# Patient Record
Sex: MALE | Race: WHITE | NOT HISPANIC OR LATINO | URBAN - METROPOLITAN AREA
[De-identification: names, ages, dates, MRNs, and addresses within clinical notes are randomized per-mention and may not be internally consistent; named-entity substitution may affect disease eponyms.]

---

## 2022-04-11 ENCOUNTER — INPATIENT (INPATIENT)
Facility: HOSPITAL | Age: 62
LOS: 16 days | Discharge: HOME HEALTH | DRG: 64 | End: 2022-04-28
Attending: STUDENT IN AN ORGANIZED HEALTH CARE EDUCATION/TRAINING PROGRAM | Admitting: FAMILY MEDICINE
Payer: COMMERCIAL

## 2022-04-11 VITALS
DIASTOLIC BLOOD PRESSURE: 111 MMHG | HEART RATE: 100 BPM | SYSTOLIC BLOOD PRESSURE: 165 MMHG | TEMPERATURE: 99 F | WEIGHT: 149.91 LBS | RESPIRATION RATE: 18 BRPM | HEIGHT: 71 IN | OXYGEN SATURATION: 96 %

## 2022-04-11 DIAGNOSIS — I63.9 CEREBRAL INFARCTION, UNSPECIFIED: ICD-10-CM

## 2022-04-11 LAB
ADD ON TEST-SPECIMEN IN LAB: SIGNIFICANT CHANGE UP
ALBUMIN SERPL ELPH-MCNC: 3.6 G/DL — SIGNIFICANT CHANGE UP (ref 3.3–5)
ALP SERPL-CCNC: 104 U/L — SIGNIFICANT CHANGE UP (ref 40–120)
ALT FLD-CCNC: 25 U/L — SIGNIFICANT CHANGE UP (ref 12–78)
ANION GAP SERPL CALC-SCNC: 6 MMOL/L — SIGNIFICANT CHANGE UP (ref 5–17)
APAP SERPL-MCNC: < 2 UG/ML (ref 10–30)
APPEARANCE UR: ABNORMAL
APPEARANCE UR: CLEAR — SIGNIFICANT CHANGE UP
APTT BLD: 26.5 SEC — LOW (ref 27.5–35.5)
AST SERPL-CCNC: 27 U/L — SIGNIFICANT CHANGE UP (ref 15–37)
BASOPHILS # BLD AUTO: 0.06 K/UL — SIGNIFICANT CHANGE UP (ref 0–0.2)
BASOPHILS NFR BLD AUTO: 0.5 % — SIGNIFICANT CHANGE UP (ref 0–2)
BILIRUB SERPL-MCNC: 1.5 MG/DL — HIGH (ref 0.2–1.2)
BILIRUB UR-MCNC: NEGATIVE — SIGNIFICANT CHANGE UP
BILIRUB UR-MCNC: NEGATIVE — SIGNIFICANT CHANGE UP
BUN SERPL-MCNC: 20 MG/DL — SIGNIFICANT CHANGE UP (ref 7–23)
CALCIUM SERPL-MCNC: 8.7 MG/DL — SIGNIFICANT CHANGE UP (ref 8.5–10.1)
CHLORIDE SERPL-SCNC: 113 MMOL/L — HIGH (ref 96–108)
CO2 SERPL-SCNC: 24 MMOL/L — SIGNIFICANT CHANGE UP (ref 22–31)
COLOR SPEC: YELLOW — SIGNIFICANT CHANGE UP
COLOR SPEC: YELLOW — SIGNIFICANT CHANGE UP
CREAT SERPL-MCNC: 1.21 MG/DL — SIGNIFICANT CHANGE UP (ref 0.5–1.3)
DIFF PNL FLD: ABNORMAL
DIFF PNL FLD: NEGATIVE — SIGNIFICANT CHANGE UP
EGFR: 67 ML/MIN/1.73M2 — SIGNIFICANT CHANGE UP
EOSINOPHIL # BLD AUTO: 0.08 K/UL — SIGNIFICANT CHANGE UP (ref 0–0.5)
EOSINOPHIL NFR BLD AUTO: 0.6 % — SIGNIFICANT CHANGE UP (ref 0–6)
ETHANOL SERPL-MCNC: <10 MG/DL — SIGNIFICANT CHANGE UP (ref 0–10)
GLUCOSE SERPL-MCNC: 84 MG/DL — SIGNIFICANT CHANGE UP (ref 70–99)
GLUCOSE UR QL: NEGATIVE — SIGNIFICANT CHANGE UP
GLUCOSE UR QL: NEGATIVE — SIGNIFICANT CHANGE UP
HCT VFR BLD CALC: 43.2 % — SIGNIFICANT CHANGE UP (ref 39–50)
HGB BLD-MCNC: 14.3 G/DL — SIGNIFICANT CHANGE UP (ref 13–17)
IMM GRANULOCYTES NFR BLD AUTO: 0.9 % — SIGNIFICANT CHANGE UP (ref 0–1.5)
INR BLD: 1.18 RATIO — HIGH (ref 0.88–1.16)
KETONES UR-MCNC: NEGATIVE — SIGNIFICANT CHANGE UP
KETONES UR-MCNC: NEGATIVE — SIGNIFICANT CHANGE UP
LEUKOCYTE ESTERASE UR-ACNC: NEGATIVE — SIGNIFICANT CHANGE UP
LEUKOCYTE ESTERASE UR-ACNC: NEGATIVE — SIGNIFICANT CHANGE UP
LYMPHOCYTES # BLD AUTO: 1.2 K/UL — SIGNIFICANT CHANGE UP (ref 1–3.3)
LYMPHOCYTES # BLD AUTO: 9.3 % — LOW (ref 13–44)
MAGNESIUM SERPL-MCNC: 2.4 MG/DL — SIGNIFICANT CHANGE UP (ref 1.6–2.6)
MCHC RBC-ENTMCNC: 30.3 PG — SIGNIFICANT CHANGE UP (ref 27–34)
MCHC RBC-ENTMCNC: 33.1 GM/DL — SIGNIFICANT CHANGE UP (ref 32–36)
MCV RBC AUTO: 91.5 FL — SIGNIFICANT CHANGE UP (ref 80–100)
MONOCYTES # BLD AUTO: 1.15 K/UL — HIGH (ref 0–0.9)
MONOCYTES NFR BLD AUTO: 9 % — SIGNIFICANT CHANGE UP (ref 2–14)
NEUTROPHILS # BLD AUTO: 10.23 K/UL — HIGH (ref 1.8–7.4)
NEUTROPHILS NFR BLD AUTO: 79.7 % — HIGH (ref 43–77)
NITRITE UR-MCNC: NEGATIVE — SIGNIFICANT CHANGE UP
NITRITE UR-MCNC: NEGATIVE — SIGNIFICANT CHANGE UP
PCP SPEC-MCNC: SIGNIFICANT CHANGE UP
PH UR: 5 — SIGNIFICANT CHANGE UP (ref 5–8)
PH UR: 5 — SIGNIFICANT CHANGE UP (ref 5–8)
PLATELET # BLD AUTO: 150 K/UL — SIGNIFICANT CHANGE UP (ref 150–400)
POTASSIUM SERPL-MCNC: 3.5 MMOL/L — SIGNIFICANT CHANGE UP (ref 3.5–5.3)
POTASSIUM SERPL-SCNC: 3.5 MMOL/L — SIGNIFICANT CHANGE UP (ref 3.5–5.3)
PROT SERPL-MCNC: 6.6 GM/DL — SIGNIFICANT CHANGE UP (ref 6–8.3)
PROT UR-MCNC: NEGATIVE — SIGNIFICANT CHANGE UP
PROT UR-MCNC: NEGATIVE — SIGNIFICANT CHANGE UP
PROTHROM AB SERPL-ACNC: 13.7 SEC — HIGH (ref 10.5–13.4)
RBC # BLD: 4.72 M/UL — SIGNIFICANT CHANGE UP (ref 4.2–5.8)
RBC # FLD: 13.4 % — SIGNIFICANT CHANGE UP (ref 10.3–14.5)
SALICYLATES SERPL-MCNC: <1.7 MG/DL (ref 2.8–20)
SARS-COV-2 RNA SPEC QL NAA+PROBE: SIGNIFICANT CHANGE UP
SODIUM SERPL-SCNC: 143 MMOL/L — SIGNIFICANT CHANGE UP (ref 135–145)
SP GR SPEC: 1 — LOW (ref 1.01–1.02)
SP GR SPEC: 1 — LOW (ref 1.01–1.02)
TROPONIN I, HIGH SENSITIVITY RESULT: 61.67 NG/L — SIGNIFICANT CHANGE UP
TROPONIN I, HIGH SENSITIVITY RESULT: 76.27 NG/L — SIGNIFICANT CHANGE UP
TROPONIN I, HIGH SENSITIVITY RESULT: 96.59 NG/L — HIGH
UROBILINOGEN FLD QL: NEGATIVE — SIGNIFICANT CHANGE UP
UROBILINOGEN FLD QL: NEGATIVE — SIGNIFICANT CHANGE UP
WBC # BLD: 12.84 K/UL — HIGH (ref 3.8–10.5)
WBC # FLD AUTO: 12.84 K/UL — HIGH (ref 3.8–10.5)

## 2022-04-11 PROCEDURE — 81001 URINALYSIS AUTO W/SCOPE: CPT

## 2022-04-11 PROCEDURE — 97110 THERAPEUTIC EXERCISES: CPT | Mod: GP

## 2022-04-11 PROCEDURE — 80162 ASSAY OF DIGOXIN TOTAL: CPT

## 2022-04-11 PROCEDURE — 71045 X-RAY EXAM CHEST 1 VIEW: CPT | Mod: 26

## 2022-04-11 PROCEDURE — 93005 ELECTROCARDIOGRAM TRACING: CPT

## 2022-04-11 PROCEDURE — 80048 BASIC METABOLIC PNL TOTAL CA: CPT

## 2022-04-11 PROCEDURE — 97162 PT EVAL MOD COMPLEX 30 MIN: CPT | Mod: GP

## 2022-04-11 PROCEDURE — C1769: CPT

## 2022-04-11 PROCEDURE — 80061 LIPID PANEL: CPT

## 2022-04-11 PROCEDURE — 86803 HEPATITIS C AB TEST: CPT

## 2022-04-11 PROCEDURE — 87040 BLOOD CULTURE FOR BACTERIA: CPT

## 2022-04-11 PROCEDURE — 80053 COMPREHEN METABOLIC PANEL: CPT

## 2022-04-11 PROCEDURE — 97116 GAIT TRAINING THERAPY: CPT | Mod: GP

## 2022-04-11 PROCEDURE — 93306 TTE W/DOPPLER COMPLETE: CPT

## 2022-04-11 PROCEDURE — 84100 ASSAY OF PHOSPHORUS: CPT

## 2022-04-11 PROCEDURE — 70498 CT ANGIOGRAPHY NECK: CPT | Mod: 26,MA

## 2022-04-11 PROCEDURE — 85610 PROTHROMBIN TIME: CPT

## 2022-04-11 PROCEDURE — 92523 SPEECH SOUND LANG COMPREHEN: CPT | Mod: GN

## 2022-04-11 PROCEDURE — 71250 CT THORAX DX C-: CPT

## 2022-04-11 PROCEDURE — 99223 1ST HOSP IP/OBS HIGH 75: CPT

## 2022-04-11 PROCEDURE — 84439 ASSAY OF FREE THYROXINE: CPT

## 2022-04-11 PROCEDURE — 82962 GLUCOSE BLOOD TEST: CPT

## 2022-04-11 PROCEDURE — U0005: CPT

## 2022-04-11 PROCEDURE — 97530 THERAPEUTIC ACTIVITIES: CPT | Mod: GP

## 2022-04-11 PROCEDURE — U0003: CPT

## 2022-04-11 PROCEDURE — 0042T: CPT

## 2022-04-11 PROCEDURE — 70496 CT ANGIOGRAPHY HEAD: CPT | Mod: 26,MA

## 2022-04-11 PROCEDURE — 85027 COMPLETE CBC AUTOMATED: CPT

## 2022-04-11 PROCEDURE — 83880 ASSAY OF NATRIURETIC PEPTIDE: CPT

## 2022-04-11 PROCEDURE — 84484 ASSAY OF TROPONIN QUANT: CPT

## 2022-04-11 PROCEDURE — 36415 COLL VENOUS BLD VENIPUNCTURE: CPT

## 2022-04-11 PROCEDURE — 92610 EVALUATE SWALLOWING FUNCTION: CPT | Mod: GN

## 2022-04-11 PROCEDURE — 76700 US EXAM ABDOM COMPLETE: CPT

## 2022-04-11 PROCEDURE — C1887: CPT

## 2022-04-11 PROCEDURE — 99285 EMERGENCY DEPT VISIT HI MDM: CPT

## 2022-04-11 PROCEDURE — 87635 SARS-COV-2 COVID-19 AMP PRB: CPT

## 2022-04-11 PROCEDURE — 93010 ELECTROCARDIOGRAM REPORT: CPT

## 2022-04-11 PROCEDURE — 70551 MRI BRAIN STEM W/O DYE: CPT

## 2022-04-11 PROCEDURE — C1894: CPT

## 2022-04-11 PROCEDURE — 84443 ASSAY THYROID STIM HORMONE: CPT

## 2022-04-11 PROCEDURE — 70450 CT HEAD/BRAIN W/O DYE: CPT

## 2022-04-11 PROCEDURE — 83735 ASSAY OF MAGNESIUM: CPT

## 2022-04-11 PROCEDURE — 83036 HEMOGLOBIN GLYCOSYLATED A1C: CPT

## 2022-04-11 PROCEDURE — 81003 URINALYSIS AUTO W/O SCOPE: CPT

## 2022-04-11 PROCEDURE — 92526 ORAL FUNCTION THERAPY: CPT | Mod: GN

## 2022-04-11 PROCEDURE — 93454 CORONARY ARTERY ANGIO S&I: CPT

## 2022-04-11 PROCEDURE — 71045 X-RAY EXAM CHEST 1 VIEW: CPT

## 2022-04-11 RX ORDER — METOPROLOL TARTRATE 50 MG
25 TABLET ORAL
Refills: 0 | Status: DISCONTINUED | OUTPATIENT
Start: 2022-04-11 | End: 2022-04-13

## 2022-04-11 RX ORDER — ASPIRIN/CALCIUM CARB/MAGNESIUM 324 MG
162 TABLET ORAL ONCE
Refills: 0 | Status: COMPLETED | OUTPATIENT
Start: 2022-04-11 | End: 2022-04-11

## 2022-04-11 RX ORDER — DILTIAZEM HCL 120 MG
5 CAPSULE, EXT RELEASE 24 HR ORAL
Qty: 125 | Refills: 0 | Status: DISCONTINUED | OUTPATIENT
Start: 2022-04-11 | End: 2022-04-13

## 2022-04-11 RX ORDER — SODIUM CHLORIDE 9 MG/ML
1000 INJECTION INTRAMUSCULAR; INTRAVENOUS; SUBCUTANEOUS
Refills: 0 | Status: DISCONTINUED | OUTPATIENT
Start: 2022-04-11 | End: 2022-04-11

## 2022-04-11 RX ORDER — METOPROLOL TARTRATE 50 MG
5 TABLET ORAL ONCE
Refills: 0 | Status: COMPLETED | OUTPATIENT
Start: 2022-04-11 | End: 2022-04-11

## 2022-04-11 RX ORDER — SODIUM CHLORIDE 9 MG/ML
1000 INJECTION INTRAMUSCULAR; INTRAVENOUS; SUBCUTANEOUS ONCE
Refills: 0 | Status: COMPLETED | OUTPATIENT
Start: 2022-04-11 | End: 2022-04-11

## 2022-04-11 RX ORDER — ENOXAPARIN SODIUM 100 MG/ML
80 INJECTION SUBCUTANEOUS ONCE
Refills: 0 | Status: DISCONTINUED | OUTPATIENT
Start: 2022-04-11 | End: 2022-04-11

## 2022-04-11 RX ORDER — ENOXAPARIN SODIUM 100 MG/ML
68 INJECTION SUBCUTANEOUS ONCE
Refills: 0 | Status: DISCONTINUED | OUTPATIENT
Start: 2022-04-11 | End: 2022-04-11

## 2022-04-11 RX ORDER — ASPIRIN/CALCIUM CARB/MAGNESIUM 324 MG
81 TABLET ORAL DAILY
Refills: 0 | Status: DISCONTINUED | OUTPATIENT
Start: 2022-04-11 | End: 2022-04-12

## 2022-04-11 RX ORDER — FUROSEMIDE 40 MG
20 TABLET ORAL DAILY
Refills: 0 | Status: DISCONTINUED | OUTPATIENT
Start: 2022-04-11 | End: 2022-04-12

## 2022-04-11 RX ORDER — ATORVASTATIN CALCIUM 80 MG/1
80 TABLET, FILM COATED ORAL AT BEDTIME
Refills: 0 | Status: DISCONTINUED | OUTPATIENT
Start: 2022-04-11 | End: 2022-04-28

## 2022-04-11 RX ORDER — HEPARIN SODIUM 5000 [USP'U]/ML
5000 INJECTION INTRAVENOUS; SUBCUTANEOUS EVERY 12 HOURS
Refills: 0 | Status: DISCONTINUED | OUTPATIENT
Start: 2022-04-11 | End: 2022-04-12

## 2022-04-11 RX ADMIN — Medication 25 MILLIGRAM(S): at 22:52

## 2022-04-11 RX ADMIN — Medication 5 MG/HR: at 14:36

## 2022-04-11 RX ADMIN — Medication 162 MILLIGRAM(S): at 11:53

## 2022-04-11 RX ADMIN — SODIUM CHLORIDE 1000 MILLILITER(S): 9 INJECTION INTRAMUSCULAR; INTRAVENOUS; SUBCUTANEOUS at 07:58

## 2022-04-11 RX ADMIN — Medication 5 MILLIGRAM(S): at 07:58

## 2022-04-11 RX ADMIN — ATORVASTATIN CALCIUM 80 MILLIGRAM(S): 80 TABLET, FILM COATED ORAL at 22:52

## 2022-04-11 RX ADMIN — SODIUM CHLORIDE 50 MILLILITER(S): 9 INJECTION INTRAMUSCULAR; INTRAVENOUS; SUBCUTANEOUS at 14:36

## 2022-04-11 RX ADMIN — HEPARIN SODIUM 5000 UNIT(S): 5000 INJECTION INTRAVENOUS; SUBCUTANEOUS at 23:00

## 2022-04-11 NOTE — ED ADULT NURSE REASSESSMENT NOTE - NS ED NURSE REASSESS COMMENT FT1
received patient at approximately 1130, alert and awake, foregetful, sister at bedside, denies pain, left sided weakness and imbalance, pt attempted to climb out of bed several times without assistance, patient placed closer to nursing station for safety, blood noted in urine, dr. leone made aware and dr. lepe made aware, voiding without difficulty in urine after bladder scan of 200ml noted, patient voided 200 ml, cardizem drip initiated and infusing for afib at 125 Heart rate on monitor, heart rate currently 88.

## 2022-04-11 NOTE — ED PROVIDER NOTE - CLINICAL SUMMARY MEDICAL DECISION MAKING FREE TEXT BOX
Pt presents s/p fall at home when he woke up from the couch c/o right leg weakness now resolved.  Pt AAO x 3 and initial NIHSS zero at triage.  Will get CT head and cardiac workup. Pt presents s/p fall at home when he woke up from the couch c/o right leg weakness now resolved.  Pt AAO x 3 and initial NIHSS zero at triage.  Will get CT head and cardiac workup for suspected TIA.  Pt signed out to Dr. Alegre at 0700 for repeat evaluation and admission.

## 2022-04-11 NOTE — ED ADULT NURSE REASSESSMENT NOTE - NS ED NURSE REASSESS COMMENT FT1
as per dr. sherley bazzi to give aspirin at this time and hold off on lovenox. Will medicate as per order. as per dr. sherley bazzi to give aspirin at this time and hold off on lovenox. Will medicate as per order.  also aware of BP and HR.

## 2022-04-11 NOTE — H&P ADULT - NSHPREVIEWOFSYSTEMS_GEN_ALL_CORE
REVIEW OF SYSTEMS:    CONSTITUTIONAL: + left sided weakness No fevers or chills  EYES/ENT: No visual changes; vertigo or throat pain   NECK: No pain or stiffness  RESPIRATORY: No cough, wheezing, hemoptysis or shortness of breath  CARDIOVASCULAR: No chest pain or palpitations  GASTROINTESTINAL: No abdominal or epigastric pain. No nausea, vomiting, or hematemesis; No diarrhea or constipation. No melena or hematochezia.  GENITOURINARY: No dysuria, urinary frequency or hematuria  NEUROLOGICAL: No numbness or weakness  EXTREMITIES: No swelling or tenderness  SKIN: No itching, burning, rashes, or lesions   All other review of systems is negative unless indicated above.

## 2022-04-11 NOTE — SWALLOW BEDSIDE ASSESSMENT ADULT - SWALLOW EVAL: CRITERIA FOR SKILLED INTERVENTION MET
DO NOT FEEL THAT ACUTE SPEECH PATHOLOGY  FOLLOW UP WOULD CHANGE CLINICAL MANAGEMENT/OUTCOME WHILE PT IN ACUTE HOSPITAL SETTING. PT'S SPEECH-LANGUAGE ABILITIES AND OROPHARYNGEAL SWALLOWING ABILITIES ARE FELT TO BE FUNCTIONAL/AT USUAL STATE. GIVEN ABOVE, THIS SERVICE WILL NOT ACTIVELY FOLLOW. RECONSULT PRN SHOULD STATUS CHANGE AND CONDITION WARRANT.

## 2022-04-11 NOTE — SWALLOW BEDSIDE ASSESSMENT ADULT - SLP GENERAL OBSERVATIONS
On encounter, left sided weakness was evident. Pt was alert. His affect was flat. Pt was interactive. Pt's receptive language abilities were functional and he was able to verbalize during communicative probes. At these times, a frontal lisp was noted which he has had since childhood. His motor speech abilities are functional, his speech output was intelligible and his verbalizations were linguistically intact/contextually appropriate. Pt able to verbalize needs. He feels he is at communicative baseline.

## 2022-04-11 NOTE — CONSULT NOTE ADULT - SUBJECTIVE AND OBJECTIVE BOX
Patient is a 62y old Male who presents with a chief complaint of left sided weakness    HPI: 62 yr old M with no PMHX was brought to ED by EMS on 4/11/22 s/p fall for LE weakness. Patient states at 5:30 am this morning he got up to turn off the TV when his leg gave way and he fell. Pt's sister lives downstairs and heard the fall; she went upstairs and helped him up to a chair, but then pt got up and fell again. Pt's sister was concerned he was having a stroke so she called EMS.     In ED the patient was evaluated, and code stroke was called. CTH showed no acute infarct or hemorrhage. CTA head and neck showed no LVO or stenosis or aneurysms. CT perfusion showed no core infarct. IV TPA was not given because LKN was last night, NIHSS 3. Pt was given 2 baby ASA. He was found to have new onset Afib in ED, and BP was elevated at 156/118.    Upon evaluation, pt is awake and alert, is aware that he fell earlier this morning. He denies any numbness, tingling, headache, visual changes, changes in speech, dizziness or vertigo. Pt's sister states that he has not seen a medical doctor in years, does not take any medications.     PAST MEDICAL & SURGICAL HISTORY:      FAMILY HISTORY:      Social Hx:  Nonsmoker, no drug or alcohol use    MEDICATIONS  (STANDING):  aspirin  chewable 162 milliGRAM(s) Oral once  enoxaparin Injectable 68 milliGRAM(s) SubCutaneous once       Allergies    No Known Allergies    Intolerances        ROS: Pertinent positives in HPI, all other ROS were reviewed and are negative.      Vital Signs Last 24 Hrs  T(C): 37.7 (11 Apr 2022 10:28), Max: 37.7 (11 Apr 2022 10:28)  T(F): 99.8 (11 Apr 2022 10:28), Max: 99.8 (11 Apr 2022 10:28)  HR: 107 (11 Apr 2022 10:00) (100 - 122)  BP: 145/108 (11 Apr 2022 10:00) (139/109 - 165/111)  BP(mean): 124 (11 Apr 2022 09:30) (117 - 126)  RR: 20 (11 Apr 2022 10:00) (16 - 24)  SpO2: 95% (11 Apr 2022 10:00) (91% - 96%)    Physical Exam:    General: Normocephalic, NAD/Obtunded-sedated      Constitutional: awake and alert.  HEENT: PERRLA, EOMI,   Neck: Supple.  Respiratory: Breath sounds are clear bilaterally  Cardiovascular: S1 and S2, regular / irregular rhythm  Gastrointestinal: soft, nontender  Extremities:  no edema  Vascular: Caritid Bruit - no  Musculoskeletal: no joint swelling/tenderness, no abnormal movements  Skin: No rashes    Neurological exam:  HF: A x O x 3. Appropriately interactive, normal affect. Speech fluent, No Aphasia or paraphasic errors. Naming /repetition intact   CN: VIVEK, EOMI, VFF, facial sensation normal, no NLFD, tongue midline, Palate moves equally, SCM equal bilaterally  Motor: No pronator drift, Strength 5/5 in all 4 ext, normal bulk and tone, no tremor, rigidity or bradykinesia.    Sens: Intact to light touch / PP/ VS/ JS    Reflexes: Symmetric and normal . BJ 2+, BR 2+, KJ 2+, AJ 2+, downgoing toes b/l  Coord:  No FNFA, dysmetria, SHEELA intact   Gait/Balance: Normal/Cannot test    NIHSS:          NIHSS:    Labs:   04-11    143  |  113<H>  |  20  ----------------------------<  84  3.5   |  24  |  1.21    Ca    8.7      11 Apr 2022 07:22  Mg     2.4     04-11    TPro  6.6  /  Alb  3.6  /  TBili  1.5<H>  /  DBili  x   /  AST  27  /  ALT  25  /  AlkPhos  104  04-11                              14.3   12.84 )-----------( 150      ( 11 Apr 2022 07:22 )             43.2       Radiology:  - CT Head:  - MRI brain  -MRA brain/Carotids  - EEG  -Echo (just write one or two important things from impression)    A/P: Copy and paste this section to assessment and plan section    For an acute stroke found incidentally, need to do full stroke workup, treat like acute stroke (carotid dopplers are ok instead of MRA head and neck in old age group)  Write most important diagnoses first  Do not need to repeat exam findings  Don't write consider (unless it's cardiac)- write recommend  Don't write why you're doing something (no thought processs) unless it's a differential. Make it simple  Don't say 'appreciate recs' we're only consulting    No IV tpa given because…    #    -ASA/PLAVIX or No ASA plavix for 24 hrs (if TPA was given)  -Continue Atorvastatin  -DVT prophylaxis  -Dysphagia screen  -Echo  -Speech and swallow eval  -PT eval/ rehab eval  -Telemonitoring    - MRI head ordered  - ASA 81 mg QD, starting 24 hrs after  was given, or ASA suppository if patient failed dysphagia screen  - Atorvastatin, lipid profile, Hgb A1c within 24 hours  - Monitor HTN- maintain systolic bp 170-190, no less than 120  - MRI head  - Neuro checks Q4h  - Vital signs Q4h  - Blood glucose checks Q6h for 1st 24hrs  - Echo  - PT eval  - Dysphagia screen today  - DVT prophylaxis  - Speech/swallow eval if patient fails dysphagia screen  - Telemonitoring             Patient is a 62y old Male who presents with a chief complaint of left sided weakness    HPI: 62 yr old M with no PMHX was brought to ED by EMS on 4/11/22 s/p fall for LE weakness. Patient states at 5:30 am this morning he got up to turn off the TV when his leg gave way and he fell. Pt's sister lives downstairs and heard the fall; she went upstairs and helped him up to a chair, but then pt got up and fell again. Pt's sister was concerned he was having a stroke so she called EMS.     In ED the patient was evaluated, and code stroke was called. CTH showed no acute infarct or hemorrhage. CTA head and neck showed no LVO or stenosis or aneurysms. CT perfusion showed no core infarct. IV TPA was not given because LKN was last night, NIHSS 3. Pt was given 2 baby ASA. He was found to have new onset Afib in ED, and upon my evaluation BP was elevated at 156/118.    At bedside pt is awake and alert, is aware that he fell earlier this morning. He denies any numbness, tingling, headache, visual changes, changes in speech, dizziness or vertigo. Pt's sister states that he has not seen a medical doctor in years, does not take any medications.     PAST MEDICAL & SURGICAL HISTORY:  None    FAMILY HISTORY: Noncontributory    Social Hx:  Nonsmoker, no drug or alcohol use    MEDICATIONS  (STANDING):  aspirin  chewable 162 milliGRAM(s) Oral once  enoxaparin Injectable 68 milliGRAM(s) SubCutaneous once     Allergies: No Known Allergies    ROS: Pertinent positives in HPI, all other ROS were reviewed and are negative.      Vital Signs Last 24 Hrs  T(C): 37.7 (11 Apr 2022 10:28), Max: 37.7 (11 Apr 2022 10:28)  T(F): 99.8 (11 Apr 2022 10:28), Max: 99.8 (11 Apr 2022 10:28)  HR: 107 (11 Apr 2022 10:00) (100 - 122)  BP: 145/108 (11 Apr 2022 10:00) (139/109 - 165/111)  BP(mean): 124 (11 Apr 2022 09:30) (117 - 126)  RR: 20 (11 Apr 2022 10:00) (16 - 24)  SpO2: 95% (11 Apr 2022 10:00) (91% - 96%)    Physical Exam:    General: Normocephalic, NAD  HEENT: PERRLA, EOMI,   Neck: Supple.  Respiratory: Breath sounds are clear bilaterally  Cardiovascular: S1 and S2  Extremities:  no edema  Vascular: Carotid Bruit - no  Musculoskeletal: no joint swelling/tenderness, no abnormal movements  Skin: No rashes    Neurological exam:  HF: A x O x 3. Appropriately interactive, normal affect but hesitant. No Aphasia, ?mild dysarthria  CN: VIVEK, EOMI, VFF, facial sensation normal, no NLFD, tongue midline  Motor: +L pronator drift but doesn't hit bed, LLE drift w/o hitting bed, Strength 5/5 RUE and RLE, normal bulk and tone, no tremor, rigidity or bradykinesia.    Sens: Intact to light touch throughout  Reflexes: Symmetric and normal, BJ 2+, BR 2+, KJ 2+, AJ 2+, downgoing toes b/l  Coord:  No FNFA, +LLE dysmetria, SHEELA intact   Gait/Balance: Cannot test    NIHSS: 4      RESULT SUMMARY:  4 points  NIH Stroke Scale    INPUTS:  1A: Level of consciousness —> 0 = Alert; keenly responsive  1B: Ask month and age —> 0 = Both questions right  1C: 'Blink eyes' & 'squeeze hands' —> 0 = Performs both tasks  2: Horizontal extraocular movements —> 0 = Normal  3: Visual fields —> 0 = No visual loss  4: Facial palsy —> 0 = Normal symmetry  5A: Left arm motor drift —> 1 = Drift, but doesn't hit bed  5B: Right arm motor drift —> 0 = No drift for 10 seconds  6A: Left leg motor drift —> 1 = Drift, but doesn't hit bed  6B: Right leg motor drift —> 0 = No drift for 5 seconds  7: Limb Ataxia —> 1 = Ataxia in 1 Limb  8: Sensation —> 0 = Normal; no sensory loss  9: Language/aphasia —> 0 = Normal; no aphasia  10: Dysarthria —> 1 = Mild-moderate dysarthria: slurring but can be understood  11: Extinction/inattention —> 0 = No abnormality    RESULT SUMMARY:  0 points  Modified [place] Scale    INPUTS:  Patient's Baseline Activity —> 0 = No symptoms at all    Labs:   04-11    143  |  113<H>  |  20  ----------------------------<  84  3.5   |  24  |  1.21    Ca    8.7      11 Apr 2022 07:22  Mg     2.4     04-11    TPro  6.6  /  Alb  3.6  /  TBili  1.5<H>  /  DBili  x   /  AST  27  /  ALT  25  /  AlkPhos  104  04-11                          14.3   12.84 )-----------( 150      ( 11 Apr 2022 07:22 )             43.2       Radiology:  CT Angio Neck w/ IV Cont (04.11.22 @ 07:42) >    IMPRESSION:  CT brain perfusion: Apparent increased mean transit time in the bilateral   temporal lobes, more on the right, and right frontal lobe, as well as in   the bilateral cerebellum, may be artifactual. Consider further evaluation   with MRI.    CTA head and neck:    -Moderate atherosclerotic calcifications along the bilateral carotid   bifurcations without significant stenosis. The internal carotid arteries   are patent without significant stenosis. Dense atherosclerotic   calcifications along the bilateral cavernous segments without significant   stenosis.  -Patent cervical and intracranial major arterial vasculature without   evidence of abrupt vessel cut off, hemodynamically significantstenosis,   aneurysm, or dissection.  -Bilateral pleural effusions.    CT Brain Stroke Protocol (04.11.22 @ 07:31) >    IMPRESSION:  No acute intracranial hemorrhage or mass effect.           Patient is a 62y old Male who presents with a chief complaint of left sided weakness    HPI: 62 yr old M with no PMHX was brought to ED by EMS on 4/11/22 s/p fall for LE weakness. Patient states at 5:30 am this morning he got up to turn off the TV when his leg gave way and he fell. Pt's sister lives downstairs and heard the fall; she went upstairs and helped him up to a chair, but then pt got up and fell again. Pt's sister was concerned he was having a stroke so she called EMS.     In ED the patient was evaluated, and code stroke was called. CTH showed no acute infarct or hemorrhage. CTA head and neck showed no LVO or stenosis or aneurysms. CT perfusion showed no core infarct. IV TPA was not given because LKN was last night, NIHSS 3. Pt was given 2 baby ASA. He was found to have new onset Afib in ED, and upon my evaluation BP was elevated at 156/118.    At bedside pt is awake and alert, is aware that he fell earlier this morning. He denies any numbness, tingling, headache, visual changes, changes in speech, dizziness or vertigo. Pt's sister states that he has not seen a medical doctor in years, does not take any medications.     PAST MEDICAL & SURGICAL HISTORY:  None    FAMILY HISTORY: Noncontributory    Social Hx:  Nonsmoker, no drug or alcohol use    MEDICATIONS  (STANDING):  aspirin  chewable 162 milliGRAM(s) Oral once  enoxaparin Injectable 68 milliGRAM(s) SubCutaneous once     Allergies: No Known Allergies    ROS: Pertinent positives in HPI, all other ROS were reviewed and are negative.      Vital Signs Last 24 Hrs  T(C): 37.7 (11 Apr 2022 10:28), Max: 37.7 (11 Apr 2022 10:28)  T(F): 99.8 (11 Apr 2022 10:28), Max: 99.8 (11 Apr 2022 10:28)  HR: 107 (11 Apr 2022 10:00) (100 - 122)  BP: 145/108 (11 Apr 2022 10:00) (139/109 - 165/111)  BP(mean): 124 (11 Apr 2022 09:30) (117 - 126)  RR: 20 (11 Apr 2022 10:00) (16 - 24)  SpO2: 95% (11 Apr 2022 10:00) (91% - 96%)    Physical Exam:    General: Normocephalic, NAD  HEENT: PERRLA, EOMI,   Neck: Supple.  Respiratory: Breath sounds are clear bilaterally  Cardiovascular: S1 and S2  Extremities:  no edema  Vascular: Carotid Bruit - no  Musculoskeletal: no joint swelling/tenderness, no abnormal movements  Skin: No rashes    Neurological exam:  HF: A x O x 3. Appropriately interactive, normal affect but hesitant. No Aphasia, ?mild dysarthria  CN: VIVEK, EOMI, VFF, facial sensation normal, flattened nasolabial fold on left, , tongue midline  Motor: +L pronator drift but doesn't hit bed, LLE drift w/o hitting bed, Strength 5/5 RUE and RLE, normal bulk and tone, no tremor, rigidity or bradykinesia.    Sens: Intact to light touch throughout  Reflexes: Symmetric and normal, BJ 2+, BR 2+, KJ 2+, AJ 2+, downgoing toes b/l  Coord:  No FNFA, +LLE dysmetria, SHEELA intact   Gait/Balance: Cannot test    NIHSS: 5      RESULT SUMMARY:  5 points  NIH Stroke Scale    INPUTS:  1A: Level of consciousness —> 0 = Alert; keenly responsive  1B: Ask month and age —> 0 = Both questions right  1C: 'Blink eyes' & 'squeeze hands' —> 0 = Performs both tasks  2: Horizontal extraocular movements —> 0 = Normal  3: Visual fields —> 0 = No visual loss  4: Facial palsy —> 1 = flattened nasolabial fold on left  5A: Left arm motor drift —> 1 = Drift, but doesn't hit bed  5B: Right arm motor drift —> 0 = No drift for 10 seconds  6A: Left leg motor drift —> 1 = Drift, but doesn't hit bed  6B: Right leg motor drift —> 0 = No drift for 5 seconds  7: Limb Ataxia —> 1 = Ataxia in 1 Limb  8: Sensation —> 0 = Normal; no sensory loss  9: Language/aphasia —> 0 = Normal; no aphasia  10: Dysarthria —> 1 = Mild-moderate dysarthria: slurring but can be understood  11: Extinction/inattention —> 0 = No abnormality    RESULT SUMMARY:  0 points  Modified [place] Scale    INPUTS:  Patient's Baseline Activity —> 0 = No symptoms at all    Labs:   04-11    143  |  113<H>  |  20  ----------------------------<  84  3.5   |  24  |  1.21    Ca    8.7      11 Apr 2022 07:22  Mg     2.4     04-11    TPro  6.6  /  Alb  3.6  /  TBili  1.5<H>  /  DBili  x   /  AST  27  /  ALT  25  /  AlkPhos  104  04-11                          14.3   12.84 )-----------( 150      ( 11 Apr 2022 07:22 )             43.2       Radiology:  CT Angio Neck w/ IV Cont (04.11.22 @ 07:42) >    IMPRESSION:  CT brain perfusion: Apparent increased mean transit time in the bilateral   temporal lobes, more on the right, and right frontal lobe, as well as in   the bilateral cerebellum, may be artifactual. Consider further evaluation   with MRI.    CTA head and neck:    -Moderate atherosclerotic calcifications along the bilateral carotid   bifurcations without significant stenosis. The internal carotid arteries   are patent without significant stenosis. Dense atherosclerotic   calcifications along the bilateral cavernous segments without significant   stenosis.  -Patent cervical and intracranial major arterial vasculature without   evidence of abrupt vessel cut off, hemodynamically significantstenosis,   aneurysm, or dissection.  -Bilateral pleural effusions.    CT Brain Stroke Protocol (04.11.22 @ 07:31) >    IMPRESSION:  No acute intracranial hemorrhage or mass effect.

## 2022-04-11 NOTE — ED PROVIDER NOTE - PROGRESS NOTE DETAILS
Pt reassessed, left pronator drift mild dysarthria, new onset afib, last known well last night, not iv tpa candidate, code stroke initiated.

## 2022-04-11 NOTE — CONSULT NOTE ADULT - SUBJECTIVE AND OBJECTIVE BOX
Cardiology Consultation    HPI: 62 yr old M with no PMHX was brought to ED by EMS on 22 s/p fall for LE weakness. Patient states at 5:30 am this morning he got up to turn off the TV when his leg gave way and he fell. Pt's sister lives downstairs and heard the fall; she went upstairs and helped him up to a chair, but then pt got up and fell again. Pt's sister was concerned he was having a stroke so she called EMS.  In ED the patient was evaluated, and code stroke was called. CTH showed no acute infarct or hemorrhage. CTA head and neck showed no LVO or stenosis or aneurysms. CT perfusion showed no core infarct. IV TPA was not given because LKN was last night, NIHSS 3. Pt was given 2 baby ASA. He was found to have new onset Afib in ED, and upon my evaluation BP was elevated at 156/118.  At bedside pt is awake and alert, is aware that he fell earlier this morning. He denies any Chest pain , numbness, tingling, headache, visual changes, dizziness or vertigo. Patient does have a hx of speech impediment but noticed he had some difficulty speaking this morning; Denies any dysphagia;  Pt's sister states that he has not seen a medical doctor in years, does not take any medications; Patient sister Nancy was at bedsided who he would like to make decisions for him [phone]    PAST MEDICAL & SURGICAL HISTORY: None    FAMILY HISTORY: mother  at 87 from dementia; Father  at abdelrahman age of 87 from AFIB    Social Hx:  Nonsmoker, no drug or alcohol use (2022 12:00)    Allergies  No Known Allergies    MEDICATIONS  (STANDING):  aspirin enteric coated 81 milliGRAM(s) Oral daily  atorvastatin 80 milliGRAM(s) Oral at bedtime  diltiazem Infusion 5 mG/Hr (5 mL/Hr) IV Continuous <Continuous>  heparin   Injectable 5000 Unit(s) SubCutaneous every 12 hours  sodium chloride 0.9%. 1000 milliLiter(s) (50 mL/Hr) IV Continuous <Continuous>    MEDICATIONS  (PRN):    Vital Signs Last 24 Hrs  T(C): 37.7 (2022 10:28), Max: 37.7 (2022 10:28)  T(F): 99.8 (2022 10:28), Max: 99.8 (2022 10:28)  HR: 117 (2022 15:01) (100 - 122)  BP: 140/106 (2022 15:01) (139/109 - 165/111)  BP(mean): 117 (2022 15:01) (117 - 126)  RR: 21 (2022 15:) (16 - 24)  SpO2: 98% (2022 15:) (91% - 98%)    REVIEW OF SYSTEMS:    CONSTITUTIONAL:  As per HPI.  HEENT:  Eyes:  No diplopia or blurred vision. ENT:  No earache, sore throat or runny nose.  CARDIOVASCULAR:  No pressure, squeezing, strangling, tightness, heaviness or aching about the chest, neck, axilla or epigastrium.  RESPIRATORY:  No cough, shortness of breath, PND or orthopnea.  GASTROINTESTINAL:  No nausea, vomiting or diarrhea.  MUSCULOSKELETAL:  As per HPI.  SKIN:  No change in skin, hair or nails.  NEUROLOGIC:  No paresthesias, fasciculations, seizures or weakness.    PHYSICAL EXAMINATION:    GENERAL APPEARANCE:  Pt. is not currently dyspneic, in no distress. Pt. is alert, oriented, and pleasant.  HEENT:  Pupils are normal and react normally. No icterus. Mucous membranes well colored.  NECK:  Supple. No lymphadenopathy. Jugular venous pressure not elevated. Carotids equal.   HEART:   The cardiac impulse has a normal quality. There are no murmurs, rubs or gallops noted  CHEST:  Chest is clear to auscultation. Normal respiratory effort.  ABDOMEN:  Soft and nontender.   EXTREMITIES:  There is no edema.   HF: A x O x 3. Appropriately interactive, normal affect but hesitant. No Aphasia, ?mild dysarthria  CN: VIVEK, EOMI, VFF, facial sensation normal, flattened nasolabial fold on left, , tongue midline  Motor: +L pronator drift but doesn't hit bed, LLE drift w/o hitting bed, Strength 5/5 RUE and RLE,     LABS:                        14.3   12.84 )-----------( 150      ( 2022 07:22 )             43.2     04-11    143  |  113<H>  |  20  ----------------------------<  84  3.5   |  24  |  1.21    Ca    8.7      2022 07:22  Mg     2.4     -11    TPro  6.6  /  Alb  3.6  /  TBili  1.5<H>  /  DBili  x   /  AST  27  /  ALT  25  /  AlkPhos  104  04-11    LIVER FUNCTIONS - ( 2022 07:22 )  Alb: 3.6 g/dL / Pro: 6.6 gm/dL / ALK PHOS: 104 U/L / ALT: 25 U/L / AST: 27 U/L / GGT: x           PT/INR - ( 2022 07:22 )   PT: 13.7 sec;   INR: 1.18 ratio       PTT - ( 2022 07:22 )  PTT:26.5 sec    Urinalysis Basic - ( 2022 14:42 )    Color: Yellow / Appearance: Slightly Turbid / S.005 / pH: x  Gluc: x / Ketone: Negative  / Bili: Negative / Urobili: Negative   Blood: x / Protein: Negative / Nitrite: Negative   Leuk Esterase: Negative / RBC: >50 /HPF / WBC 3-5   Sq Epi: x / Non Sq Epi: Occasional / Bacteria: Occasional    EKG: afib    TELEMETRY: Afib     CARDIAC TESTS: pending    RADIOLOGY & ADDITIONAL STUDIES: < from: CT Angio Neck w/ IV Cont (22 @ 07:42) >  IMPRESSION:  CT brain perfusion: Apparent increased mean transit time in the bilateral   temporal lobes, more on the right, and right frontal lobe, as wellas in   the bilateral cerebellum, may be artifactual. Consider further evaluation   with MRI.    CTA head and neck:  -Patent cervical and intracranial major arterial vasculature without   evidence of abrupt vessel cut off, hemodynamically significantstenosis,   aneurysm, or dissection.  -Bilateral pleural effusions.    < end of copied text >  < from: Xray Chest 1 View AP/PA. (22 @ 07:46) >  IMPRESSION: Mild to moderate CHF.    ASSESSMENT & PLAN:

## 2022-04-11 NOTE — PATIENT PROFILE ADULT - FALL HARM RISK - HARM RISK INTERVENTIONS
Assistance with ambulation/Assistance OOB with selected safe patient handling equipment/Communicate Risk of Fall with Harm to all staff/Discuss with provider need for PT consult/Monitor gait and stability/Reinforce activity limits and safety measures with patient and family/Tailored Fall Risk Interventions/Use of alarms - bed, chair and/or voice tab/Visual Cue: Yellow wristband and red socks/Bed in lowest position, wheels locked, appropriate side rails in place/Call bell, personal items and telephone in reach/Instruct patient to call for assistance before getting out of bed or chair/Non-slip footwear when patient is out of bed/[place] to call system/Physically safe environment - no spills, clutter or unnecessary equipment/Purposeful Proactive Rounding/Room/bathroom lighting operational, light cord in reach

## 2022-04-11 NOTE — ED ADULT NURSE NOTE - OBJECTIVE STATEMENT
Assumed care of pt. Pt bibems s/p fall. Pt states he woke up and got out of bed and felt weak, specifically on L side and fell to the ground. Pt denies headstrike, LOC. Last was able to walk normally Arthur night. Upon arrival to ED pt A+Ox3 following commands appropriately, clear speech no facial droop present. Pt denies any blurred vision/numbness/tingling in extremities, able to move all extremities. LUE arm drift noted. Dr. Alegre at bedside and code stroke initiated. Pt brought straight to CT. Assumed care of pt. Pt bibems s/p fall. Pt states he woke up and got out of bed and felt weak, specifically on L side and fell to the ground. Pt denies headstrike, LOC. Last was able to walk normally Arthur night. Upon arrival to ED pt A+Ox3 following commands appropriately, no facial droop present. Pt denies any blurred vision/numbness/tingling in extremities, able to move all extremities. LUE arm drift noted. Dr. Alegre at bedside and code stroke initiated. Pt brought straight to CT.

## 2022-04-11 NOTE — SWALLOW BEDSIDE ASSESSMENT ADULT - SWALLOW EVAL: DIAGNOSIS
1) On encounter, left sided weakness was evident. Pt was alert. His affect was flat. Pt was interactive. Pt's receptive language abilities were functional and he was able to verbalize during communicative probes. At these times, a frontal lisp was noted which he has had since childhood. His motor speech abilities are functional, his speech output was intelligible and his verbalizations were linguistically intact/contextually appropriate. Pt able to verbalize needs. He feels he is at communicative baseline.   2) Pt fed self with LUE and demonstrated functional Oropharyngeal Swallowing abilities for age. NO behavioral aspiration signs exhibited. Odynophagia denied.

## 2022-04-11 NOTE — ED ADULT TRIAGE NOTE - CHIEF COMPLAINT QUOTE
BIBA from home for weakness. EMS reports paramedic suspected possible CVA. upon initial presentation, EMS reports paramedics noted left arm drift. pt reports he was watching television when he began feeling LE weakness and fell down to his knees. abrasion noted to left knee cap. symptoms have since resolved, NIH 0 in triage. seen and evaluated by MD Tovar, no code stroke. pt is anox3, following commands. .

## 2022-04-11 NOTE — CONSULT NOTE ADULT - NS ATTEND AMEND GEN_ALL_CORE FT
When patient was initially evaluated by ED physician and telestroke he stated that last well known was last night.  He now says that he may have gone to the bathroom without difficulty at 4:30 AM but is not consistent or clear on this.    -Aspirin 81 mg/day for now  -Will likely need AC for new onset afib but will hold off on MRI is performed to determine size of stroke and risk for hemorrhagic conversion.  -Statin  -PT  -Permissive hypertension for first 24 hours.

## 2022-04-11 NOTE — H&P ADULT - HISTORY OF PRESENT ILLNESS
62 yr old M with no PMHX was brought to ED by EMS on 22 s/p fall for LE weakness. Patient states at 5:30 am this morning he got up to turn off the TV when his leg gave way and he fell. Pt's sister lives downstairs and heard the fall; she went upstairs and helped him up to a chair, but then pt got up and fell again. Pt's sister was concerned he was having a stroke so she called EMS.     In ED the patient was evaluated, and code stroke was called. CTH showed no acute infarct or hemorrhage. CTA head and neck showed no LVO or stenosis or aneurysms. CT perfusion showed no core infarct. IV TPA was not given because LKN was last night, NIHSS 3. Pt was given 2 baby ASA. He was found to have new onset Afib in ED, and upon my evaluation BP was elevated at 156/118.    At bedside pt is awake and alert, is aware that he fell earlier this morning. He denies any Chest pain , numbness, tingling, headache, visual changes, dizziness or vertigo. Patient does have a hx of speech impediment but noticed he had some difficulty speaking this morning; Denies any dysphagia;  Pt's sister states that he has not seen a medical doctor in years, does not take any medications; Patient sister Nancy was at bedsided who he would like to make decisions for him [phone]    PAST MEDICAL & SURGICAL HISTORY: None  FAMILY HISTORY: mother  at 87 from dementia; Father  at abdelrahman age of 87 from AFIB  Social Hx:  Nonsmoker, no drug or alcohol use

## 2022-04-11 NOTE — SWALLOW BEDSIDE ASSESSMENT ADULT - SWALLOW EVAL: FEEDING ASSISTANCE
Goals for Diabetes Care:    1. Eat 3 balanced meals each day - Monitor carb intake and aim for 45-60 grams per meal  This would be equal to about 4 choices of carbohydrates. Carbohydrate 1 choice = 15 grams    Do not wait longer than 4-5 hours to eat something  Snacks limit to no more than 15-30 grams of carbohydrates or 1-2 choices  Make sure you include protein source with each meal and at bedtime - this has been shown to help with blood glucose elevations    2. Check blood sugars at least 4-5 times each day at varying times   Blood Glucose Targets:   1. Fasting and before meal target is 80 - 130   2. 2 hours after a meal target is < 180  Always remember to bring meter and log book to all appointments.    3. Activity really helps improve blood sugars. Try to Incorporate 30 minutes activity into each day - does not need to be all at one time & walking counts!    4. Take diabetes medications as prescribed     Follow up with your Diabetic Educator as needed to assess BG targets and need for modifications to medications and/or lifestyle.    Call with any questions.  Thank you!  Emma Guillermo RDN, LD, CDCES   Certified Diabetes Care &   Outpatient [facility]  Triage [phone]                   PT FED SELF WITH NATALIA

## 2022-04-11 NOTE — H&P ADULT - ASSESSMENT
#Acute right CVA, ?embolic d/t new onset Afib.   #uncontrolled HTN  #B/L Pleural Effusions on CXR R/O CHF   #Elevated troponin most likely 2ndry to demand ischemia   - Admit to tele  - Patient is noncompliant and has not seen a PCP for over 40 years; if we were to calculate his current [id number] Score with a possibility of CHF it will be 4  - NIH score of 4  - CT head NAD   - MRI head ordered - IF negative speak with neuro/cardio to start AC   - ASA 81 mg QD and atorvastatin 80 mg QHS   - FU BNP, Lipid profile and Hgb A1c   - Cardiology eval for new onset Afib, to determine AC  - Monitor HTN- maintain systolic bp 160-190, no less than 120  - Neuro checks Q4h  - FU Echo  - PT eval and speech evaluation  - Neuro consult appreciated    #DVT prophylaxis  - Heparin SQ    #Acute right CVA, ?embolic d/t new onset Afib.   #uncontrolled HTN  #B/L Pleural Effusions on CXR R/O CHF   #Elevated troponin most likely 2ndry to demand ischemia   - Admit to tele  - Patient is noncompliant and has not seen a PCP for over 40 years; if we were to calculate his current [id number] Score with a possibility of CHF it will be 4  - NIH score of 4  - CT head NAD   - MRI head ordered - IF negative speak with neuro/cardio to start AC   - ASA 81 mg QD and atorvastatin 80 mg QHS   - FU BNP, Lipid profile and Hgb A1c   - Cardiology eval for new onset Afib, to determine AC   - Monitor HTN- maintain systolic bp 160-190, no less than 120  - Neuro checks Q4h  - FU Echo  - PT eval and speech evaluation  - Neuro consult appreciated  - Discussed with Dr Raza and would like to start IV lasix and Metoprolol; taper cardizem Drip off     #DVT prophylaxis  - Heparin SQ

## 2022-04-11 NOTE — ED PROVIDER NOTE - OBJECTIVE STATEMENT
61 y/o M BIBEMS for fall about 1 hour ago when he stood up from the couch with brief weakness in the right leg and abrasion to the left leg.  Pt notes the weakness is resolved on arrival to the ED.  Pt is AAO x 3 in NAD.  Pt denies CP or SOB.  NIHSS zero at triage.  Pt isn't on any anticoagulation. 61 y/o M BIBEMS for fall about 1 hour ago when he stood up from the couch with brief weakness in the right leg and abrasion to the left leg.  Pt notes the weakness is resolved on arrival to the ED. Pt's last known well time is last night before bed.  Pt is AAO x 3 in NAD.  Pt denies CP or SOB.  NIHSS zero at triage.  Pt isn't on any anticoagulation.

## 2022-04-11 NOTE — H&P ADULT - NSHPLABSRESULTS_GEN_ALL_CORE
Lab Results:  CBC  CBC Full  -  ( 2022 07:22 )  WBC Count : 12.84 K/uL  RBC Count : 4.72 M/uL  Hemoglobin : 14.3 g/dL  Hematocrit : 43.2 %  Platelet Count - Automated : 150 K/uL  Mean Cell Volume : 91.5 fl  Mean Cell Hemoglobin : 30.3 pg  Mean Cell Hemoglobin Concentration : 33.1 gm/dL  Auto Neutrophil # : 10.23 K/uL  Auto Lymphocyte # : 1.20 K/uL  Auto Monocyte # : 1.15 K/uL  Auto Eosinophil # : 0.08 K/uL  Auto Basophil # : 0.06 K/uL  Auto Neutrophil % : 79.7 %  Auto Lymphocyte % : 9.3 %  Auto Monocyte % : 9.0 %  Auto Eosinophil % : 0.6 %  Auto Basophil % : 0.5 %    .		Differential:	[] Automated		[] Manual  Chemistry                        14.3    )-----------( 150      ( 2022 07:22 )             43.2     04-11    143  |  113<H>  |  20  ----------------------------<  84  3.5   |  24  |  1.21    Ca    8.7      2022 07:22  Mg     2.4     04-11    TPro  6.6  /  Alb  3.6  /  TBili  1.5<H>  /  DBili  x   /  AST  27  /  ALT  25  /  AlkPhos  104  04-11    LIVER FUNCTIONS - ( 2022 07:22 )  Alb: 3.6 g/dL / Pro: 6.6 gm/dL / ALK PHOS: 104 U/L / ALT: 25 U/L / AST: 27 U/L / GGT: x           PT/INR - ( 2022 07:22 )   PT: 13.7 sec;   INR: 1.18 ratio         PTT - ( 2022 07:22 )  PTT:26.5 sec  Urinalysis Basic - ( 2022 09:54 )    Color: Yellow / Appearance: Clear / S.005 / pH: x  Gluc: x / Ketone: Negative  / Bili: Negative / Urobili: Negative   Blood: x / Protein: Negative / Nitrite: Negative   Leuk Esterase: Negative / RBC: x / WBC x   Sq Epi: x / Non Sq Epi: x / Bacteria: x    RADIOLOGY RESULTS:    Prelim EKG AFIB @108bpm, nonspecific ST/T wave changes     < from: Xray Chest 1 View AP/PA. (22 @ 07:46) >      IMPRESSION: Mild to moderate CHF.      < end of copied text >  < from: CT Angio Neck w/ IV Cont (22 @ 07:42) >      IMPRESSION:  CT brain perfusion: Apparent increased mean transit time in the bilateral   temporal lobes, more on the right, and right frontal lobe, as wellas in   the bilateral cerebellum, may be artifactual. Consider further evaluation   with MRI.    CTA head and neck:  -Patent cervical and intracranial major arterial vasculature without   evidence of abrupt vessel cut off, hemodynamically significantstenosis,   aneurysm, or dissection.  -Bilateral pleural effusions.      < end of copied text >    < from: CT Brain Stroke Protocol (22 @ 07:31) >      IMPRESSION:  No acute intracranial hemorrhage or mass effect.    < end of copied text >

## 2022-04-12 ENCOUNTER — TRANSCRIPTION ENCOUNTER (OUTPATIENT)
Age: 62
End: 2022-04-12

## 2022-04-12 LAB
A1C WITH ESTIMATED AVERAGE GLUCOSE RESULT: 5.3 % — SIGNIFICANT CHANGE UP (ref 4–5.6)
ADD ON TEST-SPECIMEN IN LAB: SIGNIFICANT CHANGE UP
ANION GAP SERPL CALC-SCNC: 7 MMOL/L — SIGNIFICANT CHANGE UP (ref 5–17)
ANION GAP SERPL CALC-SCNC: 8 MMOL/L — SIGNIFICANT CHANGE UP (ref 5–17)
BUN SERPL-MCNC: 14 MG/DL — SIGNIFICANT CHANGE UP (ref 7–23)
BUN SERPL-MCNC: 14 MG/DL — SIGNIFICANT CHANGE UP (ref 7–23)
CALCIUM SERPL-MCNC: 8.8 MG/DL — SIGNIFICANT CHANGE UP (ref 8.5–10.1)
CALCIUM SERPL-MCNC: 9.2 MG/DL — SIGNIFICANT CHANGE UP (ref 8.5–10.1)
CHLORIDE SERPL-SCNC: 111 MMOL/L — HIGH (ref 96–108)
CHLORIDE SERPL-SCNC: 112 MMOL/L — HIGH (ref 96–108)
CHOLEST SERPL-MCNC: 149 MG/DL — SIGNIFICANT CHANGE UP
CO2 SERPL-SCNC: 21 MMOL/L — LOW (ref 22–31)
CO2 SERPL-SCNC: 22 MMOL/L — SIGNIFICANT CHANGE UP (ref 22–31)
CREAT SERPL-MCNC: 0.94 MG/DL — SIGNIFICANT CHANGE UP (ref 0.5–1.3)
CREAT SERPL-MCNC: 1.04 MG/DL — SIGNIFICANT CHANGE UP (ref 0.5–1.3)
EGFR: 80 ML/MIN/1.73M2 — SIGNIFICANT CHANGE UP
EGFR: 91 ML/MIN/1.73M2 — SIGNIFICANT CHANGE UP
ESTIMATED AVERAGE GLUCOSE: 105 MG/DL — SIGNIFICANT CHANGE UP (ref 68–114)
GLUCOSE SERPL-MCNC: 103 MG/DL — HIGH (ref 70–99)
GLUCOSE SERPL-MCNC: 121 MG/DL — HIGH (ref 70–99)
HCT VFR BLD CALC: 40.5 % — SIGNIFICANT CHANGE UP (ref 39–50)
HCT VFR BLD CALC: 45.3 % — SIGNIFICANT CHANGE UP (ref 39–50)
HCV AB S/CO SERPL IA: 0.07 S/CO — SIGNIFICANT CHANGE UP (ref 0–0.99)
HCV AB SERPL-IMP: SIGNIFICANT CHANGE UP
HDLC SERPL-MCNC: 43 MG/DL — SIGNIFICANT CHANGE UP
HGB BLD-MCNC: 13.7 G/DL — SIGNIFICANT CHANGE UP (ref 13–17)
HGB BLD-MCNC: 15.4 G/DL — SIGNIFICANT CHANGE UP (ref 13–17)
INR BLD: 1.2 RATIO — HIGH (ref 0.88–1.16)
LIPID PNL WITH DIRECT LDL SERPL: 90 MG/DL — SIGNIFICANT CHANGE UP
MCHC RBC-ENTMCNC: 30.4 PG — SIGNIFICANT CHANGE UP (ref 27–34)
MCHC RBC-ENTMCNC: 30.6 PG — SIGNIFICANT CHANGE UP (ref 27–34)
MCHC RBC-ENTMCNC: 33.8 GM/DL — SIGNIFICANT CHANGE UP (ref 32–36)
MCHC RBC-ENTMCNC: 34 GM/DL — SIGNIFICANT CHANGE UP (ref 32–36)
MCV RBC AUTO: 89.9 FL — SIGNIFICANT CHANGE UP (ref 80–100)
MCV RBC AUTO: 90 FL — SIGNIFICANT CHANGE UP (ref 80–100)
NON HDL CHOLESTEROL: 106 MG/DL — SIGNIFICANT CHANGE UP
NT-PROBNP SERPL-SCNC: 7089 PG/ML — HIGH (ref 0–125)
PLATELET # BLD AUTO: 151 K/UL — SIGNIFICANT CHANGE UP (ref 150–400)
PLATELET # BLD AUTO: 172 K/UL — SIGNIFICANT CHANGE UP (ref 150–400)
POTASSIUM SERPL-MCNC: 3.7 MMOL/L — SIGNIFICANT CHANGE UP (ref 3.5–5.3)
POTASSIUM SERPL-MCNC: 3.8 MMOL/L — SIGNIFICANT CHANGE UP (ref 3.5–5.3)
POTASSIUM SERPL-SCNC: 3.7 MMOL/L — SIGNIFICANT CHANGE UP (ref 3.5–5.3)
POTASSIUM SERPL-SCNC: 3.8 MMOL/L — SIGNIFICANT CHANGE UP (ref 3.5–5.3)
PROTHROM AB SERPL-ACNC: 13.9 SEC — HIGH (ref 10.5–13.4)
RBC # BLD: 4.5 M/UL — SIGNIFICANT CHANGE UP (ref 4.2–5.8)
RBC # BLD: 5.04 M/UL — SIGNIFICANT CHANGE UP (ref 4.2–5.8)
RBC # FLD: 13.7 % — SIGNIFICANT CHANGE UP (ref 10.3–14.5)
RBC # FLD: 13.7 % — SIGNIFICANT CHANGE UP (ref 10.3–14.5)
SODIUM SERPL-SCNC: 140 MMOL/L — SIGNIFICANT CHANGE UP (ref 135–145)
SODIUM SERPL-SCNC: 141 MMOL/L — SIGNIFICANT CHANGE UP (ref 135–145)
TRIGL SERPL-MCNC: 81 MG/DL — SIGNIFICANT CHANGE UP
WBC # BLD: 11.34 K/UL — HIGH (ref 3.8–10.5)
WBC # BLD: 9.96 K/UL — SIGNIFICANT CHANGE UP (ref 3.8–10.5)
WBC # FLD AUTO: 11.34 K/UL — HIGH (ref 3.8–10.5)
WBC # FLD AUTO: 9.96 K/UL — SIGNIFICANT CHANGE UP (ref 3.8–10.5)

## 2022-04-12 PROCEDURE — 99233 SBSQ HOSP IP/OBS HIGH 50: CPT

## 2022-04-12 PROCEDURE — 99221 1ST HOSP IP/OBS SF/LOW 40: CPT

## 2022-04-12 PROCEDURE — 93306 TTE W/DOPPLER COMPLETE: CPT | Mod: 26

## 2022-04-12 PROCEDURE — 70551 MRI BRAIN STEM W/O DYE: CPT | Mod: 26

## 2022-04-12 PROCEDURE — 99222 1ST HOSP IP/OBS MODERATE 55: CPT

## 2022-04-12 PROCEDURE — 70450 CT HEAD/BRAIN W/O DYE: CPT | Mod: 26

## 2022-04-12 PROCEDURE — 93010 ELECTROCARDIOGRAM REPORT: CPT

## 2022-04-12 RX ORDER — CHLORHEXIDINE GLUCONATE 213 G/1000ML
1 SOLUTION TOPICAL
Refills: 0 | Status: DISCONTINUED | OUTPATIENT
Start: 2022-04-12 | End: 2022-04-28

## 2022-04-12 RX ORDER — HYDRALAZINE HCL 50 MG
5 TABLET ORAL EVERY 6 HOURS
Refills: 0 | Status: DISCONTINUED | OUTPATIENT
Start: 2022-04-12 | End: 2022-04-13

## 2022-04-12 RX ADMIN — Medication 25 MILLIGRAM(S): at 21:02

## 2022-04-12 RX ADMIN — Medication 25 MILLIGRAM(S): at 10:26

## 2022-04-12 RX ADMIN — ATORVASTATIN CALCIUM 80 MILLIGRAM(S): 80 TABLET, FILM COATED ORAL at 21:02

## 2022-04-12 RX ADMIN — Medication 5 MG/HR: at 21:02

## 2022-04-12 RX ADMIN — Medication 20 MILLIGRAM(S): at 10:26

## 2022-04-12 NOTE — PROGRESS NOTE ADULT - ASSESSMENT
A/P: 62 yr old M with no PMHX was brought to ED by EMS on 4/11/22 s/p fall for LE weakness.     1. Atrial fibrillation. Unknown h/o afib. In setting of CVA. Received lovenox dose in ER.   In setting of acute CVA, will hold off on anticoagulation until after MRI- d/w neuro. If MRI negtaive for bleed the would strat eliquis 5 mg BID   Will start Bbl for HR/BP control.   Possible ALFREDO/CV when more stable.Would wait at least one month post CVA  Will start with a rate control strategy for now.  Will need to assess LV fxn with 2Decho.     2. CHF. Poor access to medical care. CXR with mild to moderate CHF.diastolic dysfunction ? cont lasix 2Decho pending.  cont metoprolol for HR control  3. +Troponins. Likely demand in setting of CVA, afib, CHF.   Medical mgmt as above.     4. DVT proph.

## 2022-04-12 NOTE — CONSULT NOTE ADULT - SUBJECTIVE AND OBJECTIVE BOX
Patient is a 62y old  Male who presents with a chief complaint of Left sided weakness (2022 08:21)    HPI:  62 yr old M with no PMHX was brought to ED by EMS on 22 s/p fall for LE weakness. CODE Stroke called in ED and patient admitted for CVA work-up. Patient did not recieve tPA due to unknown down time. Started on ASA and admitted to medicine for work-up. Hospital course complicated by new onset Afib    Consulted by medicine team as MRI demonstrated acute CVA w/ central hemorrhage.     Allergies: No Known Allergies    PAST MEDICAL & SURGICAL HISTORY:    FAMILY HISTORY:    SOCIAL HISTORY:    Home Medications:    Review of Systems:  Constitutional: no fever, chills, fatigue  Neuro: no headache, numbness, weakness  Resp: no cough, wheezing, shortness of breath  CVS: no chest pain, palpitations, leg swelling  GI: no abdominal pain, nausea, vomiting, diarrhea   : no dysuria, frequency, incontinence  Skin: no itching, burning, rashes, or lesions   Msk: no joint pain or swelling  Psych: no depression, anxiety, mood swings    T(F): 98 (22 @ 07:15), Max: 98.7 (22 @ 19:50)  HR: 81 (22 @ 07:15) (81 - 117)  BP: 143/97 (22 @ 07:15) (118/89 - 147/120)  RR: 17 (22 @ 07:15) (15 - 21)  SpO2: 97% (22 @ 07:15)  Wt(kg): --    CAPILLARY BLOOD GLUCOSE      POCT Blood Glucose.: 106 mg/dL (2022 06:37)    I&O's Summary      Physical Exam:     Gen:  Neuro:  HEENT:  CVS:  Resp:  Abd:  Ext:  Skin:    Meds:    diltiazem Infusion 5 mG/Hr IV Continuous <Continuous>  furosemide   Injectable 20 milliGRAM(s) IV Push daily  hydrALAZINE Injectable 5 milliGRAM(s) IV Push every 6 hours PRN  metoprolol tartrate 25 milliGRAM(s) Oral two times a day     atorvastatin 80 milliGRAM(s) Oral at bedtime                             chlorhexidine 4% Liquid 1 Application(s) Topical <User Schedule>                              13.7   9.96  )-----------( 151      ( 2022 06:59 )             40.5       04-12    141  |  112<H>  |  14  ----------------------------<  121<H>  3.7   |  21<L>  |  0.94    Ca    8.8      2022 06:59  Mg     2.4     -11    TPro  6.6  /  Alb  3.6  /  TBili  1.5<H>  /  DBili  x   /  AST  27  /  ALT  25  /  AlkPhos  104  04-11          PT/INR - ( 2022 07:22 )   PT: 13.7 sec;   INR: 1.18 ratio         PTT - ( 2022 07:22 )  PTT:26.5 sec  Urinalysis Basic - ( 2022 14:42 )    Color: Yellow / Appearance: Slightly Turbid / S.005 / pH: x  Gluc: x / Ketone: Negative  / Bili: Negative / Urobili: Negative   Blood: x / Protein: Negative / Nitrite: Negative   Leuk Esterase: Negative / RBC: >50 /HPF / WBC 3-5   Sq Epi: x / Non Sq Epi: Occasional / Bacteria: Occasional              Radiology:   < from: MR Head No Cont (22 @ 09:44) >    ACC: [id number] EXAM:  MR BRAIN                          PROCEDURE DATE:  2022          INTERPRETATION:  MR brain  without gadolinium    CLINICAL INFORMATION: Stroke.    TECHNIQUE:   Sagittal and axial T1-weighted images, axial FLAIR images,   axial gradient echo and T2-weighted images and axial diffusion weighted   images of the brain were obtained.    FINDINGS:   CT head dated 2022 available for review.    The brain demonstrates an acute infarction within the anterior RIGHT   basal ganglia and straightening restricted diffusion and central   hemorrhage measuring 2.4 cm, with mass effect on the anterior horn of the   RIGHT lateral ventricle. Smaller acute infarctions involve the anterior   inferior RIGHT frontal lobe, the posterior RIGHT basal ganglia and   posterior RIGHT insular cortex as well as the posterior RIGHT temporal on   lateral RIGHT occipital cortex, with restricted diffusion. There is no   midline shift.    The vertebral and internal carotid arteries demonstrateexpected flow   voids indicating their patency.    The orbits are unremarkable.  The paranasal sinuses are clear.  The nasal   cavity appears intact.  The nasopharynx is symmetric.  The central skull   base and temporal bones are intact.  The calvarium appears unremarkable.    IMPRESSION:   Acute infarction within the anterior RIGHT basal ganglia   and straightening restricted diffusion and central hemorrhage measuring   2.4 cm, with mass effect on the anterior horn of the RIGHT lateral   ventricle. Smaller acute infarctions involve the anterior inferior RIGHT   frontal lobe, the posterior RIGHT basal ganglia and posterior RIGHT   insular cortex as well as the posterior RIGHT temporal on lateral RIGHT   occipital cortex,  with restricted diffusion.    FINDINGS discussed with Dr. Dee at 10:15 AM on 2022    --- End of Report ---            OMEGA WOODS MD; Attending Radiologist  This document has been electronically signed. 2022 10:28AM    < end of copied text >      Problems  -Acute CVA w/ hemorrhage  -Afib    Assessment/Plan:  -MRI demonstrates acute infarct w/ central hemorrhage  -Q1 neuro checks  -Repeat CTH in 4 hours. Neurosurgery consulted  -Keep SBP <140. Daily statin  -ASA and Heparin SC discontinued  -New onset Afib; echo demonstrated EF ~15%. Cardiology following. Rate control w/ Metoprolol BID. Not candidate for AC at this time.   -SCD for DVT prophylaxis    Admit to ICU for neuro monitoring. Discussed w/ Intensivist Dr. Abernathy   Patient is a 62y old  Male who presents with a chief complaint of Left sided weakness (2022 08:21)    HPI:  62 yr old M with no PMHX was brought to ED by EMS on 22 s/p fall for LE weakness. CODE Stroke called in ED and patient admitted for CVA work-up. Patient did not recieve tPA due to unknown down time. Started on ASA and admitted to medicine for work-up. Hospital course complicated by new onset Afib    Consulted by medicine team as MRI demonstrated acute CVA w/ central hemorrhage. At bedside patient resting comfortably denies any headache, dizziness, weakness, blurred vision, or lethargy.    Allergies: No Known Allergies    PAST MEDICAL & SURGICAL HISTORY:    FAMILY HISTORY:    SOCIAL HISTORY:    Home Medications:    Review of Systems:  ROS as noted above, all others negative x12    T(F): 98 (22 @ 07:15), Max: 98.7 (22 @ 19:50)  HR: 81 (22 @ 07:15) (81 - 117)  BP: 143/97 (22 @ 07:15) (118/89 - 147/120)  RR: 17 (22 @ 07:15) (15 - 21)  SpO2: 97% (22 @ 07:15)  Wt(kg): --    CAPILLARY BLOOD GLUCOSE      POCT Blood Glucose.: 106 mg/dL (2022 06:37)    I&O's Summary      Physical Exam:     Gen:  Neuro:  HEENT:  CVS:  Resp:  Abd:  Ext:  Skin:    Meds:    diltiazem Infusion 5 mG/Hr IV Continuous <Continuous>  furosemide   Injectable 20 milliGRAM(s) IV Push daily  hydrALAZINE Injectable 5 milliGRAM(s) IV Push every 6 hours PRN  metoprolol tartrate 25 milliGRAM(s) Oral two times a day     atorvastatin 80 milliGRAM(s) Oral at bedtime                             chlorhexidine 4% Liquid 1 Application(s) Topical <User Schedule>                              13.7   9.96  )-----------( 151      ( 2022 06:59 )             40.5       04-12    141  |  112<H>  |  14  ----------------------------<  121<H>  3.7   |  21<L>  |  0.94    Ca    8.8      2022 06:59  Mg     2.4         TPro  6.6  /  Alb  3.6  /  TBili  1.5<H>  /  DBili  x   /  AST  27  /  ALT  25  /  AlkPhos  104  04-          PT/INR - ( 2022 07:22 )   PT: 13.7 sec;   INR: 1.18 ratio         PTT - ( 2022 07:22 )  PTT:26.5 sec  Urinalysis Basic - ( 2022 14:42 )    Color: Yellow / Appearance: Slightly Turbid / S.005 / pH: x  Gluc: x / Ketone: Negative  / Bili: Negative / Urobili: Negative   Blood: x / Protein: Negative / Nitrite: Negative   Leuk Esterase: Negative / RBC: >50 /HPF / WBC 3-5   Sq Epi: x / Non Sq Epi: Occasional / Bacteria: Occasional              Radiology:   < from: MR Head No Cont (22 @ 09:44) >    ACC: [id number] EXAM:  MR BRAIN                          PROCEDURE DATE:  2022          INTERPRETATION:  MR brain  without gadolinium    CLINICAL INFORMATION: Stroke.    TECHNIQUE:   Sagittal and axial T1-weighted images, axial FLAIR images,   axial gradient echo and T2-weighted images and axial diffusion weighted   images of the brain were obtained.    FINDINGS:   CT head dated 2022 available for review.    The brain demonstrates an acute infarction within the anterior RIGHT   basal ganglia and straightening restricted diffusion and central   hemorrhage measuring 2.4 cm, with mass effect on the anterior horn of the   RIGHT lateral ventricle. Smaller acute infarctions involve the anterior   inferior RIGHT frontal lobe, the posterior RIGHT basal ganglia and   posterior RIGHT insular cortex as well as the posterior RIGHT temporal on   lateral RIGHT occipital cortex, with restricted diffusion. There is no   midline shift.    The vertebral and internal carotid arteries demonstrateexpected flow   voids indicating their patency.    The orbits are unremarkable.  The paranasal sinuses are clear.  The nasal   cavity appears intact.  The nasopharynx is symmetric.  The central skull   base and temporal bones are intact.  The calvarium appears unremarkable.    IMPRESSION:   Acute infarction within the anterior RIGHT basal ganglia   and straightening restricted diffusion and central hemorrhage measuring   2.4 cm, with mass effect on the anterior horn of the RIGHT lateral   ventricle. Smaller acute infarctions involve the anterior inferior RIGHT   frontal lobe, the posterior RIGHT basal ganglia and posterior RIGHT   insular cortex as well as the posterior RIGHT temporal on lateral RIGHT   occipital cortex,  with restricted diffusion.    FINDINGS discussed with Dr. Dee at 10:15 AM on 2022    --- End of Report ---            OMEGA WOODS MD; Attending Radiologist  This document has been electronically signed. 2022 10:28AM    < end of copied text >      Problems  -Acute CVA w/ hemorrhage  -Afib w/ RVR    Assessment/Plan:  -MRI demonstrates acute infarct w/ central hemorrhage  -Q1 neuro checks  -Repeat CTH in 4 hours. Neurosurgery consulted  -Keep SBP <140. Daily statin  -ASA and Heparin SC discontinued  -New onset Afib w/ RVR; rate control w/ Cardizem gtt. Additional Metoprolol BID to help aide weaning of gtt. Echo demonstrated EF ~15%. Cardiology following. Not candidate for AC at this time.   -SCD for DVT prophylaxis    Admit to ICU for neuro monitoring. Discussed w/ Intensivist Dr. Abernathy

## 2022-04-12 NOTE — CONSULT NOTE ADULT - SUBJECTIVE AND OBJECTIVE BOX
HPI:  Patient is a 62 yr old male with no PMHX was brought to ED by EMS on 22 s/p fall for LE weakness. Patient states at 5:30 am this morning he got up to turn off the TV when his leg gave way and he fell. Pt's sister lives downstairs and heard the fall; she went upstairs and helped him up to a chair, but then pt got up and fell again. Pt's sister was concerned he was having a stroke so she called EMS.     In ED the patient was evaluated, and code stroke was called. CTH showed no acute infarct or hemorrhage. CTA head and neck showed no LVO or stenosis or aneurysms. CT perfusion showed no core infarct. IV TPA was not given because LKN was last night, NIHSS 3. Pt was given 2 baby ASA. He was found to have new onset Afib in ED, and upon my evaluation BP was elevated at 156/118.    At bedside pt is awake and alert, is aware that he fell earlier this morning. He denies any Chest pain , numbness, tingling, headache, visual changes, dizziness or vertigo. Patient does have a hx of speech impediment but noticed he had some difficulty speaking this morning; Denies any dysphagia;  Pt's sister states that he has not seen a medical doctor in years, does not take any medications; Patient sister Nancy was at bedsided who he would like to make decisions for him [phone]    Neurosurgery called this morning for noted hemorrhage on MRI brain, acute infarct right basal ganglia. Patient seen and examined on [address] [place]. Patient drowsy, awakens for exam. He denies headache, n/v, numb.tingling, new weakness.    PAST MEDICAL & SURGICAL HISTORY: None  FAMILY HISTORY: mother  at 87 from dementia; Father  at abdelrahman age of 87 from AFIB    Social Hx:  Nonsmoker, no drug or alcohol use         MEDICATIONS  (STANDING):  atorvastatin 80 milliGRAM(s) Oral at bedtime  chlorhexidine 4% Liquid 1 Application(s) Topical <User Schedule>  diltiazem Infusion 5 mG/Hr (5 mL/Hr) IV Continuous <Continuous>  furosemide   Injectable 20 milliGRAM(s) IV Push daily  metoprolol tartrate 25 milliGRAM(s) Oral two times a day       Allergies  No Known Allergies  Intolerances        ROS: Pertinent positives in HPI, all other ROS were reviewed and are negative.      Vital Signs Last 24 Hrs  T(C): 36.7 (2022 07:15), Max: 37.1 (2022 19:50)  T(F): 98 (2022 07:15), Max: 98.7 (2022 19:50)  HR: 81 (2022 07:15) (81 - 117)  BP: 143/97 (2022 07:15) (118/89 - 147/120)  BP(mean): 11 (2022 19:50) (11 - 130)  RR: 17 (2022 07:15) (15 - 21)  SpO2: 97% (2022 07:15) (94% - 100%)        Constitutional: awake and alert.  HEENT: PERRLA, EOMI,   Neck: Supple.  Respiratory: Breath sounds are clear bilaterally  Cardiovascular: S1 and S2, regular / irregular rhythm  Gastrointestinal: soft, nontender  Extremities:  no edema  Vascular: Caritid Bruit - no  Musculoskeletal: no joint swelling/tenderness, no abnormal movements  Skin: No rashes    Neurological exam:  HF: A x O x 3. Mild dysarthria. No Aphasia or paraphasic errors. Naming /repetition intact   CN: VIVEK, EOMI, VFF, facial sensation normal, left NLFD, tongue midline, Palate moves equally, SCM equal bilaterally  Motor: Left pronator drift, Strength 5/5 except LUE 4/5, LLE 4-/5, normal bulk and tone, no tremor, rigidity or bradykinesia.    Sens: Intact to light touch  Reflexes: Symmetric and normal, downgoing toes b/l  Coord:  Left FTN dysmetria, SHEELA intact   Gait/Balance: Cannot test    NIHSS: 5          Labs:                        15.4   11.34 )-----------( 172      ( 2022 11:26 )             45.3     04-12    141  |  112<H>  |  14  ----------------------------<  121<H>  3.7   |  21<L>  |  0.94    Ca    8.8      2022 06:59  Mg     2.4     04-11    TPro  6.6  /  Alb  3.6  /  TBili  1.5<H>  /  DBili  x   /  AST  27  /  ALT  25  /  AlkPhos  104  04-11        PT/INR - ( 2022 07:22 )   PT: 13.7 sec;   INR: 1.18 ratio         PTT - ( 2022 07:22 )  PTT:26.5 sec    Radiology report:  MR Head No Cont (22 @ 09:44)  IMPRESSION:   Acute infarction within the anterior RIGHT basal ganglia   and straightening restricted diffusion and central hemorrhage measuring   2.4 cm, with mass effect on the anterior horn of the RIGHT lateral   ventricle. Smaller acute infarctions involve the anterior inferior RIGHT   frontal lobe, the posterior RIGHT basal ganglia and posterior RIGHT   insular cortex as well as the posterior RIGHT temporal on lateral RIGHT   occipital cortex,  with restricted diffusion.

## 2022-04-12 NOTE — CHART NOTE - NSCHARTNOTEFT_GEN_A_CORE
*Transfer to ICU team* Refer to hospitalist progress note.   Upgrade to ICU for closer monitoring. Patient found to have Acute Multiple Infarcts with hemorrhagic conversion.

## 2022-04-12 NOTE — DISCHARGE NOTE NURSING/CASE MANAGEMENT/SOCIAL WORK - PATIENT PORTAL LINK FT
You can access the FollowMyHealth Patient Portal offered by [facility] by registering at the following website: [email or web address]. By joining [facility]’s FollowMyHealth portal, you will also be able to view your health information using other applications (apps) compatible with our system.

## 2022-04-12 NOTE — PROGRESS NOTE ADULT - ASSESSMENT
62 yr old M with no PMHX was brought to ED by EMS on 4/11/22 s/p fall for LE weakness. In ED the patient was evaluated, and code stroke was called. CTH showed no acute infarct or hemorrhage. IV TPA was not given because LKN was last night, NIHSS 3. He was found to have new onset Afib in ED.    #Acute right CVA, ?embolic d/t new onset Afib.   #uncontrolled HTN  #B/L Pleural Effusions on CXR R/O CHF   #Elevated troponin most likely 2ndry to demand ischemia   - Admit to tele  - Patient is noncompliant and has not seen a PCP for over 40 years; if we were to calculate his current [id number] Score with a possibility of CHF it will be 4  - NIH score of 4  - CT head NAD   - MRI head ordered - IF negative speak with neuro/cardio to start AC   - ASA 81 mg QD and atorvastatin 80 mg QHS   - FU BNP, Lipid profile and Hgb A1c   - Cardiology eval for new onset Afib, to determine AC   - Monitor HTN- maintain systolic bp 160-190, no less than 120  - Neuro checks Q4h  - FU Echo  - PT eval and speech evaluation  - Neuro consult appreciated  - Discussed with Dr Raza and would like to start IV lasix and Metoprolol; taper cardizem Drip off     #DVT prophylaxis  - Heparin SQ    62 yr old M with no PMHX was brought to ED by EMS on 4/11/22 s/p fall for LE weakness. In ED the patient was evaluated, and code stroke was called. CTH showed no acute infarct or hemorrhage. IV TPA was not given because LKN was last night, NIHSS 3. He was found to have new onset Afib in ED.    Acute Multiple Infarcts with hemorrhagic conversion  ?Embolic d/t new onset Afib. Patient also with uncontrolled HTN  - Patient will be upgraded to ICU for closer observation, q1 hour neuro checks  - Neurosurgery called --- CT head for 1:30PM  - Hold aspirin and AC  - Will stop permissive hypertension at this time.  Keep SBP < 140  - Continue statin    New Onset Afib with RVR  - Monitor on tele. Afib HR up to 120-130s  - Continue Metoprolol PO BID  - Continue Cardizem Drip w/ Goal HR <100  - CHADsVasc = 4 (HFrEF, CVA, HTN)  - TFTs WNL  - Possible ALFREDO/CV when more stable. Would wait at least one month post CVA  Will start with a rate control strategy for now.    Acute Systolic Heart Failure   - EF noted to be 15% on Echocardiogram   - Continue Metoprolol   - Daily weights, low sodium diet, FR 1500mL  - ?Ischemic evaluation     Elevated troponin   - ASA held (see plan above)   - Continue Atorvastatin  - Denies active CP. ECG w/ nonspecific T wave changes    DVT prophylaxis  - SCDs     Transfer to ICU case d/w team.   Nancy (sister) patient states as HCP

## 2022-04-12 NOTE — DISCHARGE NOTE NURSING/CASE MANAGEMENT/SOCIAL WORK - NSDCPEFALRISK_GEN_ALL_CORE
For information on Fall & Injury Prevention, visit: [email or web address] OR  [email or web address] OR  https://www.cdc.gov/steadi/patient.html

## 2022-04-12 NOTE — PROGRESS NOTE ADULT - SUBJECTIVE AND OBJECTIVE BOX
Interval History:  22: Patient has no new complaints today including headache.  Went for MRI and found to have hemorrhagic conversion of stroke.    MEDICATIONS  (STANDING):  atorvastatin 80 milliGRAM(s) Oral at bedtime  chlorhexidine 4% Liquid 1 Application(s) Topical <User Schedule>  diltiazem Infusion 5 mG/Hr (5 mL/Hr) IV Continuous <Continuous>  furosemide   Injectable 20 milliGRAM(s) IV Push daily  metoprolol tartrate 25 milliGRAM(s) Oral two times a day    MEDICATIONS  (PRN):  hydrALAZINE Injectable 5 milliGRAM(s) IV Push every 6 hours PRN SBP >150      Allergies    No Known Allergies    Intolerances        PHYSICAL EXAM:  Vital Signs Last 24 Hrs  T(F): 98 (22 @ 07:15)  HR: 81 (22 @ 07:15)  BP: 143/97 (22 @ 07:15)  RR: 17 (22 @ 07:15)    GENERAL: NAD, well-groomed  HEAD:  Atraumatic, Normocephalic  Neuro:  Awake, alert, no aphasia  CN: PERRL, EOMI, no nystagmus, slight flattening of left nasolabial fold, mild dysarthria,  tongue protrudes in the midline  motor: + left drift pronator drift, 4/5 in LUE, 4-/5 in LLE, Left leg drift, 5/5 in RUE and RLE  sensory: intact to light touch  coordination: + dysmetria on left arm  DTRs: symmetric, plantar responses flexor bilaterally    NIH Stoke Scale Score: 5      LABS:                        13.7   9.96  )-----------( 151      ( 2022 06:59 )             40.5     04-12    141  |  112<H>  |  14  ----------------------------<  121<H>  3.7   |  21<L>  |  0.94    Ca    8.8      2022 06:59  Mg     2.4     04-11    TPro  6.6  /  Alb  3.6  /  TBili  1.5<H>  /  DBili  x   /  AST  27  /  ALT  25  /  AlkPhos  104  04-11    PT/INR - ( 2022 07:22 )   PT: 13.7 sec;   INR: 1.18 ratio         PTT - ( 2022 07:22 )  PTT:26.5 sec  Urinalysis Basic - ( 2022 14:42 )    Color: Yellow / Appearance: Slightly Turbid / S.005 / pH: x  Gluc: x / Ketone: Negative  / Bili: Negative / Urobili: Negative   Blood: x / Protein: Negative / Nitrite: Negative   Leuk Esterase: Negative / RBC: >50 /HPF / WBC 3-5   Sq Epi: x / Non Sq Epi: Occasional / Bacteria: Occasional        RADIOLOGY & ADDITIONAL STUDIES:  CT Angio Neck w/ IV Cont (22 @ 07:42) >    IMPRESSION:  CT brain perfusion: Apparent increased mean transit time in the bilateral   temporal lobes, more on the right, and right frontal lobe, as well as in   the bilateral cerebellum, may be artifactual. Consider further evaluation   with MRI.    CTA head and neck:    -Moderate atherosclerotic calcifications along the bilateral carotid   bifurcations without significant stenosis. The internal carotid arteries   are patent without significant stenosis. Dense atherosclerotic   calcifications along the bilateral cavernous segments without significant   stenosis.  -Patent cervical and intracranial major arterial vasculature without   evidence of abrupt vessel cut off, hemodynamically significantstenosis,   aneurysm, or dissection.  -Bilateral pleural effusions.    CT Brain Stroke Protocol (22 @ 07:31) >    IMPRESSION:  No acute intracranial hemorrhage or mass effect.    MRI head 22:  Acute infarction within the anterior RIGHT basal ganglia   and straightening restricted diffusion and central hemorrhage measuring   2.4 cm, with mass effect on the anterior horn of the RIGHT lateral   ventricle. Smaller acute infarctions involve the anterior inferior RIGHT   frontal lobe, the posterior RIGHT basal ganglia and posterior RIGHT   insular cortex as well as the posterior RIGHT temporal on lateral RIGHT   occipital cortex,  with restricted diffusion.    Echo 22:     Moderate (2+) mitral regurgitation is present.   Normal aortic valve structure and function.   Moderate (2+) tricuspid valve regurgitation is present.   The left ventricle cavity is mildly dilated.   Estimated left ventricular ejection fraction is 15 %.   Severe, diffuse hypokinesis of the left ventricle is present.   The right ventricle exhibits mild diffuse hypokinesis, and mild   depression   of contractility.   Pleural effusion - is present.

## 2022-04-12 NOTE — PHYSICAL THERAPY INITIAL EVALUATION ADULT - PRECAUTIONS/LIMITATIONS, REHAB EVAL
+Troponins-Likely demand in setting of CVA, afib, CHF per cardiol/aspiration precautions/cardiac precautions/fall precautions +Troponins (downtrending)-Likely demand in setting of CVA, afib, CHF per cardiol/aspiration precautions/cardiac precautions/fall precautions/seizure precautions

## 2022-04-12 NOTE — PROGRESS NOTE ADULT - SUBJECTIVE AND OBJECTIVE BOX
Patient is a 62y old  Male who presents with a chief complaint of Left sided weakness (2022 07:53)    - lethargic , denies headache , LE weakness    MEDICATIONS  (STANDING):  aspirin enteric coated 81 milliGRAM(s) Oral daily  atorvastatin 80 milliGRAM(s) Oral at bedtime  diltiazem Infusion 5 mG/Hr (5 mL/Hr) IV Continuous <Continuous>  furosemide   Injectable 20 milliGRAM(s) IV Push daily  heparin   Injectable 5000 Unit(s) SubCutaneous every 12 hours  metoprolol tartrate 25 milliGRAM(s) Oral two times a day    MEDICATIONS  (PRN):            Vital Signs Last 24 Hrs  T(C): 36.7 (2022 07:15), Max: 37.7 (2022 10:28)  T(F): 98 (2022 07:15), Max: 99.8 (2022 10:28)  HR: 81 (2022 07:15) (81 - 117)  BP: 143/97 (2022 07:15) (118/89 - 153/110)  BP(mean): 11 (2022 19:50) (11 - 130)  RR: 17 (2022 07:15) (15 - 24)  SpO2: 97% (2022 07:15) (92% - 100%)            INTERPRETATION OF TELEMETRY: AFIB 80s    ECG:        LABS:                        13.7   9.96  )-----------( 151      ( 2022 06:59 )             40.5     04-12    141  |  112<H>  |  14  ----------------------------<  121<H>  3.7   |  21<L>  |  0.94    Ca    8.8      2022 06:59  Mg     2.4     04-11    TPro  6.6  /  Alb  3.6  /  TBili  1.5<H>  /  DBili  x   /  AST  27  /  ALT  25  /  AlkPhos  104  04-11        PT/INR - ( 2022 07:22 )   PT: 13.7 sec;   INR: 1.18 ratio         PTT - ( 2022 07:22 )  PTT:26.5 sec  Urinalysis Basic - ( 2022 14:42 )    Color: Yellow / Appearance: Slightly Turbid / S.005 / pH: x  Gluc: x / Ketone: Negative  / Bili: Negative / Urobili: Negative   Blood: x / Protein: Negative / Nitrite: Negative   Leuk Esterase: Negative / RBC: >50 /HPF / WBC 3-5   Sq Epi: x / Non Sq Epi: Occasional / Bacteria: Occasional      I&O's Summary    BNPSerum Pro-Brain Natriuretic Peptide: 7089 pg/mL ( @ 06:59)  Serum Pro-Brain Natriuretic Peptide: 7610 pg/mL ( @ 16:52)    RADIOLOGY & ADDITIONAL STUDIES:

## 2022-04-12 NOTE — PROGRESS NOTE ADULT - SUBJECTIVE AND OBJECTIVE BOX
Chief Complaint: fall. LE weakness.    Hpi: 62 yr old M with no PMHX was brought to ED by EMS on 4/11/22 s/p fall for LE weakness. Patient states at 5:30 am this morning he got up to turn off the TV when his leg gave way and he fell. Pt's sister lives downstairs and heard the fall; she went upstairs and helped him up to a chair, but then pt got up and fell again. Pt's sister was concerned he was having a stroke so she called EMS.     In ED the patient was evaluated, and code stroke was called. CTH showed no acute infarct or hemorrhage. CTA head and neck showed no LVO or stenosis or aneurysms. CT perfusion showed no core infarct. IV TPA was not given because LKN was last night, NIHSS 3. Pt was given 2 baby ASA. He was found to have new onset Afib in ED, and upon my evaluation BP was elevated at 156/118.    At bedside pt is awake and alert, is aware that he fell earlier this morning. He denies any Chest pain , numbness, tingling, headache, visual changes, dizziness or vertigo. Patient does have a hx of speech impediment but noticed he had some difficulty speaking this morning; Denies any dysphagia;  Pt's sister states that he has not seen a medical doctor in years, does not take any medications; Patient sister Nancy was at bedsided who he would like to make decisions for him [phone]    4/12/22:  All other review of systems is negative unless indicated above    Physical Exam:  T(C): 36.7 (12 Apr 2022 07:15), Max: 37.7 (11 Apr 2022 10:28)  T(F): 98 (12 Apr 2022 07:15), Max: 99.8 (11 Apr 2022 10:28)  HR: 81 (12 Apr 2022 07:15) (81 - 117)  BP: 143/97 (12 Apr 2022 07:15) (118/89 - 153/110)  BP(mean): 11 (11 Apr 2022 19:50) (11 - 130)  RR: 17 (12 Apr 2022 07:15) (15 - 24)  SpO2: 97% (12 Apr 2022 07:15) (91% - 100%)    Constitutional: NAD, awake and alert  HEENT: PERR, EOMI, Normal Hearing, MMM  Neck: Soft and supple, No LAD, No JVD  Respiratory: Breath sounds are clear bilaterally, No wheezing, rales or rhonchi  Cardiovascular: S1 and S2, regular rate and rhythm, no Murmurs, gallops or rubs  Gastrointestinal: Bowel Sounds present, soft, nontender, nondistended, no guarding, no rebound  Extremities: No peripheral edema  Vascular: 2+ peripheral pulses  Neurological: A/O x 3, no focal deficits  Musculoskeletal: 5/5 strength b/l upper and lower extremities  Skin: No rashes    Labs:                        13.7   9.96  )-----------( 151      ( 12 Apr 2022 06:59 )             40.5     04-11    143  |  113<H>  |  20  ----------------------------<  84  3.5   |  24  |  1.21    Ca    8.7      11 Apr 2022 07:22  Mg     2.4     04-11    TPro  6.6  /  Alb  3.6  /  TBili  1.5<H>  /  DBili  x   /  AST  27  /  ALT  25  /  AlkPhos  104  04-11    PT/INR - ( 11 Apr 2022 07:22 )   PT: 13.7 sec;   INR: 1.18 ratio      PTT - ( 11 Apr 2022 07:22 )  PTT:26.5 sec    Micro:  UA (-)  UTox (-)  BAL (-)  Salicylate (-)  Acetaminophen (-)  COVID-19 PCR: NotDetec (11 Apr 2022 06:51)     Radiology:    4/11/22: Xray Chest 1 View AP/PA: Mild to moderate CHF.    4/11/12: CT Angio Head/Neck w/ IV Cont: CT brain perfusion: Apparent increased mean transit time in the bilateral temporal lobes, more on the right, and right frontal lobe, as well as in the bilateral cerebellum, may be artifactual. Consider further evaluation with MRI. CTA head and neck: Patent cervical and intracranial major arterial vasculature without evidence of abrupt vessel cut off, hemodynamically significant stenosis, aneurysm, or dissection. Bilateral pleural effusions. CT Brain Stroke Protocol: No acute intracranial hemorrhage or mass effect.    Cardiac Testing:    BNP 7610    TroponinI hsT: <-76.27, <-96.59, <-61.67    Medications:  aspirin enteric coated 81 milliGRAM(s) Oral daily  atorvastatin 80 milliGRAM(s) Oral at bedtime  diltiazem Infusion 5 mG/Hr (5 mL/Hr) IV Continuous <Continuous>  furosemide   Injectable 20 milliGRAM(s) IV Push daily  heparin   Injectable 5000 Unit(s) SubCutaneous every 12 hours  metoprolol tartrate 25 milliGRAM(s) Oral two times a day    Home Medications: none   Chief Complaint: fall. LE weakness.    Hpi: 62 yr old M with no PMHX was brought to ED by EMS on 4/11/22 s/p fall for LE weakness. Patient states at 5:30 am this morning he got up to turn off the TV when his leg gave way and he fell. Pt's sister lives downstairs and heard the fall; she went upstairs and helped him up to a chair, but then pt got up and fell again. Pt's sister was concerned he was having a stroke so she called EMS.     In ED the patient was evaluated, and code stroke was called. CTH showed no acute infarct or hemorrhage. CTA head and neck showed no LVO or stenosis or aneurysms. CT perfusion showed no core infarct. IV TPA was not given because LKN was last night, NIHSS 3. Pt was given 2 baby ASA. He was found to have new onset Afib in ED, and upon my evaluation BP was elevated at 156/118.    At bedside pt is awake and alert, is aware that he fell earlier this morning. He denies any Chest pain , numbness, tingling, headache, visual changes, dizziness or vertigo. Patient does have a hx of speech impediment but noticed he had some difficulty speaking this morning; Denies any dysphagia;  Pt's sister states that he has not seen a medical doctor in years, does not take any medications; Patient sister Nancy was at bedsided who he would like to make decisions for him [phone]    4/12/22: Seen and examined at bedside. no acute distress. Case d/w ICU, NSGT and Neurology - upgrade to ICU. Repeat CTH 130PM. BP management SBP <150. stop ASA and Heparin SQ.  All other review of systems is negative unless indicated above    Physical Exam:  T(C): 36.7 (12 Apr 2022 07:15), Max: 37.7 (11 Apr 2022 10:28)  T(F): 98 (12 Apr 2022 07:15), Max: 99.8 (11 Apr 2022 10:28)  HR: 81 (12 Apr 2022 07:15) (81 - 117)  BP: 143/97 (12 Apr 2022 07:15) (118/89 - 153/110)  BP(mean): 11 (11 Apr 2022 19:50) (11 - 130)  RR: 17 (12 Apr 2022 07:15) (15 - 24)  SpO2: 97% (12 Apr 2022 07:15) (91% - 100%) room air    Constitutional: Awake and alert  HEENT: Normal Hearing, MMM  Neck: Soft and supple  Respiratory: Breath sounds are clear bilaterally  Cardiovascular: S1 and S2, regular rate and rhythm, +murmur  Gastrointestinal: Bowel Sounds present, soft, nontender, nondistended, no guarding, no rebound  Extremities: No peripheral edema  Vascular: 2+ peripheral pulses  Neurological: A/O x 3, Slight LT facial droop, +LT pronator drift/ weakness  Skin: No rashes    Labs:                        13.7   9.96  )-----------( 151      ( 12 Apr 2022 06:59 )             40.5     04-11    143  |  113<H>  |  20  ----------------------------<  84  3.5   |  24  |  1.21    Ca    8.7      11 Apr 2022 07:22  Mg     2.4     04-11    TPro  6.6  /  Alb  3.6  /  TBili  1.5<H>  /  DBili  x   /  AST  27  /  ALT  25  /  AlkPhos  104  04-11    PT/INR - ( 11 Apr 2022 07:22 )   PT: 13.7 sec;   INR: 1.18 ratio      PTT - ( 11 Apr 2022 07:22 )  PTT:26.5 sec    Micro:  UA (-)  UTox (-)  BAL (-)  Salicylate (-)  Acetaminophen (-)  COVID-19 PCR: NotDetec (11 Apr 2022 06:51)     Radiology:    4/12/22: MR Head No Cont: Acute infarction within the anterior RIGHT basal ganglia and straightening restricted diffusion and central hemorrhage measuring 2.4 cm, with mass effect on the anterior horn of the RIGHT lateral ventricle. Smaller acute infarctions involve the anterior inferior RIGHT frontal lobe, the posterior RIGHT basal ganglia and posterior RIGHT insular cortex as well as the posterior RIGHT temporal on lateral RIGHT occipital cortex,  with restricted diffusion.    4/11/22: Xray Chest 1 View AP/PA: Mild to moderate CHF.    4/11/12: CT Angio Head/Neck w/ IV Cont: CT brain perfusion: Apparent increased mean transit time in the bilateral temporal lobes, more on the right, and right frontal lobe, as well as in the bilateral cerebellum, may be artifactual. Consider further evaluation with MRI. CTA head and neck: Patent cervical and intracranial major arterial vasculature without evidence of abrupt vessel cut off, hemodynamically significant stenosis, aneurysm, or dissection. Bilateral pleural effusions. CT Brain Stroke Protocol: No acute intracranial hemorrhage or mass effect.    Cardiac Testing:    BNP 7610    TroponinI hsT: <-76.27, <-96.59, <-61.67    4/12/22: ECHO: Moderate (2+) mitral regurgitation is present. Normal aortic valve structure and function. Moderate (2+) tricuspid valve regurgitation is present. The left ventricle cavity is mildly dilated. Estimated left ventricular ejection fraction is 15 %. Severe, diffuse hypokinesis of the left ventricle is present. The right ventricle exhibits mild diffuse hypokinesis, and mild depression of contractility. Pleural effusion - is present.    Medications:  atorvastatin 80 milliGRAM(s) Oral at bedtime  chlorhexidine 4% Liquid 1 Application(s) Topical <User Schedule>  diltiazem Infusion 5 mG/Hr (5 mL/Hr) IV Continuous <Continuous>  metoprolol tartrate 25 milliGRAM(s) Oral two times a day    MEDICATIONS  (PRN):  hydrALAZINE Injectable 5 milliGRAM(s) IV Push every 6 hours PRN SBP >150    Home Medications: none

## 2022-04-12 NOTE — DISCHARGE NOTE NURSING/CASE MANAGEMENT/SOCIAL WORK - NSDCFUADDAPPT_GEN_ALL_CORE_FT
folow up cj made with dr. Roy for Monday May 9th at 3:30.  follow up cj made with dr. Dee for Wednesday August 17th at 4:20.  office will call pt if they have a earlier cj. folow up cj made with dr. Roy for Monday May 9th at 3:30.  follow up cj made with dr. Dee for Wednesday August 17th at 4:20.  office will call pt if they have a earlier cj.    Follow-up appointment with Dr. Raza  Day: Monday  Date: 5/2/22  Time: 2:00pm  Phone: [phone] follow up cj made with dr. Roy for Monday May 9th at 3:30.  follow up cj made with dr. Dee for Wednesday August 17th at 4:20.  office will call pt if they have a earlier cj.  follow up appt made with Dr. Escalera at [facility] for Tues, May 3rd at 1pm. Bring ID and insurance card.    Follow-up appointment with Dr. Raza  Day: Monday  Date: 5/2/22  Time: 2:00pm  Phone: [phone]

## 2022-04-12 NOTE — PROGRESS NOTE ADULT - ASSESSMENT
62 yr old M with no PMHX was brought to ED by EMS on 4/11/22 s/p fall for LE weakness. Patient states at 5:30 am this morning he got up to turn off the TV when his leg gave way and he fell. Pt's sister lives downstairs and heard the fall; she went upstairs and helped him up to a chair, but then pt got up and fell again. Pt's sister was concerned he was having a stroke so she called EMS.     In ED the patient was evaluated, and code stroke was called. CTH showed no acute infarct or hemorrhage. CTA head and neck showed no LVO or stenosis or aneurysms. CT perfusion showed no core infarct. IV TPA was not given because LKN was last night, NIHSS 4-5. Pt was given 2 baby ASA. He was found to have new onset Afib in ED, and upon my evaluation his BP was elevated at 156/118.    #Acute right CVA, ?embolic d/t new onset Afib. Patient also with uncontrolled HTN  -Acute strokes with hemorrhagic conversion.  -Patient will be upgraded to ICU for closer observation, q1 hour neuro checks  -Neurosurgery called  -CT head  -Hold aspirin and AC  -Will stop permissive hypertension at this time.  Keep SBP < 140  -Continue statin    # New onset a-fib  -New onset a-fib  -EF on echo ~ 15%.  -Have to hold AC for now  -Cardiology following.    Will follow.

## 2022-04-13 LAB
ANION GAP SERPL CALC-SCNC: 7 MMOL/L — SIGNIFICANT CHANGE UP (ref 5–17)
BUN SERPL-MCNC: 15 MG/DL — SIGNIFICANT CHANGE UP (ref 7–23)
CALCIUM SERPL-MCNC: 8.5 MG/DL — SIGNIFICANT CHANGE UP (ref 8.5–10.1)
CHLORIDE SERPL-SCNC: 106 MMOL/L — SIGNIFICANT CHANGE UP (ref 96–108)
CO2 SERPL-SCNC: 23 MMOL/L — SIGNIFICANT CHANGE UP (ref 22–31)
CREAT SERPL-MCNC: 0.91 MG/DL — SIGNIFICANT CHANGE UP (ref 0.5–1.3)
EGFR: 94 ML/MIN/1.73M2 — SIGNIFICANT CHANGE UP
GLUCOSE SERPL-MCNC: 104 MG/DL — HIGH (ref 70–99)
HCT VFR BLD CALC: 42.3 % — SIGNIFICANT CHANGE UP (ref 39–50)
HGB BLD-MCNC: 14.5 G/DL — SIGNIFICANT CHANGE UP (ref 13–17)
MAGNESIUM SERPL-MCNC: 2 MG/DL — SIGNIFICANT CHANGE UP (ref 1.6–2.6)
MCHC RBC-ENTMCNC: 30.3 PG — SIGNIFICANT CHANGE UP (ref 27–34)
MCHC RBC-ENTMCNC: 34.3 GM/DL — SIGNIFICANT CHANGE UP (ref 32–36)
MCV RBC AUTO: 88.5 FL — SIGNIFICANT CHANGE UP (ref 80–100)
PHOSPHATE SERPL-MCNC: 3.7 MG/DL — SIGNIFICANT CHANGE UP (ref 2.5–4.5)
PLATELET # BLD AUTO: 170 K/UL — SIGNIFICANT CHANGE UP (ref 150–400)
POTASSIUM SERPL-MCNC: 3.7 MMOL/L — SIGNIFICANT CHANGE UP (ref 3.5–5.3)
POTASSIUM SERPL-SCNC: 3.7 MMOL/L — SIGNIFICANT CHANGE UP (ref 3.5–5.3)
RBC # BLD: 4.78 M/UL — SIGNIFICANT CHANGE UP (ref 4.2–5.8)
RBC # FLD: 13.2 % — SIGNIFICANT CHANGE UP (ref 10.3–14.5)
SODIUM SERPL-SCNC: 136 MMOL/L — SIGNIFICANT CHANGE UP (ref 135–145)
WBC # BLD: 14.81 K/UL — HIGH (ref 3.8–10.5)
WBC # FLD AUTO: 14.81 K/UL — HIGH (ref 3.8–10.5)

## 2022-04-13 PROCEDURE — 99232 SBSQ HOSP IP/OBS MODERATE 35: CPT

## 2022-04-13 PROCEDURE — 93010 ELECTROCARDIOGRAM REPORT: CPT

## 2022-04-13 PROCEDURE — 99233 SBSQ HOSP IP/OBS HIGH 50: CPT

## 2022-04-13 RX ORDER — FUROSEMIDE 40 MG
20 TABLET ORAL DAILY
Refills: 0 | Status: DISCONTINUED | OUTPATIENT
Start: 2022-04-13 | End: 2022-04-18

## 2022-04-13 RX ORDER — METOPROLOL TARTRATE 50 MG
50 TABLET ORAL
Refills: 0 | Status: DISCONTINUED | OUTPATIENT
Start: 2022-04-13 | End: 2022-04-23

## 2022-04-13 RX ORDER — METOPROLOL TARTRATE 50 MG
25 TABLET ORAL THREE TIMES A DAY
Refills: 0 | Status: DISCONTINUED | OUTPATIENT
Start: 2022-04-13 | End: 2022-04-13

## 2022-04-13 RX ADMIN — Medication 25 MILLIGRAM(S): at 06:42

## 2022-04-13 RX ADMIN — Medication 50 MILLIGRAM(S): at 21:45

## 2022-04-13 RX ADMIN — Medication 20 MILLIGRAM(S): at 10:47

## 2022-04-13 RX ADMIN — ATORVASTATIN CALCIUM 80 MILLIGRAM(S): 80 TABLET, FILM COATED ORAL at 21:45

## 2022-04-13 RX ADMIN — Medication 50 MILLIGRAM(S): at 10:47

## 2022-04-13 RX ADMIN — CHLORHEXIDINE GLUCONATE 1 APPLICATION(S): 213 SOLUTION TOPICAL at 04:17

## 2022-04-13 NOTE — PROGRESS NOTE ADULT - NEUROLOGICAL DETAILS
alert and oriented x 3/responds to pain/responds to verbal commands/no spontaneous movement/strength decreased

## 2022-04-13 NOTE — PROGRESS NOTE ADULT - SUBJECTIVE AND OBJECTIVE BOX
Cardiology Progress Note    HPI: 62 yr old M with no PMHX was brought to ED by EMS on 22 s/p fall for LE weakness. Patient states at 5:30 am this morning he got up to turn off the TV when his leg gave way and he fell. Pt's sister lives downstairs and heard the fall; she went upstairs and helped him up to a chair, but then pt got up and fell again. Pt's sister was concerned he was having a stroke so she called EMS.  In ED the patient was evaluated, and code stroke was called. CTH showed no acute infarct or hemorrhage. CTA head and neck showed no LVO or stenosis or aneurysms. CT perfusion showed no core infarct. IV TPA was not given because LKN was last night, NIHSS 3. Pt was given 2 baby ASA. He was found to have new onset Afib in ED, and upon my evaluation BP was elevated at 156/118.  At bedside pt is awake and alert, is aware that he fell earlier this morning. He denies any Chest pain , numbness, tingling, headache, visual changes, dizziness or vertigo. Patient does have a hx of speech impediment but noticed he had some difficulty speaking this morning; Denies any dysphagia;  Pt's sister states that he has not seen a medical doctor in years, does not take any medications; Patient sister Nancy was at bedsided who he would like to make decisions for him [phone].    . Pt tx to ICU with hemorraghic conversion of acute CVA.  Afib in 90-100s range. Remains lethargic. Case d/w nursing.     PAST MEDICAL & SURGICAL HISTORY: None    FAMILY HISTORY: mother  at 87 from dementia; Father  at abdelrahman age of 87 from AFIB    Social Hx:  Nonsmoker, no drug or alcohol use (2022 12:00)    Allergies  No Known Allergies    MEDICATIONS  (STANDING):  aspirin enteric coated 81 milliGRAM(s) Oral daily  atorvastatin 80 milliGRAM(s) Oral at bedtime  diltiazem Infusion 5 mG/Hr (5 mL/Hr) IV Continuous <Continuous>  heparin   Injectable 5000 Unit(s) SubCutaneous every 12 hours  sodium chloride 0.9%. 1000 milliLiter(s) (50 mL/Hr) IV Continuous <Continuous>    MEDICATIONS  (PRN):    Vital Signs Last 24 Hrs  T(C): 37.7 (2022 10:28), Max: 37.7 (2022 10:28)  T(F): 99.8 (2022 10:28), Max: 99.8 (2022 10:28)  HR: 117 (2022 15:) (100 - 122)  BP: 140/106 (2022 15:) (139/109 - 165/111)  BP(mean): 117 (2022 15:) (117 - 126)  RR: 21 (2022 15:) (16 - 24)  SpO2: 98% (2022 15:) (91% - 98%)    REVIEW OF SYSTEMS:    CONSTITUTIONAL:  As per HPI.  HEENT:  Eyes:  No diplopia or blurred vision. ENT:  No earache, sore throat or runny nose.  CARDIOVASCULAR:  No pressure, squeezing, strangling, tightness, heaviness or aching about the chest, neck, axilla or epigastrium.  RESPIRATORY:  No cough, shortness of breath, PND or orthopnea.  GASTROINTESTINAL:  No nausea, vomiting or diarrhea.  MUSCULOSKELETAL:  As per HPI.  SKIN:  No change in skin, hair or nails.  NEUROLOGIC:  L sided weakness, lethargy    PHYSICAL EXAMINATION:    GENERAL APPEARANCE:  Pt. is not currently dyspneic, in no distress. Pt. is alert, oriented, and pleasant.  HEENT:  Pupils are normal and react normally. No icterus. Mucous membranes well colored.  NECK:  Supple. No lymphadenopathy. Jugular venous pressure not elevated. Carotids equal.   HEART:   The cardiac impulse has a normal quality. There are no murmurs, rubs or gallops noted  CHEST:  Chest is clear to auscultation. Normal respiratory effort.  ABDOMEN:  Soft and nontender.   EXTREMITIES:  There is no edema.   HF: A x O x 3. Appropriately interactive, normal affect but hesitant. No Aphasia, ?mild dysarthria  CN: VIVEK, EOMI, VFF, facial sensation normal, flattened nasolabial fold on left, , tongue midline  Motor: +L pronator drift but doesn't hit bed, LLE drift w/o hitting bed, Strength 5/5 RUE and RLE,     LABS:                        14.3   12.84 )-----------( 150      ( 2022 07:22 )             43.2     -    143  |  113<H>  |  20  ----------------------------<  84  3.5   |  24  |  1.21    Ca    8.7      2022 07:22  Mg     2.4         TPro  6.6  /  Alb  3.6  /  TBili  1.5<H>  /  DBili  x   /  AST  27  /  ALT  25  /  AlkPhos  104  11    LIVER FUNCTIONS - ( 2022 07:22 )  Alb: 3.6 g/dL / Pro: 6.6 gm/dL / ALK PHOS: 104 U/L / ALT: 25 U/L / AST: 27 U/L / GGT: x           PT/INR - ( 2022 07:22 )   PT: 13.7 sec;   INR: 1.18 ratio       PTT - ( 2022 07:22 )  PTT:26.5 sec    Urinalysis Basic - ( 2022 14:42 )    Color: Yellow / Appearance: Slightly Turbid / S.005 / pH: x  Gluc: x / Ketone: Negative  / Bili: Negative / Urobili: Negative   Blood: x / Protein: Negative / Nitrite: Negative   Leuk Esterase: Negative / RBC: >50 /HPF / WBC 3-5   Sq Epi: x / Non Sq Epi: Occasional / Bacteria: Occasional    EKG: afib    TELEMETRY: Afib     CARDIAC TESTS: < from: TTE Echo Complete w/o Contrast w/ Doppler (22 @ 09:02) >  Moderate (2+) mitral regurgitation is present.   Normal aortic valve structure and function.   Moderate (2+) tricuspid valve regurgitation is present.   The left ventricle cavity is mildly dilated.   Estimated left ventricular ejection fraction is 15 %.   Severe, diffuse hypokinesis of the left ventricle is present.   The right ventricle exhibits mild diffuse hypokinesis, and mild   depression   of contractility.   Pleural effusion - is present.    RADIOLOGY & ADDITIONAL STUDIES: < from: CT Angio Neck w/ IV Cont (22 @ 07:42) >  IMPRESSION:  CT brain perfusion: Apparent increased mean transit time in the bilateral   temporal lobes, more on the right, and right frontal lobe, as wellas in   the bilateral cerebellum, may be artifactual. Consider further evaluation   with MRI.    CTA head and neck:  -Patent cervical and intracranial major arterial vasculature without   evidence of abrupt vessel cut off, hemodynamically significantstenosis,   aneurysm, or dissection.  -Bilateral pleural effusions.    < end of copied text >  < from: Xray Chest 1 View AP/PA. (22 @ 07:46) >  IMPRESSION: Mild to moderate CHF.    < from: MR Head No Cont (22 @ 09:44) >  IMPRESSION:   Acute infarction within the anterior RIGHT basal ganglia   and straightening restricted diffusion and central hemorrhage measuring   2.4 cm, with mass effect on the anterior horn of the RIGHT lateral   ventricle. Smaller acute infarctions involve the anterior inferior RIGHT   frontal lobe, the posterior RIGHT basal ganglia and posterior RIGHT   insular cortex as well as the posterior RIGHT temporal on lateral RIGHT   occipital cortex,  with restricted diffusion.    < end of copied text >      ASSESSMENT & PLAN:

## 2022-04-13 NOTE — PROGRESS NOTE ADULT - ASSESSMENT
62 yr old M with no PMHX was brought to ED by EMS on 4/11/22 s/p fall for LE weakness. Patient states at 5:30 am this morning he got up to turn off the TV when his leg gave way and he fell. Pt's sister lives downstairs and heard the fall; she went upstairs and helped him up to a chair, but then pt got up and fell again. Pt's sister was concerned he was having a stroke so she called EMS. CTH showed no acute infarct or hemorrhage. CTA head and neck showed no LVO or stenosis or aneurysms. CT perfusion showed no core infarct. IV TPA was not given because LKN was last night, NIHSS 4-5. Pt was given 2 baby ASA. He was found to have new onset Afib in ED, and upon my evaluation his BP was elevated at 156/118.  MRI Brain this morning revealed Acute right CVA, with hemorrhagic conversion    Plan:  - No acute neurosurgical intervention  - SBP <150  - Advance diet/activity as tolerated  - Would weight cardiac risk stratification for starting anticoagulation in setting of acute hemorrhage  - Follow up HCT ordered for tomorrow  - Would defer to neurology regarding AC use  - If acute change in mental status order stat HCT  - Continue care as per medical team    Discussed with Dr. Burkett

## 2022-04-13 NOTE — PROGRESS NOTE ADULT - SUBJECTIVE AND OBJECTIVE BOX
HPI:  Patient is a 62 yr old male with no PMHX was brought to ED by EMS on 4/11/22 s/p fall for LE weakness. Patient states at 5:30 am this morning he got up to turn off the TV when his leg gave way and he fell. Pt's sister lives downstairs and heard the fall; she went upstairs and helped him up to a chair, but then pt got up and fell again. Pt's sister was concerned he was having a stroke so she called EMS.     In ED the patient was evaluated, and code stroke was called. CTH showed no acute infarct or hemorrhage. CTA head and neck showed no LVO or stenosis or aneurysms. CT perfusion showed no core infarct. IV TPA was not given because LKN was last night, NIHSS 3. Pt was given 2 baby ASA. He was found to have new onset Afib in ED, and upon my evaluation BP was elevated at 156/118.    At bedside pt is awake and alert, is aware that he fell earlier this morning. He denies any Chest pain , numbness, tingling, headache, visual changes, dizziness or vertigo. Patient does have a hx of speech impediment but noticed he had some difficulty speaking this morning; Denies any dysphagia;  Pt's sister states that he has not seen a medical doctor in years, does not take any medications; Patient sister Nancy was at bedsided who he would like to make decisions for him [phone]    Neurosurgery called this morning for noted hemorrhage on MRI brain, acute infarct right basal ganglia. Patient seen and examined on [address]. Patient drowsy, awakens for exam. He denies headache, n/v, numb.tingling, new weakness.    4/13- Patient seen and examined. No acute events overnight. Head CT yesterday stable. Patient drowsy awakens for exam, following commands minimally verbal. Denies headache, n/v, numb/tingling, weakness.    Vital Signs Last 24 Hrs  T(C): 36.9 (13 Apr 2022 08:00), Max: 37.3 (13 Apr 2022 00:00)  T(F): 98.5 (13 Apr 2022 08:00), Max: 99.1 (13 Apr 2022 00:00)  HR: 89 (13 Apr 2022 13:00) (89 - 110)  BP: 111/74 (13 Apr 2022 13:00) (109/86 - 154/111)  BP(mean): 82 (13 Apr 2022 13:00) (79 - 128)  RR: 17 (13 Apr 2022 13:00) (11 - 25)  SpO2: 95% (13 Apr 2022 13:00) (92% - 100%)    MEDICATIONS  (STANDING):  atorvastatin 80 milliGRAM(s) Oral at bedtime  chlorhexidine 4% Liquid 1 Application(s) Topical <User Schedule>  furosemide   Injectable 20 milliGRAM(s) IV Push daily  metoprolol tartrate 50 milliGRAM(s) Oral two times a day    MEDICATIONS  (PRN):  hydrALAZINE Injectable 5 milliGRAM(s) IV Push every 6 hours PRN SBP >150      PHYSICAL EXAM:  Constitutional: awake and alert.  HEENT: PERRLA, EOMI,   Neck: Supple.  Respiratory: Breath sounds are clear bilaterally  Cardiovascular: S1 and S2, irregular  rhythm  Gastrointestinal: soft, nontender  Extremities:  no edema  Musculoskeletal: no joint swelling/tenderness, no abnormal movements  Skin: No rashes    Neurological exam:  HF: A x O x 3. Mild dysarthria. No Aphasia or paraphasic errors. Naming /repetition intact   CN: VIVEK, EOMI, VFF, facial sensation normal, left NLFD, tongue midline, Palate moves equally, SCM equal bilaterally  Motor: Left pronator drift, Strength 5/5 except LUE 4/5, LLE 4-/5, normal bulk and tone, no tremor, rigidity or bradykinesia.    Sens: Intact to light touch  Reflexes: Symmetric and normal, downgoing toes b/l  Coord:  Left FTN dysmetria, SHEELA intact   Gait/Balance: Cannot test    NIHSS: 5          LABS:                         14.5   14.81 )-----------( 170      ( 13 Apr 2022 05:42 )             42.3     04-13    136  |  106  |  15  ----------------------------<  104<H>  3.7   |  23  |  0.91    Ca    8.5      13 Apr 2022 05:42  Phos  3.7     04-13  Mg     2.0     04-13        04-12 Chol 149 LDL -- HDL 43 Trig 81      RADIOLOGY:  CT Head No Cont (04.12.22 @ 12:55)  IMPRESSION:  No interval change since prior MRI. Stable hemorrhage within an infarct   in the right basal ganglia is reidentified.   Mild mass effect on the   right ventricle is reidentified.    No acute intracranial findings.

## 2022-04-13 NOTE — PROGRESS NOTE ADULT - SUBJECTIVE AND OBJECTIVE BOX
Interval History:  22: He denies having any new complaints.    MEDICATIONS  (STANDING):  atorvastatin 80 milliGRAM(s) Oral at bedtime  chlorhexidine 4% Liquid 1 Application(s) Topical <User Schedule>  furosemide   Injectable 20 milliGRAM(s) IV Push daily  metoprolol tartrate 50 milliGRAM(s) Oral two times a day    MEDICATIONS  (PRN):  hydrALAZINE Injectable 5 milliGRAM(s) IV Push every 6 hours PRN SBP >150      Allergies    No Known Allergies    Intolerances        PHYSICAL EXAM:  Vital Signs Last 24 Hrs  T(F): 98.5 (22 @ 08:00)  HR: 93 (22 @ 10:00)  BP: 142/102 (22 @ 10:00)  RR: 12 (22 @ 10:00)    GENERAL: NAD, well-groomed  HEAD:  Atraumatic, Normocephalic  Neuro:  Awake, alert, no aphasia  CN: PERRL, EOMI, no nystagmus, slight flattening of left nasolabial fold, mild dysarthria,  tongue protrudes in the midline  motor: + left drift pronator drift, 4/5 in LUE, 4-/5 in LLE, Left leg drift, 5/5 in RUE and RLE  sensory: intact to light touch  coordination: no dysmetria  DTRs: symmetric, plantar responses flexor bilaterally    LABS:                        14.5   14.81 )-----------( 170      ( 2022 05:42 )             42.3     04-13    136  |  106  |  15  ----------------------------<  104<H>  3.7   |  23  |  0.91    Ca    8.5      2022 05:42  Phos  3.7     04-13  Mg     2.0     04-13      PT/INR - ( 2022 11:26 )   PT: 13.9 sec;   INR: 1.20 ratio           Urinalysis Basic - ( 2022 14:42 )    Color: Yellow / Appearance: Slightly Turbid / S.005 / pH: x  Gluc: x / Ketone: Negative  / Bili: Negative / Urobili: Negative   Blood: x / Protein: Negative / Nitrite: Negative   Leuk Esterase: Negative / RBC: >50 /HPF / WBC 3-5   Sq Epi: x / Non Sq Epi: Occasional / Bacteria: Occasional        RADIOLOGY & ADDITIONAL STUDIES:  CT Angio Neck w/ IV Cont (22 @ 07:42) >    IMPRESSION:  CT brain perfusion: Apparent increased mean transit time in the bilateral   temporal lobes, more on the right, and right frontal lobe, as well as in   the bilateral cerebellum, may be artifactual. Consider further evaluation   with MRI.    CTA head and neck:    -Moderate atherosclerotic calcifications along the bilateral carotid   bifurcations without significant stenosis. The internal carotid arteries   are patent without significant stenosis. Dense atherosclerotic   calcifications along the bilateral cavernous segments without significant   stenosis.  -Patent cervical and intracranial major arterial vasculature without   evidence of abrupt vessel cut off, hemodynamically significant stenosis,   aneurysm, or dissection.  -Bilateral pleural effusions.    CT Brain Stroke Protocol (22 @ 07:31) >    IMPRESSION:  No acute intracranial hemorrhage or mass effect.    MRI head 22:  Acute infarction within the anterior RIGHT basal ganglia   and straightening restricted diffusion and central hemorrhage measuring   2.4 cm, with mass effect on the anterior horn of the RIGHT lateral   ventricle. Smaller acute infarctions involve the anterior inferior RIGHT   frontal lobe, the posterior RIGHT basal ganglia and posterior RIGHT   insular cortex as well as the posterior RIGHT temporal on lateral RIGHT   occipital cortex,  with restricted diffusion.    Echo 22:     Moderate (2+) mitral regurgitation is present.   Normal aortic valve structure and function.   Moderate (2+) tricuspid valve regurgitation is present.   The left ventricle cavity is mildly dilated.   Estimated left ventricular ejection fraction is 15 %.   Severe, diffuse hypokinesis of the left ventricle is present.   The right ventricle exhibits mild diffuse hypokinesis, and mild   depression   of contractility.   Pleural effusion - is present.

## 2022-04-13 NOTE — PROGRESS NOTE ADULT - ASSESSMENT
A/P: 62 yr old M with no PMHX was brought to ED by EMS on 4/11/22 s/p fall for LE weakness.     1. Atrial fibrillation. Unknown h/o afib. In setting of CVA. Now with hemorrhagic conversion on MR.  No anticoagulation. Will continue a rate control strategy.   In setting of low EF, will d/c cardizem gtt and titrate Bbl.     2. CHF. Severely reduced LVEF of 15%- unknown etiology. Will cont medical mgmt with Bbl and will start low dose ACEi.   Cont IV lasix 20mg BID for No ischemic eval until more stable.      3. +Troponins. Likely demand in setting of CVA, afib, CHF.   Medical mgmt as above.     4. DVT proph.

## 2022-04-13 NOTE — PHYSICAL THERAPY INITIAL EVALUATION ADULT - MANUAL MUSCLE TESTING RESULTS, REHAB EVAL
RUE/LE 4-5/5, L shld 3+ at 90 deg, (+downward drift LUE), elbow 3+, L hip flex 3-, knee flex 3+, KE 3/5, DF 2-, PF 2+

## 2022-04-13 NOTE — PHYSICAL THERAPY INITIAL EVALUATION ADULT - ACTIVE RANGE OF MOTION EXAMINATION, REHAB EVAL
L shld elev lags and w/ compensations, L hip flex w/ ER/bilateral upper extremity Active ROM was WFL (within functional limits)/bilateral  lower extremity Active ROM was WFL (within functional limits)

## 2022-04-13 NOTE — PHYSICAL THERAPY INITIAL EVALUATION ADULT - PERTINENT HX OF CURRENT PROBLEM, REHAB EVAL
pt to ED by EMS on 4/11/22 s/p fall d/t LE weakness. Pt states at 5:30 am he got up to turn off  TV when his leg gave way and he fell. Pt's sister downstairs heard fall; she went upstairs helped him up to chair, but pt got up and fell again. Pt's sister was concerned he was having a stroke so she called EMS.code stroke in ED. CTH neg. CTA head and neck showed no LVO or stenosis or aneurysms. CT perfusion showed no core infarct. IV TPA was not given because LKN was night prior. BP in /118
see PT eval doc charted 4/13. MRI yest a.m revealed Acute right CVA, with hemorrhagic conversion. pt [facility]. per Dr. Abernathy, pt ok for PT today

## 2022-04-13 NOTE — PROGRESS NOTE ADULT - TIME BILLING
63 y/o male initially presented after a fall due to LLE weakness, found to have R basal ganglia CVA, NIHSS 3, not a tPA candidate, new onset A.fib and newly diagnosed systolic heart failure    Subsequently found to have hemorrhage in the infarct area and A.fib with RVR, transferred to ICU 4/12 for q1 neurochecks     He had received aspirin 162 mg on admission but no therapeutic AC       Plan:    CT head and neuro exam stable   No neurosurgical intervention   Change neurochecks to q4  PT eval   Continue holding AC, AP     A.fib with HR 90s  Off dilt drip per cardiology given systolic dysfn   Continue BB, statin, lasix   Add ACEI if BP allows   Defer AC to cardiology, neurosx   He will need ischemic eval when stable     Tolerating po diet       Stable for tele, d/w neurosurgery       DVT ppx: SCDs  Nutrition: po  Central line: no  Arterial line: no  Oropeza: no  Restraints: no  Activity: oob    Code status: full     Disposition: stable for tele No 61 y/o male initially presented after a fall due to LLE weakness, found to have R basal ganglia CVA, NIHSS 3, not a tPA candidate, new onset A.fib and newly diagnosed systolic heart failure    Subsequently found to have hemorrhage in the infarct area and A.fib with RVR, transferred to ICU 4/12 for q1 neurochecks     He had received aspirin 162 mg on admission but no therapeutic AC       Plan:    CT head and neuro exam stable   No neurosurgical intervention   Change neurochecks to q4  PT eval   Continue holding AC, AP     A.fib with HR 90s  Off dilt drip per cardiology given systolic dysfn   Continue BB, statin, lasix   Add ACEI if BP allows   Defer AC to cardiology, neurosx   He will need ischemic eval when stable     Resp status is stable on RA     Tolerating po diet       Stable for tele, d/w neurosurgery       DVT ppx: SCDs  Nutrition: po  Central line: no  Arterial line: no  Oropeza: no  Restraints: no  Activity: oob    Code status: full     Disposition: stable for tele, sign out given to hospitalist at 16.30 61 y/o male (haven't seen a physician or had wellness check in decades) initially presented after a fall due to LLE weakness, found to have R basal ganglia CVA, NIHSS 3, not a tPA candidate, new onset A.fib and newly diagnosed systolic heart failure    Subsequently found to have hemorrhage in the infarct area and A.fib with RVR, transferred to ICU 4/12 for q1 neurochecks     He had received aspirin 162 mg on admission but no therapeutic AC       Plan:    CT head and neuro exam stable   No neurosurgical intervention   Change neurochecks to q4  PT eval   Continue holding AC, AP     A.fib with HR 90s  Off dilt drip per cardiology given systolic dysfn   Continue BB, statin, lasix   Add ACEI if BP allows   Defer AC to cardiology, neurosx   He will need ischemic eval when stable     Resp status is stable on RA     Tolerating po diet       Stable for tele, d/w neurosurgery       DVT ppx: SCDs  Nutrition: po  Central line: no  Arterial line: no  Oropeza: no  Restraints: no  Activity: oob    Code status: full     Disposition: stable for tele, sign out given to hospitalist at 16.30    Updated: sister at bedside - told her about new A.fib and heart failure diagnoses and that he will need more work up for his heart in the future, she will have him continue to follow with Dr. Raza as outpatient and find a PCP for him

## 2022-04-13 NOTE — PHYSICAL THERAPY INITIAL EVALUATION ADULT - FOLLOWS COMMANDS/ANSWERS QUESTIONS, REHAB EVAL
A&O4, asymptomatic
Patient AOx3 and asymptomatic. Patient resting comfortably in bed.
Patient AOx3 and asymptomatic. Patient resting comfortably in bed.
Pt is A+O3. Pt complaining of hiccups.
RN unable draw AM labs, except for type and screen. ER RN and [address] [place] [address] RN both unable to draw labs this AM. Patient has right AV fistula with arm precautions.
alert and oriented x3; confused at times. scar noted on left arm. Right av fistula; strong bruit. no complains of chest pain, SOB, HA or N/V.
patient reports no changes in well being, no signs and symptoms of distress noted on assessment.
able to follow single-step instructions

## 2022-04-13 NOTE — PROGRESS NOTE ADULT - SUBJECTIVE AND OBJECTIVE BOX
Patient is a 62y old  Male who presents with a chief complaint of Left sided weakness (13 Apr 2022 13:28)    24 hour events: stable neuro exam     PAST MEDICAL & SURGICAL HISTORY:      Allergies    No Known Allergies    Intolerances      REVIEW OF SYSTEMS: SEE BELOW       ICU Vital Signs Last 24 Hrs  T(C): 36.8 (13 Apr 2022 12:00), Max: 37.3 (13 Apr 2022 00:00)  T(F): 98.2 (13 Apr 2022 12:00), Max: 99.1 (13 Apr 2022 00:00)  HR: 94 (13 Apr 2022 15:00) (89 - 110)  BP: 126/80 (13 Apr 2022 15:00) (109/86 - 154/111)  BP(mean): 91 (13 Apr 2022 15:00) (79 - 128)  ABP: --  ABP(mean): --  RR: 32 (13 Apr 2022 15:00) (11 - 32)  SpO2: 93% (13 Apr 2022 15:00) (92% - 100%)      CAPILLARY BLOOD GLUCOSE          I&O's Summary    12 Apr 2022 07:01  -  13 Apr 2022 07:00  --------------------------------------------------------  IN: 0 mL / OUT: 925 mL / NET: -925 mL            MEDICATIONS  (STANDING):  atorvastatin 80 milliGRAM(s) Oral at bedtime  chlorhexidine 4% Liquid 1 Application(s) Topical <User Schedule>  furosemide   Injectable 20 milliGRAM(s) IV Push daily  metoprolol tartrate 50 milliGRAM(s) Oral two times a day      MEDICATIONS  (PRN):      PHYSICAL EXAM: SEE BELOW                          14.5   14.81 )-----------( 170      ( 13 Apr 2022 05:42 )             42.3       04-13    136  |  106  |  15  ----------------------------<  104<H>  3.7   |  23  |  0.91    Ca    8.5      13 Apr 2022 05:42  Phos  3.7     04-13  Mg     2.0     04-13            PT/INR - ( 12 Apr 2022 11:26 )   PT: 13.9 sec;   INR: 1.20 ratio

## 2022-04-13 NOTE — PROGRESS NOTE ADULT - ASSESSMENT
62 yr old M with no PMHX was brought to ED by EMS on 4/11/22 s/p fall for LE weakness. Patient states at 5:30 am this morning he got up to turn off the TV when his leg gave way and he fell. Pt's sister lives downstairs and heard the fall; she went upstairs and helped him up to a chair, but then pt got up and fell again. Pt's sister was concerned he was having a stroke so she called EMS.     In ED the patient was evaluated, and code stroke was called. CTH showed no acute infarct or hemorrhage. CTA head and neck showed no LVO or stenosis or aneurysms. CT perfusion showed no core infarct. IV TPA was not given because LKN was last night, NIHSS 4-5. Pt was given 2 baby ASA. He was found to have new onset Afib in ED, and upon my evaluation his BP was elevated at 156/118.    #Acute right CVA, ?embolic d/t new onset Afib. Patient also with uncontrolled HTN  -Acute strokes with hemorrhagic conversion.  -Clinically stable.  -Repeat CT head on 4/12/22 showed stable hemorrhage compared to MRI earlier in the day.  -No surgical intervention at this time.  -Continue to hold AC and anti-platelet therapy.  -Repeat head CT 4/14.    Keep SBP < 140  -Continue statin    # New onset a-fib  -New onset a-fib  -EF on echo ~ 15%.  -Have to hold AC for now  -Cardiology following.    Will follow.

## 2022-04-14 LAB
ANION GAP SERPL CALC-SCNC: 6 MMOL/L — SIGNIFICANT CHANGE UP (ref 5–17)
BUN SERPL-MCNC: 18 MG/DL — SIGNIFICANT CHANGE UP (ref 7–23)
CALCIUM SERPL-MCNC: 8.9 MG/DL — SIGNIFICANT CHANGE UP (ref 8.5–10.1)
CHLORIDE SERPL-SCNC: 105 MMOL/L — SIGNIFICANT CHANGE UP (ref 96–108)
CO2 SERPL-SCNC: 24 MMOL/L — SIGNIFICANT CHANGE UP (ref 22–31)
CREAT SERPL-MCNC: 0.94 MG/DL — SIGNIFICANT CHANGE UP (ref 0.5–1.3)
EGFR: 91 ML/MIN/1.73M2 — SIGNIFICANT CHANGE UP
GLUCOSE SERPL-MCNC: 107 MG/DL — HIGH (ref 70–99)
HCT VFR BLD CALC: 45.1 % — SIGNIFICANT CHANGE UP (ref 39–50)
HGB BLD-MCNC: 16 G/DL — SIGNIFICANT CHANGE UP (ref 13–17)
MCHC RBC-ENTMCNC: 30.4 PG — SIGNIFICANT CHANGE UP (ref 27–34)
MCHC RBC-ENTMCNC: 35.5 GM/DL — SIGNIFICANT CHANGE UP (ref 32–36)
MCV RBC AUTO: 85.7 FL — SIGNIFICANT CHANGE UP (ref 80–100)
PLATELET # BLD AUTO: 180 K/UL — SIGNIFICANT CHANGE UP (ref 150–400)
POTASSIUM SERPL-MCNC: 3.7 MMOL/L — SIGNIFICANT CHANGE UP (ref 3.5–5.3)
POTASSIUM SERPL-SCNC: 3.7 MMOL/L — SIGNIFICANT CHANGE UP (ref 3.5–5.3)
RBC # BLD: 5.26 M/UL — SIGNIFICANT CHANGE UP (ref 4.2–5.8)
RBC # FLD: 13.1 % — SIGNIFICANT CHANGE UP (ref 10.3–14.5)
SODIUM SERPL-SCNC: 135 MMOL/L — SIGNIFICANT CHANGE UP (ref 135–145)
WBC # BLD: 13.9 K/UL — HIGH (ref 3.8–10.5)
WBC # FLD AUTO: 13.9 K/UL — HIGH (ref 3.8–10.5)

## 2022-04-14 PROCEDURE — 99232 SBSQ HOSP IP/OBS MODERATE 35: CPT

## 2022-04-14 PROCEDURE — 70450 CT HEAD/BRAIN W/O DYE: CPT | Mod: 26

## 2022-04-14 PROCEDURE — 99233 SBSQ HOSP IP/OBS HIGH 50: CPT

## 2022-04-14 RX ADMIN — Medication 20 MILLIGRAM(S): at 10:11

## 2022-04-14 RX ADMIN — ATORVASTATIN CALCIUM 80 MILLIGRAM(S): 80 TABLET, FILM COATED ORAL at 21:37

## 2022-04-14 RX ADMIN — Medication 50 MILLIGRAM(S): at 21:37

## 2022-04-14 RX ADMIN — CHLORHEXIDINE GLUCONATE 1 APPLICATION(S): 213 SOLUTION TOPICAL at 06:01

## 2022-04-14 RX ADMIN — Medication 50 MILLIGRAM(S): at 10:11

## 2022-04-14 NOTE — PROGRESS NOTE ADULT - SUBJECTIVE AND OBJECTIVE BOX
Cardiology Progress Note    HPI: 62 yr old M with no PMHX was brought to ED by EMS on 22 s/p fall for LE weakness.     Pt was diagnosed with hemorraghic conversion of acute CVA.  Pt c/o back pain secondary to bad position in bed but no other symptoms now.       PAST MEDICAL & SURGICAL HISTORY: None    FAMILY HISTORY: mother  at 87 from dementia; Father  at abdelrahman age of 87 from AFIB    Social Hx:  Nonsmoker, no drug or alcohol use (2022 12:00)    Allergies  No Known Allergies    MEDICATIONS  (STANDING):  aspirin enteric coated 81 milliGRAM(s) Oral daily  atorvastatin 80 milliGRAM(s) Oral at bedtime  diltiazem Infusion 5 mG/Hr (5 mL/Hr) IV Continuous <Continuous>  heparin   Injectable 5000 Unit(s) SubCutaneous every 12 hours  sodium chloride 0.9%. 1000 milliLiter(s) (50 mL/Hr) IV Continuous <Continuous>    MEDICATIONS  (PRN):    ICU Vital Signs Last 24 Hrs  T(C): 36.3 (2022 16:13), Max: 36.9 (2022 04:00)  T(F): 97.3 (2022 16:13), Max: 98.4 (2022 04:00)  HR: 93 (2022 16:13) (88 - 100)  BP: 129/92 (2022 16:13) (118/61 - 139/88)  BP(mean): --  ABP: --  ABP(mean): --  RR: 18 (2022 16:13) (18 - 19)  SpO2: 93% (2022 16:13) (92% - 98%)      REVIEW OF SYSTEMS:    CONSTITUTIONAL:  As per HPI.  HEENT:  Eyes:  No diplopia or blurred vision. ENT:  No earache, sore throat or runny nose.  CARDIOVASCULAR:  no CP or SOB  RESPIRATORY:  No cough, shortness of breath, PND or orthopnea.  GASTROINTESTINAL:  No nausea, vomiting or diarrhea.  MUSCULOSKELETAL:  As per HPI.  SKIN:  No change in skin, hair or nails.  NEUROLOGIC:  L sided weakness, lethargy    PHYSICAL EXAMINATION:    GENERAL APPEARANCE:  no acute distress   HEENT:  no JVD  HEART:   RRR, S1 and s 2 present   CHEST:  Chest is clear to auscultation. Normal respiratory effort.  ABDOMEN:  Soft and nontender.   EXTREMITIES:  There is no edema.     LABS:                                   16.0   13.90 )-----------( 180      ( 2022 08:05 )             45.1     04-14    135  |  105  |  18  ----------------------------<  107<H>  3.7   |  24  |  0.94    Ca    8.9      2022 08:05  Phos  3.7     04-13  Mg     2.0     04-13              04-12 Chol 149 LDL -- HDL 43 Trig 81       PTT - ( 2022 07:22 )  PTT:26.5 sec    Urinalysis Basic - ( 2022 14:42 )    Color: Yellow / Appearance: Slightly Turbid / S.005 / pH: x  Gluc: x / Ketone: Negative  / Bili: Negative / Urobili: Negative   Blood: x / Protein: Negative / Nitrite: Negative   Leuk Esterase: Negative / RBC: >50 /HPF / WBC 3-5   Sq Epi: x / Non Sq Epi: Occasional / Bacteria: Occasional    EKG: afib    TELEMETRY: Afib     CARDIAC TESTS: < from: TTE Echo Complete w/o Contrast w/ Doppler (22 @ 09:02) >  Moderate (2+) mitral regurgitation is present.   Normal aortic valve structure and function.   Moderate (2+) tricuspid valve regurgitation is present.   The left ventricle cavity is mildly dilated.   Estimated left ventricular ejection fraction is 15 %.   Severe, diffuse hypokinesis of the left ventricle is present.   The right ventricle exhibits mild diffuse hypokinesis, and mild   depression   of contractility.   Pleural effusion - is present.    RADIOLOGY & ADDITIONAL STUDIES: < from: CT Angio Neck w/ IV Cont (22 @ 07:42) >  IMPRESSION:  CT brain perfusion: Apparent increased mean transit time in the bilateral   temporal lobes, more on the right, and right frontal lobe, as wellas in   the bilateral cerebellum, may be artifactual. Consider further evaluation   with MRI.    CTA head and neck:  -Patent cervical and intracranial major arterial vasculature without   evidence of abrupt vessel cut off, hemodynamically significantstenosis,   aneurysm, or dissection.  -Bilateral pleural effusions.    < end of copied text >  < from: Xray Chest 1 View AP/PA. (22 @ 07:46) >  IMPRESSION: Mild to moderate CHF.    < from: MR Head No Cont (22 @ 09:44) >  IMPRESSION:   Acute infarction within the anterior RIGHT basal ganglia   and straightening restricted diffusion and central hemorrhage measuring   2.4 cm, with mass effect on the anterior horn of the RIGHT lateral   ventricle. Smaller acute infarctions involve the anterior inferior RIGHT   frontal lobe, the posterior RIGHT basal ganglia and posterior RIGHT   insular cortex as well as the posterior RIGHT temporal on lateral RIGHT   occipital cortex,  with restricted diffusion.    < end of copied text >      < from: TTE Echo Complete w/o Contrast w/ Doppler (22 @ 09:02) >     Summary     Moderate (2+) mitral regurgitation is present.   Normal aortic valve structure and function.   Moderate (2+) tricuspid valve regurgitation is present.   The left ventricle cavity is mildly dilated.   Estimated left ventricular ejection fraction is 15 %.   Severe, diffuse hypokinesis of the left ventricle is present.   The right ventricle exhibits mild diffuse hypokinesis, and mild   depression   of contractility.   Pleural effusion - is present.     Signature     ----------------------------------------------------------------   Electronically signed by Anders Marcelino MD(Interpreting   physician) on 2022 10:55 AM   ----------------------------------------------------------------    < end of copied text >

## 2022-04-14 NOTE — PROGRESS NOTE ADULT - SUBJECTIVE AND OBJECTIVE BOX
HPI:  Patient is a 62 yr old male with no PMHX was brought to ED by EMS on 4/11/22 s/p fall for LE weakness. Patient states at 5:30 am this morning he got up to turn off the TV when his leg gave way and he fell. Pt's sister lives downstairs and heard the fall; she went upstairs and helped him up to a chair, but then pt got up and fell again. Pt's sister was concerned he was having a stroke so she called EMS.     In ED the patient was evaluated, and code stroke was called. CTH showed no acute infarct or hemorrhage. CTA head and neck showed no LVO or stenosis or aneurysms. CT perfusion showed no core infarct. IV TPA was not given because LKN was last night, NIHSS 3. Pt was given 2 baby ASA. He was found to have new onset Afib in ED, and upon my evaluation BP was elevated at 156/118.    At bedside pt is awake and alert, is aware that he fell earlier this morning. He denies any Chest pain , numbness, tingling, headache, visual changes, dizziness or vertigo. Patient does have a hx of speech impediment but noticed he had some difficulty speaking this morning; Denies any dysphagia;  Pt's sister states that he has not seen a medical doctor in years, does not take any medications; Patient sister Nancy was at bedsided who he would like to make decisions for him [phone]    Neurosurgery called this morning for noted hemorrhage on MRI brain, acute infarct right basal ganglia. Patient seen and examined on [address] [place]. Patient drowsy, awakens for exam. He denies headache, n/v, numb.tingling, new weakness.    4/13- Patient seen and examined. No acute events overnight. Head CT yesterday stable. Patient drowsy awakens for exam, following commands minimally verbal. Denies headache, n/v, numb/tingling, weakness.    4/14- pt seen and examined on 3[facility], no events overnight, pt seems frustrated and uncomfortable in bed, continued left arm dysmetria and weakness, states his left leg feel heavy, denies headache, dizziness, nausea or vomiting     Vital Signs Last 24 Hrs  T(C): 36.6 (14 Apr 2022 08:21), Max: 36.9 (14 Apr 2022 04:00)  T(F): 97.9 (14 Apr 2022 08:21), Max: 98.4 (14 Apr 2022 04:00)  HR: 99 (14 Apr 2022 08:21) (88 - 100)  BP: 119/91 (14 Apr 2022 08:21) (118/68 - 139/88)  BP(mean): 79 (13 Apr 2022 16:00) (79 - 79)  RR: 18 (14 Apr 2022 08:21) (18 - 29)  SpO2: 92% (14 Apr 2022 08:21) (92% - 97%)    MEDICATIONS  (STANDING):  atorvastatin 80 milliGRAM(s) Oral at bedtime  chlorhexidine 4% Liquid 1 Application(s) Topical <User Schedule>  furosemide   Injectable 20 milliGRAM(s) IV Push daily  metoprolol tartrate 50 milliGRAM(s) Oral two times a day    ROS: pertinent positives in HPI, all other ROS were reviewed and are negative     PHYSICAL EXAM:  Constitutional: awake and alert  HEENT: PERRLA, EOMI, moist membranes   Neck: Supple, FROM  Respiratory: Breath sounds are clear bilaterally  Cardiovascular: S1 and S2, irregular rhythm  Gastrointestinal: soft, nontender  Extremities:  no edema  Musculoskeletal: no joint swelling/tenderness, no abnormal movements  Skin: No rashes  Psych: flattened affect     Neurological exam:  HF: A x O x 3. Mild dysarthria. No Aphasia or paraphasic errors. Naming /repetition intact   CN: PEARRL, EOMI, VFF, facial sensation normal, left NLFD, tongue midline, Palate moves equally, SCM equal bilaterally  Motor: Left pronator drift, Strength 5/5 except LUE 4/5, LLE 4-/5, normal bulk and tone, no tremor, rigidity or bradykinesia.    Sens: Intact to light touch  Reflexes: Symmetric and normal, downgoing toes b/l  Coord:  Left FTN dysmetria, SHEELA intact   Gait/Balance: Cannot test      LABS:                         16.0   13.90 )-----------( 180      ( 14 Apr 2022 08:05 )             45.1     04-14    135  |  105  |  18  ----------------------------<  107<H>  3.7   |  24  |  0.94    Ca    8.9      14 Apr 2022 08:05  Phos  3.7     04-13  Mg     2.0     04-13    04-12 Chol 149 LDL -- HDL 43 Trig 81    RADIOLOGY:  < from: CT Head No Cont (04.14.22 @ 08:19) >  Slightly increased conspicuity of hemorrhagic transformation of right   basal ganglia acute infarction. Similar regional mass effect.  Similar-appearing acute infarction involving the right inferolateral   frontal lobe and right mid corona radiata.  No midline shift. No hydrocephalus.

## 2022-04-14 NOTE — PROGRESS NOTE ADULT - ASSESSMENT
A/P: 62 yr old M with no PMHX was brought to ED by EMS on 4/11/22 s/p fall for LE weakness.     1. Atrial fibrillation. poorly rate controlled.  Pt not on AC secondary to hemorrhagic transformation.   Can increase metoprolol to three times a day.  Give evening dose early.     2. CHF. Severely reduced LVEF of 15%- unknown etiology. Will hold off on ACEI for now and will increase betablocker to better rate control.     3. +Troponins. Likely demand in setting of CVA, afib, CHF.   Medical mgmt as above.   Only one set positive and later sets negative.     4. DVT proph.    Other medical issues- Management per primary team.   Thank you for allowing me to participate in the care of this patient. Please feel free to contact me with any questions.

## 2022-04-14 NOTE — PROGRESS NOTE ADULT - ASSESSMENT
62 yr old M with no PMHX was brought to ED by EMS on 4/11/22 s/p fall for LE weakness. Patient states at 5:30 am this morning he got up to turn off the TV when his leg gave way and he fell. Pt's sister lives downstairs and heard the fall; she went upstairs and helped him up to a chair, but then pt got up and fell again. Pt's sister was concerned he was having a stroke so she called EMS.     In ED the patient was evaluated, and code stroke was called. CTH showed no acute infarct or hemorrhage. CTA head and neck showed no LVO or stenosis or aneurysms. CT perfusion showed no core infarct. IV TPA was not given because LKN was last night, NIHSS 4-5. Pt was given 2 baby ASA. He was found to have new onset Afib in ED, and upon my evaluation his BP was elevated at 156/118.    #Acute right CVA, with hemorrhagic transformation. ? embolic d/t new onset Afib. Patient also with uncontrolled HTN    -Repeat Head CT today shows more visible hemorrhagic transformation, but mass effect is similar and pt remains clinically stable  -No surgical intervention at this time.  -Continue to hold AC and anti-platelet therapy.  -Neurosurgery is following  -Keep SBP < 140  -Continue statin    # New onset a-fib    -EF on echo ~ 15%.  -Have to hold AC for now  -Cardiology is following.    Will follow. Patient was discussed with Dr. Dee

## 2022-04-14 NOTE — PROGRESS NOTE ADULT - SUBJECTIVE AND OBJECTIVE BOX
Interval History:  22: Pt denies HA, dizziness, continues to feels left sided weakness.    Repeat CTH shows slightly increased conspicuity of hemorrhagic transformation of right   basal ganglia acute infarction, no midline shift.    MEDICATIONS  (STANDING):  atorvastatin 80 milliGRAM(s) Oral at bedtime  chlorhexidine 4% Liquid 1 Application(s) Topical <User Schedule>  furosemide   Injectable 20 milliGRAM(s) IV Push daily  metoprolol tartrate 50 milliGRAM(s) Oral two times a day    MEDICATIONS  (PRN):  hydrALAZINE Injectable 5 milliGRAM(s) IV Push every 6 hours PRN SBP >150    Allergies: No Known Allergies    PHYSICAL EXAM:  Vital Signs Last 24 Hrs  T(F): 98.5 (22 @ 08:00)  HR: 93 (22 @ 10:00)  BP: 142/102 (22 @ 10:00)  RR: 12 (22 @ 10:00)    GENERAL: NAD, well-groomed  HEAD:  Atraumatic, Normocephalic  Neuro:  Awake, alert, no aphasia  CN: PERRL, EOMI, no nystagmus, slight flattening of left nasolabial fold, mild dysarthria, tongue protrudes in the midline  motor: + left pronator drift, 4/5 in LUE, 4-/5 in LLE, Left leg drift, 5/5 in RUE and RLE  sensory: intact to light touch  coordination: +L FTFA  DTRs: symmetric, plantar responses flexor bilaterally    NIHSS: 5    LABS:                        14.5   14.81 )-----------( 170      ( 2022 05:42 )             42.3     04-13    136  |  106  |  15  ----------------------------<  104<H>  3.7   |  23  |  0.91    Ca    8.5      2022 05:42  Phos  3.7     04-13  Mg     2.0     04-13      PT/INR - ( 2022 11:26 )   PT: 13.9 sec;   INR: 1.20 ratio      Urinalysis Basic - ( 2022 14:42 )    Color: Yellow / Appearance: Slightly Turbid / S.005 / pH: x  Gluc: x / Ketone: Negative  / Bili: Negative / Urobili: Negative   Blood: x / Protein: Negative / Nitrite: Negative   Leuk Esterase: Negative / RBC: >50 /HPF / WBC 3-5   Sq Epi: x / Non Sq Epi: Occasional / Bacteria: Occasional    RADIOLOGY & ADDITIONAL STUDIES:  CT Head No Cont (22 @ 08:19) >    IMPRESSION:    Slightly increased conspicuity of hemorrhagic transformation of right   basal ganglia acute infarction. Similar regional mass effect.    Similar-appearing acute infarction involving the right inferolateral   frontal lobe and right mid corona radiata.    No midline shift. No hydrocephalus.    < end of copied text >    CT Angio Neck w/ IV Cont (22 @ 07:42) >    IMPRESSION:  CT brain perfusion: Apparent increased mean transit time in the bilateral   temporal lobes, more on the right, and right frontal lobe, as well as in   the bilateral cerebellum, may be artifactual. Consider further evaluation   with MRI.    CTA head and neck:    -Moderate atherosclerotic calcifications along the bilateral carotid   bifurcations without significant stenosis. The internal carotid arteries   are patent without significant stenosis. Dense atherosclerotic   calcifications along the bilateral cavernous segments without significant   stenosis.  -Patent cervical and intracranial major arterial vasculature without   evidence of abrupt vessel cut off, hemodynamically significant stenosis,   aneurysm, or dissection.  -Bilateral pleural effusions.    CT Brain Stroke Protocol (22 @ 07:31) >    IMPRESSION:  No acute intracranial hemorrhage or mass effect.    MRI head 22:  Acute infarction within the anterior RIGHT basal ganglia   and straightening restricted diffusion and central hemorrhage measuring   2.4 cm, with mass effect on the anterior horn of the RIGHT lateral   ventricle. Smaller acute infarctions involve the anterior inferior RIGHT   frontal lobe, the posterior RIGHT basal ganglia and posterior RIGHT   insular cortex as well as the posterior RIGHT temporal on lateral RIGHT   occipital cortex,  with restricted diffusion.    Echo 22:     Moderate (2+) mitral regurgitation is present.   Normal aortic valve structure and function.   Moderate (2+) tricuspid valve regurgitation is present.   The left ventricle cavity is mildly dilated.   Estimated left ventricular ejection fraction is 15 %.   Severe, diffuse hypokinesis of the left ventricle is present.   The right ventricle exhibits mild diffuse hypokinesis, and mild   depression   of contractility.   Pleural effusion - is present.       Interval History:  22: Pt denies HA, dizziness, continues to feels left sided weakness.    Repeat CTH shows slightly increased conspicuity of hemorrhagic transformation of right   basal ganglia acute infarction, no midline shift.    MEDICATIONS  (STANDING):  atorvastatin 80 milliGRAM(s) Oral at bedtime  chlorhexidine 4% Liquid 1 Application(s) Topical <User Schedule>  furosemide   Injectable 20 milliGRAM(s) IV Push daily  metoprolol tartrate 50 milliGRAM(s) Oral two times a day    MEDICATIONS  (PRN):  hydrALAZINE Injectable 5 milliGRAM(s) IV Push every 6 hours PRN SBP >150    Allergies: No Known Allergies    PHYSICAL EXAM:  Vital Signs Last 24 Hrs  T(F): 98.5 (22 @ 08:00)  HR: 93 (22 @ 10:00)  BP: 142/102 (22 @ 10:00)  RR: 12 (22 @ 10:00)    GENERAL: NAD, well-groomed  HEAD:  Atraumatic, Normocephalic  Neuro:  Awake, alert, no aphasia  CN: PERRL, EOMI, no nystagmus, slight flattening of left nasolabial fold, mild dysarthria, tongue protrudes in the midline  motor: + left pronator drift, 4/5 in LUE, 4-/5 in LLE, Left leg drift, 5/5 in RUE and RLE  sensory: intact to light touch  coordination: +L FTFA  DTRs: symmetric, plantar responses flexor bilaterally    NIHSS: 5    LABS:                        14.5   14.81 )-----------( 170      ( 2022 05:42 )             42.3     04-13    136  |  106  |  15  ----------------------------<  104<H>  3.7   |  23  |  0.91    Ca    8.5      2022 05:42  Phos  3.7     04-13  Mg     2.0     04-13      PT/INR - ( 2022 11:26 )   PT: 13.9 sec;   INR: 1.20 ratio      Urinalysis Basic - ( 2022 14:42 )    Color: Yellow / Appearance: Slightly Turbid / S.005 / pH: x  Gluc: x / Ketone: Negative  / Bili: Negative / Urobili: Negative   Blood: x / Protein: Negative / Nitrite: Negative   Leuk Esterase: Negative / RBC: >50 /HPF / WBC 3-5   Sq Epi: x / Non Sq Epi: Occasional / Bacteria: Occasional    RADIOLOGY & ADDITIONAL STUDIES:  CT Head No Cont (22 @ 08:19) >    IMPRESSION:    Slightly increased conspicuity of hemorrhagic transformation of right   basal ganglia acute infarction. Similar regional mass effect.    Similar-appearing acute infarction involving the right inferolateral   frontal lobe and right mid corona radiata.    No midline shift. No hydrocephalus.    < end of copied text >    CT Angio Neck w/ IV Cont (22 @ 07:42) >    IMPRESSION:  CT brain perfusion: Apparent increased mean transit time in the bilateral   temporal lobes, more on the right, and right frontal lobe, as well as in   the bilateral cerebellum, may be artifactual. Consider further evaluation   with MRI.    CTA head and neck:    -Moderate atherosclerotic calcifications along the bilateral carotid   bifurcations without significant stenosis. The internal carotid arteries   are patent without significant stenosis. Dense atherosclerotic   calcifications along the bilateral cavernous segments without significant   stenosis.  -Patent cervical and intracranial major arterial vasculature without   evidence of abrupt vessel cut off, hemodynamically significant stenosis,   aneurysm, or dissection.  -Bilateral pleural effusions.    CT Brain Stroke Protocol (22 @ 07:31) >    IMPRESSION:  No acute intracranial hemorrhage or mass effect.    MRI head 22:  Acute infarction within the anterior RIGHT basal ganglia   and straightening restricted diffusion and central hemorrhage measuring   2.4 cm, with mass effect on the anterior horn of the RIGHT lateral   ventricle. Smaller acute infarctions involve the anterior inferior RIGHT   frontal lobe, the posterior RIGHT basal ganglia and posterior RIGHT   insular cortex as well as the posterior RIGHT temporal on lateral RIGHT   occipital cortex,  with restricted diffusion.    Echo 22:     Moderate (2+) mitral regurgitation is present.   Normal aortic valve structure and function.   Moderate (2+) tricuspid valve regurgitation is present.   The left ventricle cavity is mildly dilated.   Estimated left ventricular ejection fraction is 15 %.   Severe, diffuse hypokinesis of the left ventricle is present.   The right ventricle exhibits mild diffuse hypokinesis, and mild   depression   of contractility.   Pleural effusion - is present.      CT head 22:  Slightly increased conspicuity of hemorrhagic transformation of right   basal ganglia acute infarction. Similar regional mass effect.    Similar-appearing acute infarction involving the right inferolateral   frontal lobe and right mid corona radiata.    No midline shift. No hydrocephalus.

## 2022-04-14 NOTE — PROGRESS NOTE ADULT - NS ATTEND AMEND GEN_ALL_CORE FT
Slightly increased conspicuity of hemorrhagic portion of stroke.  Hold off on AC for now.  Can repeat head CT in 2 days. If stable can start aspirin at that time.    Will f/u as needed.

## 2022-04-14 NOTE — PROGRESS NOTE ADULT - ASSESSMENT
62 yr old M with no PMHX was brought to ED by EMS on 4/11/22 s/p fall for LE weakness. Patient states at 5:30 am this morning he got up to turn off the TV when his leg gave way and he fell. Pt's sister lives downstairs and heard the fall; she went upstairs and helped him up to a chair, but then pt got up and fell again. Pt's sister was concerned he was having a stroke so she called EMS. CTH showed no acute infarct or hemorrhage. CTA head and neck showed no LVO or stenosis or aneurysms. CT perfusion showed no core infarct. IV TPA was not given because LKN was last night, NIHSS 4-5. Pt was given 2 baby ASA. He was found to have new onset Afib in ED, and upon my evaluation his BP was elevated at 156/118.  MRI Brain this morning revealed Acute right CVA, with hemorrhagic conversion    #Acute stroke with hemorrhagic conversion, relatively stable on repeat CT head   #mass effect from intracerebral hemorrhage   #New-onset A-fib      Plan:  - No acute neurosurgical intervention  - maintain SBP <150  - Advance diet/activity as tolerated  - Would weight cardiac risk stratification for starting anticoagulation in setting of acute hemorrhage  - Would defer to neurology / cardiology regarding AC use  - If acute change in mental status order stat HCT  - Continue care as per medical team    Will sign off for now, please re-consult as needed, or if there is an acute change in pts exam     Discussed with Dr. Burkett

## 2022-04-15 LAB
APPEARANCE UR: CLEAR — SIGNIFICANT CHANGE UP
BILIRUB UR-MCNC: NEGATIVE — SIGNIFICANT CHANGE UP
COLOR SPEC: YELLOW — SIGNIFICANT CHANGE UP
DIFF PNL FLD: NEGATIVE — SIGNIFICANT CHANGE UP
GLUCOSE UR QL: NEGATIVE — SIGNIFICANT CHANGE UP
KETONES UR-MCNC: ABNORMAL
LEUKOCYTE ESTERASE UR-ACNC: NEGATIVE — SIGNIFICANT CHANGE UP
NITRITE UR-MCNC: NEGATIVE — SIGNIFICANT CHANGE UP
PH UR: 6 — SIGNIFICANT CHANGE UP (ref 5–8)
PROT UR-MCNC: NEGATIVE — SIGNIFICANT CHANGE UP
SP GR SPEC: 1.02 — SIGNIFICANT CHANGE UP (ref 1.01–1.02)
UROBILINOGEN FLD QL: NEGATIVE — SIGNIFICANT CHANGE UP

## 2022-04-15 PROCEDURE — 99221 1ST HOSP IP/OBS SF/LOW 40: CPT

## 2022-04-15 PROCEDURE — 99232 SBSQ HOSP IP/OBS MODERATE 35: CPT

## 2022-04-15 PROCEDURE — 99233 SBSQ HOSP IP/OBS HIGH 50: CPT

## 2022-04-15 PROCEDURE — 71045 X-RAY EXAM CHEST 1 VIEW: CPT | Mod: 26

## 2022-04-15 PROCEDURE — 99497 ADVNCD CARE PLAN 30 MIN: CPT | Mod: 25

## 2022-04-15 RX ADMIN — Medication 20 MILLIGRAM(S): at 09:31

## 2022-04-15 RX ADMIN — ATORVASTATIN CALCIUM 80 MILLIGRAM(S): 80 TABLET, FILM COATED ORAL at 21:31

## 2022-04-15 RX ADMIN — Medication 50 MILLIGRAM(S): at 09:31

## 2022-04-15 RX ADMIN — Medication 50 MILLIGRAM(S): at 21:31

## 2022-04-15 RX ADMIN — CHLORHEXIDINE GLUCONATE 1 APPLICATION(S): 213 SOLUTION TOPICAL at 06:05

## 2022-04-15 NOTE — DIETITIAN INITIAL EVALUATION ADULT - NSFNSGIIOFT_GEN_A_CORE
I&O's Detail    14 Apr 2022 07:01  -  15 Apr 2022 07:00  --------------------------------------------------------  IN:  Total IN: 0 mL    OUT:    Voided (mL): 300 mL  Total OUT: 300 mL    Total NET: -300 mL

## 2022-04-15 NOTE — CONSULT NOTE ADULT - CONVERSATION DETAILS
We discussed Palliative Care team being a supportive team when a patient has ongoing illnesses.  We also discussed that it is not an end of life care service, but can help navigate symptoms and emotional support throughout their hospital stay here.     Hospice was explained as well as an end of life care philosophy. When a disease cannot be cured, or family/patient decide the treatment burdens out weight the risk and chose to change focus of treatment from cure to quality/comfort.       HCP discussed, pt states he wants his sister to be his HCP and will fill out the paper before he leaves.       We discussed at length patients underlying clinical diagnosis at length.  We discussed resuscitation and risk and benefits of CPR. Risks include, broken ribs, lung puncture, pain and discomfort.    They understand that DNR means NO CPR and that would allow natural death.  No CPR means no attempt to restart the heart once it has stopped.  Patient verbalized understanding.     We also discussed mechanical ventilation in  an event that they could no longer breath on their own.  Risk and benefits of mechanical ventilation were discussed at length.  Patient was able to verbalized understanding.       At this time he wants to continue current treatment management

## 2022-04-15 NOTE — CONSULT NOTE ADULT - SUBJECTIVE AND OBJECTIVE BOX
HPI:   63 y/o/m w/ PMHx, admitted in the ED 22 s/p fall for LE weakness. Patient states at 5:30 am this morning he got up to turn off the TV when his leg gave way and he fell. Pt's sister lives downstairs and heard the fall; she went upstairs and helped him up to a chair, but then pt got up and fell again. Pt's sister was concerned he was having a stroke so she called EMS. s/p code stroke in the ED.      In ED the patient was evaluated, and code stroke was called. CTH showed no acute infarct or hemorrhage. CTA head and neck showed no LVO or stenosis or aneurysms. CT perfusion showed no core infarct. IV TPA was not given because LKN was last night, NIHSS 4-5. Pt was given 2 baby ASA. He was found to have new onset Afib in ED, and upon my evaluation his BP was elevated at 156/118.    4/15/22  pt seen and examined  a+Ox3 in NAD has no pain nausea or vomitting  sister at bedside - Nancy.   He states she is his HCP and will fill out paperwork here to be sure they have it.          PAIN: ( )Yes   ( x)No   DYSPNEA: ( ) Yes  ( x) No  Level:    PAST MEDICAL & SURGICAL HISTORY:      SOCIAL HX:    Hx opiate tolerance ( )YES  ( x)NO    Baseline ADLs  (Prior to Admission)  (x ) Independent   ( )Dependent    FAMILY HISTORY:      Review of Systems:     Anxiety- denies  Depression-denies  Physical Discomfort- in NAD  Dyspnea-denies  Constipation-denies  Diarrhea-denies  Nausea-denies  Vomiting-denies  Anorexia-denies  Weight Loss- denies  Cough-denies  Secretions-denies  Fatigue-denies  Weakness-denies  Delirium-denies    All other systems reviewed and negative       PHYSICAL EXAM:    Vital Signs Last 24 Hrs  T(C): 36.6 (15 Apr 2022 09:08), Max: 36.9 (2022 21:24)  T(F): 97.9 (15 Apr 2022 09:08), Max: 98.5 (2022 21:24)  HR: 98 (15 Apr 2022 09:08) (98 - 111)  BP: 102/78 (15 Apr 2022 09:08) (102/78 - 132/78)  BP(mean): 88 (15 Apr 2022 04:00) (88 - 88)  RR: 18 (15 Apr 2022 09:08) (18 - 19)  SpO2: 95% (15 Apr 2022 09:08) (95% - 98%)  Daily     Daily Weight in k.9 (15 Apr 2022 05:30)    PPSV2:45   %  FAST:    General: calm in NAD  Mental Status: awake alert oriented x3  HEENT: eomi, perrl  Lungs: ctabl b/l bs  Cardiac: s1s2 no mgr  GI: soft nontender +BS  : voids  Ext: weakness on left upper and lower extremities   Neuro: no gross findings     LABS:                        16.0   13.90 )-----------( 180      ( 2022 08:05 )             45.1         135  |  105  |  18  ----------------------------<  107<H>  3.7   |  24  |  0.94    Ca    8.9      2022 08:05        Albumin: Albumin, Serum: 3.6 g/dL ( @ 07:22)      Allergies    No Known Allergies    Intolerances      MEDICATIONS  (STANDING):  atorvastatin 80 milliGRAM(s) Oral at bedtime  chlorhexidine 4% Liquid 1 Application(s) Topical <User Schedule>  furosemide   Injectable 20 milliGRAM(s) IV Push daily  metoprolol tartrate 50 milliGRAM(s) Oral two times a day    MEDICATIONS  (PRN):      RADIOLOGY/ADDITIONAL STUDIES:

## 2022-04-15 NOTE — DIETITIAN NUTRITION RISK NOTIFICATION - TREATMENT: THE FOLLOWING DIET HAS BEEN RECOMMENDED
Diet, Regular:   DASH/TLC {Sodium & Cholesterol Restricted} (DASH)  1500mL Fluid Restriction ([id number]) (04-12-22 @ 13:23) [Active]

## 2022-04-15 NOTE — DIETITIAN INITIAL EVALUATION ADULT - ADD RECOMMEND
1) continue with current diet and encourage protein w/all meals 2) gelatein plus jello po BID 3) Maintain aspiration precautions, back of bed >35 degrees. 4) monitor weights track trends 5) MVI w/minerals to meet RDI's 6) monitor BM if none x > 3 days initiate bowel regimen 7) monitor lytes and hydration replete prn

## 2022-04-15 NOTE — DIETITIAN NUTRITION RISK NOTIFICATION - ADDITIONAL COMMENTS/DIETITIAN RECOMMENDATIONS
Additional Recommendations  1) continue with current diet and encourage protein w/all meals 2) gelatein plus jello po BID 3) Maintain aspiration precautions, back of bed >35 degrees. 4) monitor weights track trends 5) MVI w/minerals to meet RDI's 6) monitor BM if none x > 3 days initiate bowel regimen 7) monitor lytes and hydration replete prn

## 2022-04-15 NOTE — PROGRESS NOTE ADULT - ASSESSMENT
A/P: 62 yr old M with no PMHX was brought to ED by EMS on 4/11/22 s/p fall for LE weakness.     1. Atrial fibrillation.  rate controlled. HR is 80s this AM   Pt not on AC secondary to hemorrhagic transformation.   cont metoprolol      2. CHF. Severely reduced LVEF of 15%- unknown etiology. Will hold off on ACEI for now and will increase betablocker to better rate control. MAy eventually need a cath or perfusion study with viability when clinical status improves , probably as oupt     3. +Troponins. Likely demand in setting of CVA, afib, CHF.   Medical mgmt as above.   Only one set positive and later sets negative.     4. DVT proph.    Other medical issues- Management per primary team.   Thank you for allowing me to participate in the care of this patient. Please feel free to contact me with any questions.

## 2022-04-15 NOTE — PROGRESS NOTE ADULT - TIME BILLING
61 y/o male (haven't seen a physician or had wellness check in decades) initially presented after a fall due to LLE weakness, found to have R basal ganglia CVA, NIHSS 3, not a tPA candidate, new onset A.fib and newly diagnosed systolic heart failure    Subsequently found to have hemorrhage in the infarct area and A.fib with RVR, transferred to ICU 4/12 for q1 neurochecks     He had received aspirin 162 mg on admission but no therapeutic AC       Plan:    CT head and neuro exam stable   No neurosurgical intervention   Change neurochecks to q4  PT eval   Continue holding AC, AP     A.fib with HR 90s  Off dilt drip per cardiology given systolic dysfn   Continue BB, statin, lasix   Add ACEI if BP allows   Defer AC to cardiology, neurosx   He will need ischemic eval when stable     Resp status is stable on RA     Tolerating po diet       Stable for tele, d/w neurosurgery       DVT ppx: SCDs  Nutrition: po  Central line: no  Arterial line: no  Oropeza: no  Restraints: no  Activity: oob    Code status: full     Disposition: stable for tele, sign out given to hospitalist at 16.30    Updated: sister at bedside - told her about new A.fib and heart failure diagnoses and that he will need more work up for his heart in the future, she will have him continue to follow with Dr. Raza as outpatient and find a PCP for him

## 2022-04-15 NOTE — PROGRESS NOTE ADULT - SUBJECTIVE AND OBJECTIVE BOX
Patient is a 63 y/o/m w/ PMHx, admitted in the ED 22 s/p fall for LE weakness. Patient states at 5:30 am this morning he got up to turn off the TV when his leg gave way and he fell. Pt's sister lives downstairs and heard the fall; she went upstairs and helped him up to a chair, but then pt got up and fell again. Pt's sister was concerned he was having a stroke so she called EMS. s/p code stroke in the ED.      In ED the patient was evaluated, and code stroke was called. CTH showed no acute infarct or hemorrhage. CTA head and neck showed no LVO or stenosis or aneurysms. CT perfusion showed no core infarct. IV TPA was not given because LKN was last night, NIHSS 4-5. Pt was given 2 baby ASA. He was found to have new onset Afib in ED, and upon my evaluation his BP was elevated at 156/118.    REVIEW OF SYSTEMS:  General: NAD, hemodynamically stable, (-)  fever, (-) chills, (-) weakness  HEENT:  Eyes:  No visual loss, blurred vision, double vision or yellow sclerae. Ears, Nose, Throat:  No hearing loss, sneezing, congestion, runny nose or sore throat.  SKIN:  No rash or itching.  CARDIOVASCULAR:  No chest pain, chest pressure or chest discomfort. No palpitations or edema.  RESPIRATORY:  No shortness of breath, cough or sputum.  GASTROINTESTINAL:  No anorexia, nausea, vomiting or diarrhea. No abdominal pain or blood.  NEUROLOGICAL:  No headache, dizziness, syncope, paralysis, ataxia, numbness or tingling in the extremities. No change in bowel or bladder control.  MUSCULOSKELETAL:  No muscle, back pain, joint pain or stiffness.  HEMATOLOGIC:  No anemia, bleeding or bruising.  LYMPHATICS:  No enlarged nodes. No history of splenectomy.  ENDOCRINOLOGIC:  No reports of sweating, cold or heat intolerance. No polyuria or polydipsia.  ALLERGIES:  No history of asthma, hives, eczema or rhinitis.    Physical Exam:   GENERAL APPEARANCE:  NAD, hemodynamically stable  T(C): 36.6 (04-15-22 @ 09:08), Max: 36.9 (22 @ 21:24)  HR: 98 (04-15-22 @ 09:08) (90 - 111)  BP: 102/78 (04-15-22 @ 09:08) (102/78 - 132/78)  RR: 18 (04-15-22 @ 09:08) (18 - 19)  SpO2: 95% (04-15-22 @ 09:08) (93% - 98%)  Wt(kg): --  HEENT:  Head is normocephalic    Skin:  Warm and dry without any rash   NECK:  Supple without lymphadenopathy.   HEART:  Regular rate and rhythm. normal S1 and S2, No M/R/G  LUNGS:  Good ins/exp effort, no W/R/R/C  ABDOMEN:  Soft, nontender, nondistended with good bowel sounds heard  EXTREMITIES:  Without cyanosis, clubbing or edema.   NEUROLOGICAL:  Gross nonfocal       CBC Full  -  ( 2022 08:05 )  WBC Count : 13.90 K/uL  RBC Count : 5.26 M/uL  Hemoglobin : 16.0 g/dL  Hematocrit : 45.1 %  Platelet Count - Automated : 180 K/uL  Mean Cell Volume : 85.7 fl  Mean Cell Hemoglobin : 30.4 pg  Mean Cell Hemoglobin Concentration : 35.5 gm/dL  Auto Neutrophil # : x  Auto Lymphocyte # : x  Auto Monocyte # : x  Auto Eosinophil # : x  Auto Basophil # : x  Auto Neutrophil % : x  Auto Lymphocyte % : x  Auto Monocyte % : x  Auto Eosinophil % : x  Auto Basophil % : x      Urinalysis Basic - ( 15 Apr 2022 10:45 )    Color: Yellow / Appearance: Clear / S.020 / pH: x  Gluc: x / Ketone: Trace  / Bili: Negative / Urobili: Negative   Blood: x / Protein: Negative / Nitrite: Negative   Leuk Esterase: Negative / RBC: x / WBC x   Sq Epi: x / Non Sq Epi: x / Bacteria: x          135  |  105  |  18  ----------------------------<  107<H>  3.7   |  24  |  0.94    Ca    8.9      2022 08:05        #Acute right CVA, with hemorrhagic transformation. ? embolic d/t new onset Afib. Patient also with uncontrolled HTN    -Repeat Head CT on  shows more visible hemorrhagic transformation, but mass effect is similar and pt remains clinically stable  -No surgical intervention at this time.  -Continue to hold AC and anti-platelet therapy.  -Repeat head CT on .   -Neurosurgery is following  -Keep SBP < 140  -Continue statin    # New onset a-fib    -EF on echo ~ 15%.  -Have to hold AC for now  -Cardiology is following.   Patient is a 63 y/o/m w/ PMHx, admitted in the ED 22 s/p fall for LE weakness. Patient states at 5:30 am this morning he got up to turn off the TV when his leg gave way and he fell. Pt's sister lives downstairs and heard the fall; she went upstairs and helped him up to a chair, but then pt got up and fell again. Pt's sister was concerned he was having a stroke so she called EMS. s/p code stroke in the ED.      In ED the patient was evaluated, and code stroke was called. CTH showed no acute infarct or hemorrhage. CTA head and neck showed no LVO or stenosis or aneurysms. CT perfusion showed no core infarct. IV TPA was not given because LKN was last night, NIHSS 4-5. Pt was given 2 baby ASA. He was found to have new onset Afib in ED, and upon my evaluation his BP was elevated at 156/118.    REVIEW OF SYSTEMS:  Poor historian    Physical Exam:   GENERAL APPEARANCE:  NAD, hemodynamically stable  T(C): 36.6 (04-15-22 @ 09:08), Max: 36.9 (22 @ 21:24)  HR: 98 (04-15-22 @ 09:08) (90 - 111)  BP: 102/78 (04-15-22 @ 09:08) (102/78 - 132/78)  RR: 18 (04-15-22 @ 09:08) (18 - 19)  SpO2: 95% (04-15-22 @ 09:08) (93% - 98%)  Wt(kg): --  HEENT:  Head is normocephalic    Skin:  Warm and dry without any rash   NECK:  Supple without lymphadenopathy.   HEART:  Regular rate and rhythm. normal S1 and S2, No M/R/G  LUNGS:  Good ins/exp effort, no W/R/R/C  ABDOMEN:  Soft, nontender, nondistended with good bowel sounds heard  EXTREMITIES:  Without cyanosis, clubbing or edema.   NEUROLOGICAL:  Gross nonfocal       CBC Full  -  ( 2022 08:05 )  WBC Count : 13.90 K/uL  RBC Count : 5.26 M/uL  Hemoglobin : 16.0 g/dL  Hematocrit : 45.1 %  Platelet Count - Automated : 180 K/uL      Urinalysis Basic - ( 15 Apr 2022 10:45 )    Color: Yellow / Appearance: Clear / S.020 / pH: x  Gluc: x / Ketone: Trace  / Bili: Negative / Urobili: Negative   Blood: x / Protein: Negative / Nitrite: Negative   Leuk Esterase: Negative / RBC: x / WBC x   Sq Epi: x / Non Sq Epi: x / Bacteria: x          135  |  105  |  18  ----------------------------<  107<H>  3.7   |  24  |  0.94    Ca    8.9      2022 08:05        # Acute right CVA, with hemorrhagic transformation. ? embolic d/t new onset Afib. Patient also with uncontrolled HTN  - Repeat Head CT on  shows more visible hemorrhagic transformation   - Pt not on AC secondary to hemorrhagic transformation.   - No surgical intervention at this time.  - Continue to hold AC and anti-platelet therapy.  - Repeat head CT on   - Keep SBP < 140  - Continue statin    # New onset a-fib  - EF on echo - 15%.  - Have to hold AC for now  - Cardiology is following.    # CHF. Severely reduced LVEF of 15%- unknown etiology. Will hold off on ACEI for now and will increase betablocker to better rate control  - eventually need a cath or perfusion study with viability    - patient's cardiomyopathy secondary to     # (+) Troponin Likely demand in setting of CVA, afib   - Medical mgmt as above.   - Only one set positive and later sets negative.     Patient has an advanced medical history with low EF /  CVA with hemorrhagic transformation - palliative care eval     Patient is a 61 y/o/m w/ PMHx, admitted in the ED 22 s/p fall for LE weakness. Patient states at 5:30 am this morning he got up to turn off the TV when his leg gave way and he fell. Pt's sister lives downstairs and heard the fall; she went upstairs and helped him up to a chair, but then pt got up and fell again. Pt's sister was concerned he was having a stroke so she called EMS. s/p code stroke in the ED.      In ED the patient was evaluated, and code stroke was called. CTH showed no acute infarct or hemorrhage. CTA head and neck showed no LVO or stenosis or aneurysms. CT perfusion showed no core infarct. IV TPA was not given because LKN was last night, NIHSS 4-5. Pt was given 2 baby ASA. He was found to have new onset Afib in ED, and upon my evaluation his BP was elevated at 156/118.      Medical progress: Patient is very weak, frail, deconditioned. Care discussed with neurology -  CT in the am. In addition, care discussed with Dr. Chaparro. Anticipated acute rehab on monday.     REVIEW OF SYSTEMS:  Poor historian    Physical Exam:   GENERAL APPEARANCE:  NAD, hemodynamically stable  T(C): 36.6 (04-15-22 @ 09:08), Max: 36.9 (22 @ 21:24)  HR: 98 (04-15-22 @ 09:08) (90 - 111)  BP: 102/78 (04-15-22 @ 09:08) (102/78 - 132/78)  RR: 18 (04-15-22 @ 09:08) (18 - 19)  SpO2: 95% (04-15-22 @ 09:08) (93% - 98%)  Wt(kg): --  HEENT:  Head is normocephalic    Skin:  Warm and dry without any rash   NECK:  Supple without lymphadenopathy.   HEART:  Regular rate and rhythm. normal S1 and S2, No M/R/G  LUNGS:  Good ins/exp effort, no W/R/R/C  ABDOMEN:  Soft, nontender, nondistended with good bowel sounds heard  EXTREMITIES:  Without cyanosis, clubbing or edema.   NEUROLOGICAL: dysarthria   slight flattening of left nasolabial fold, mild dysarthria, tongue protrudes in the midline  motor: + left pronator drift, 4/5 in LUE, 4-/5 in LLE, Left leg drift, 5/5 in RUE and RLE  sensory: intact to light touch  coordination: +dysmetria on left finger to nose        CBC Full  -  ( 2022 08:05 )  WBC Count : 13.90 K/uL  RBC Count : 5.26 M/uL  Hemoglobin : 16.0 g/dL  Hematocrit : 45.1 %  Platelet Count - Automated : 180 K/uL      Urinalysis Basic - ( 15 Apr 2022 10:45 )    Color: Yellow / Appearance: Clear / S.020 / pH: x  Gluc: x / Ketone: Trace  / Bili: Negative / Urobili: Negative   Blood: x / Protein: Negative / Nitrite: Negative   Leuk Esterase: Negative / RBC: x / WBC x   Sq Epi: x / Non Sq Epi: x / Bacteria: x          135  |  105  |  18  ----------------------------<  107<H>  3.7   |  24  |  0.94    Ca    8.9      2022 08:05        # Acute right CVA, with hemorrhagic transformation. ? embolic d/t new onset Afib. Patient also with uncontrolled HTN  - Repeat Head CT on  shows more visible hemorrhagic transformation   - Pt not on AC secondary to hemorrhagic transformation.   - No surgical intervention at this time.  - Continue to hold AC and anti-platelet therapy.  - Repeat head CT on   - Keep SBP < 140  - Continue statin    # New onset a-fib  - EF on echo - 15%.  - Have to hold AC for now  - Cardiology is following.    # CHF. Severely reduced LVEF of 15%- unknown etiology. Will hold off on ACEI for now and will increase betablocker to better rate control  - eventually need a cath or perfusion study with viability    - patient's cardiomyopathy secondary to     # (+) Troponin Likely demand in setting of CVA, afib   - Medical mgmt as above.   - Only one set positive and later sets negative.     Patient has an advanced medical history with low EF /  CVA with hemorrhagic transformation - palliative care eval

## 2022-04-15 NOTE — DIETITIAN INITIAL EVALUATION ADULT - OTHER INFO
62 yr old M with no PMHX was brought to ED by EMS on 4/11/22 s/p fall for LE weakness. Patient states at 5:30 am this morning he got up to turn off the TV when his leg gave way and he fell. Pt's sister lives downstairs and heard the fall; she went upstairs and helped him up to a chair, but then pt got up and fell again. Pt's sister was concerned he was having a stroke so she called EMS. CHF. Severely reduced LVEF of 15%- unknown etiology. Pt observed with breakfast tray->50% consumption however may have fluctuating appetite and intake. NFPE- moderate muscle/fat wasting. Pt may benefit from additional high protein supplement (Gelatein plus jello BID (additional 40gm protein). Will continue to monitor and follow up prn. See below for recommendations. Criteria met for severe malnutrition.

## 2022-04-15 NOTE — PROGRESS NOTE ADULT - ASSESSMENT
62 yr old M with no PMHX was brought to ED by EMS on 4/11/22 s/p fall for LE weakness. Patient states at 5:30 am this morning he got up to turn off the TV when his leg gave way and he fell. Pt's sister lives downstairs and heard the fall; she went upstairs and helped him up to a chair, but then pt got up and fell again. Pt's sister was concerned he was having a stroke so she called EMS.     In ED the patient was evaluated, and code stroke was called. CTH showed no acute infarct or hemorrhage. CTA head and neck showed no LVO or stenosis or aneurysms. CT perfusion showed no core infarct. IV TPA was not given because LKN was last night, NIHSS 4-5. Pt was given 2 baby ASA. He was found to have new onset Afib in ED, and upon my evaluation his BP was elevated at 156/118.    #Acute right CVA, with hemorrhagic transformation. ? embolic d/t new onset Afib. Patient also with uncontrolled HTN    -Repeat Head CT on 4/14 shows more visible hemorrhagic transformation, but mass effect is similar and pt remains clinically stable  -No surgical intervention at this time.  -Continue to hold AC and anti-platelet therapy.  -Repeat head CT on 4/16.   -Neurosurgery is following  -Keep SBP < 140  -Continue statin    # New onset a-fib    -EF on echo ~ 15%.  -Have to hold AC for now  -Cardiology is following.

## 2022-04-15 NOTE — PROGRESS NOTE ADULT - CARDIOVASCULAR DETAILS
irregular rate and rhythm
irregular rate and rhythm
irregular rate and rhythm/positive S1/positive S2

## 2022-04-15 NOTE — DIETITIAN INITIAL EVALUATION ADULT - PERTINENT MEDS FT
MEDICATIONS  (STANDING):  atorvastatin 80 milliGRAM(s) Oral at bedtime  chlorhexidine 4% Liquid 1 Application(s) Topical <User Schedule>  furosemide   Injectable 20 milliGRAM(s) IV Push daily  metoprolol tartrate 50 milliGRAM(s) Oral two times a day    MEDICATIONS  (PRN):

## 2022-04-15 NOTE — CONSULT NOTE ADULT - SUBJECTIVE AND OBJECTIVE BOX
62yM was admitted on     In ED, GCS=    Patient is a 62y old  Male who presents with a chief complaint of Left sided weakness (15 Apr 2022 07:50)    HPI:  62 yr old M with no PMHX was brought to ED by EMS on 22 s/p fall for LE weakness. Patient states at 5:30 am this morning he got up to turn off the TV when his leg gave way and he fell. Pt's sister lives downstairs and heard the fall; she went upstairs and helped him up to a chair, but then pt got up and fell again. Pt's sister was concerned he was having a stroke so she called EMS.     In ED the patient was evaluated, and code stroke was called. CTH showed no acute infarct or hemorrhage. CTA head and neck showed no LVO or stenosis or aneurysms. CT perfusion showed no core infarct. IV TPA was not given because LKN was last night, NIHSS 3. Pt was given 2 baby ASA. He was found to have new onset Afib in ED, and upon my evaluation BP was elevated at 156/118.    At bedside pt is awake and alert, is aware that he fell earlier this morning. He denies any Chest pain , numbness, tingling, headache, visual changes, dizziness or vertigo. Patient does have a hx of speech impediment but noticed he had some difficulty speaking this morning; Denies any dysphagia;  Pt's sister states that he has not seen a medical doctor in years, does not take any medications; Patient sister Nancy was at bedsided who he would like to make decisions for him [phone]    - Patient seen and examined. No acute events overnight. Head CT yesterday stable. Patient drowsy awakens for exam, following commands minimally verbal. Denies headache, n/v, numb/tingling, weakness.    - pt seen and examined on 3N, no events overnight, pt seems frustrated and uncomfortable in bed, continued left arm dysmetria and weakness, states his left leg feel heavy, denies headache, dizziness, nausea or vomiting     4/15 seen by cardiology  1. Atrial fibrillation.  rate controlled. HR is 80s this AM   Pt not on AC secondary to hemorrhagic transformation.   cont metoprolol  2. CHF. Severely reduced LVEF of 15%- unknown etiology. Will hold off on ACEI for now and will increase betablocker to better rate control. MAy eventually need a cath or perfusion study with viability when clinical status improves , probably as oupt   3. +Troponins. Likely demand in setting of CVA, afib, CHF.   Medical mgmt as above.   Only one set positive and later sets negative.       PAST MEDICAL & SURGICAL HISTORY: None  FAMILY HISTORY: mother  at 87 from dementia; Father  at abdelrahman age of 87 from AFIB  Social Hx:  Nonsmoker, no drug or alcohol use (2022 12:00)      Imaging performed:  Head CT 2022: No acute intracranial hemorrhage or mass effect    CT angio gram Brain 2022:  CT brain perfusion: Apparent increased mean transit time in the bilateral temporal lobes, more on the right, and right frontal lobe, as well as in the bilateral cerebellum, may be artifactual. Consider further evaluation with MRI.    CTA head and neck:  -Patent cervical and intracranial major arterial vasculature without evidence of abrupt vessel cut off, hemodynamically significant stenosis, aneurysm, or dissection.  -Bilateral pleural effusions.     MRI Brain 2022  IMPRESSION: Acute infarction within the anterior RIGHT basal ganglia and straightening restricted diffusion and central hemorrhage measuring 2.4 cm, with mass effect on the anterior horn of the RIGHT lateral ventricle. Smaller acute infarctions involve the anterior inferior RIGHT frontal lobe, the posterior RIGHT basal ganglia and posterior RIGHT insular cortex as well as the posterior RIGHT temporal on lateral RIGHT occipital cortex, with restricted diffusion.    Head CT 2022  Slightly increased conspicuity of hemorrhagic transformation of right basal ganglia acute infarction. Similar regional mass effect.  Similar-appearing acute infarction involving the right inferolateral frontal lobe and right mid corona radiata.  No midline shift. No hydrocephalus.      REVIEW OF SYSTEMS  Constitutional - No fever, No weight loss, No fatigue  HEENT - No eye pain, No visual disturbances, No difficulty hearing, No tinnitus, No vertigo, No neck pain  Respiratory - No cough, No wheezing, No shortness of breath  Cardiovascular - No chest pain, No palpitations  Gastrointestinal - No abdominal pain, No nausea, No vomiting, No diarrhea, No constipation  Genitourinary - No dysuria, No frequency, No hematuria, No incontinence  Neurological - No headaches, No memory loss, No loss of strength, No numbness, No tremors  Skin - No itching, No rashes, No lesions   Endocrine - No temperature intolerance  Musculoskeletal - No joint pain, No joint swelling, No muscle pain  Psychiatric - No depression, No anxiety    VITALS  T(C): 36.8 (04-15-22 @ 04:00), Max: 36.9 (22 @ 21:24)  HR: 99 (04-15-22 @ 04:00) (90 - 111)  BP: 132/78 (04-15-22 @ 04:00) (108/78 - 132/78)  RR: 18 (04-15-22 @ 04:00) (18 - 19)  SpO2: 96% (04-15-22 @ 04:00) (92% - 98%)  Wt(kg): --    PAST MEDICAL & SURGICAL HISTORY      SOCIAL HISTORY - as per documentation/history  Smoking - None  EtOH - None  Drugs - None    FUNCTIONAL HISTORY  Lives   Independent    CURRENT FUNCTIONAL STATUS      FAMILY HISTORY       RECENT LABS - Reviewed  CBC Full  -  ( 2022 08:05 )  WBC Count : 13.90 K/uL  RBC Count : 5.26 M/uL  Hemoglobin : 16.0 g/dL  Hematocrit : 45.1 %  Platelet Count - Automated : 180 K/uL  Mean Cell Volume : 85.7 fl  Mean Cell Hemoglobin : 30.4 pg  Mean Cell Hemoglobin Concentration : 35.5 gm/dL  Auto Neutrophil # : x  Auto Lymphocyte # : x  Auto Monocyte # : x  Auto Eosinophil # : x  Auto Basophil # : x  Auto Neutrophil % : x  Auto Lymphocyte % : x  Auto Monocyte % : x  Auto Eosinophil % : x  Auto Basophil % : x        135  |  105  |  18  ----------------------------<  107<H>  3.7   |  24  |  0.94    Ca    8.9      2022 08:05          ALLERGIES  No Known Allergies      MEDICATIONS   atorvastatin 80 milliGRAM(s) Oral at bedtime  chlorhexidine 4% Liquid 1 Application(s) Topical <User Schedule>  furosemide   Injectable 20 milliGRAM(s) IV Push daily  metoprolol tartrate 50 milliGRAM(s) Oral two times a day      ----------------------------------------------------------------------------------------  PHYSICAL EXAM  Constitutional - NAD, Comfortable  HEENT - NCAT, EOMI  Neck - Supple, No limited ROM  Chest - Breathing comfortably, No wheezing  Cardiovascular - S1S2   Abdomen - Soft   Extremities - No C/C/E, No calf tenderness   Neurologic Exam -                    Cognitive - AAO to self, place, date, year, situation     Communication - Fluent, No dysarthria     Cranial Nerves - CN 2-12 intact     Motor - No focal deficits                    LEFT    UE - ShAB 5/5, EF 5/5, EE 5/5, WE 5/5,  5/5                    RIGHT UE - ShAB 5/5, EF 5/5, EE 5/5, WE 5/5,  5/5                    LEFT    LE - HF 5/5, KE 5/5, DF 5/5, PF 5/5                    RIGHT LE - HF 5/5, KE 5/5, DF 5/5, PF 5/5        Sensory - Intact to LT     Reflexes - DTR Intact, No primitive reflexive     Coordination - FTN intact     OculoVestibular - No saccades, No nystagmus, VOR         Balance - WNL Static  Psychiatric - Mood stable, Affect WNL  ----------------------------------------------------------------------------------------  ASSESSMENT/PLAN  62yMale with functional deficits after  Pain - Tylenol  DVT PPX - SCDs  Rehab -    Recommend ACUTE inpatient rehabilitation for the functional deficits consisting of 3 hours of therapy/day & 24 hour RN/daily PMR physician for comorbid medical management. Patient will be able to tolerate 3 hours a day.   Recommend [facility], patient DOES NOT meet acute inpatient rehabilitation criteria. Patient needs a more prolonged stay to achieve transition to community.    Expect patient to achieve functional goals for DC HOME with OUTPATIENT   Expect patient to achieve functional goals for DC HOME with HOME CARE   Follow up with [facility] - Call [phone] for an appointment    Will continue to follow. Functional progress will determine ongoing rehab dispo recommendations, which may change.    Continue bedside therapy as well as OOB throughout the day with mobilization by staff to maintain cardiopulmonary function and prevention of secondary complications related to debility.     Discussed with rehab team.  62yM was admitted on     Patient is a 62y old  Male who presents with a chief complaint of Left sided weakness (15 Apr 2022 07:50)    HPI:  62 yr old M with no PMHX was brought to ED by EMS on 22 s/p fall for LE weakness. Patient states at 5:30 am this morning he got up to turn off the TV when his leg gave way and he fell. Pt's sister lives downstairs and heard the fall; she went upstairs and helped him up to a chair, but then pt got up and fell again. Pt's sister was concerned he was having a stroke so she called EMS.     In ED the patient was evaluated, and code stroke was called. CTH showed no acute infarct or hemorrhage. CTA head and neck showed no LVO or stenosis or aneurysms. CT perfusion showed no core infarct. IV TPA was not given because LKN was last night, NIHSS 3. Pt was given 2 baby ASA. He was found to have new onset Afib in ED, and upon my evaluation BP was elevated at 156/118.    At bedside pt is awake and alert, is aware that he fell earlier this morning. He denies any Chest pain , numbness, tingling, headache, visual changes, dizziness or vertigo. Patient does have a hx of speech impediment but noticed he had some difficulty speaking this morning; Denies any dysphagia;  Pt's sister states that he has not seen a medical doctor in years, does not take any medications; Patient sister Nancy was at bedsided who he would like to make decisions for him [phone]    - Patient seen and examined. No acute events overnight. Head CT yesterday stable. Patient drowsy awakens for exam, following commands minimally verbal. Denies headache, n/v, numb/tingling, weakness.    - pt seen and examined on 3N, no events overnight, pt seems frustrated and uncomfortable in bed, continued left arm dysmetria and weakness, states his left leg feel heavy, denies headache, dizziness, nausea or vomiting     4/15 seen by cardiology  1. Atrial fibrillation.  rate controlled. HR is 80s this AM   Pt not on AC secondary to hemorrhagic transformation.   cont metoprolol  2. CHF. Severely reduced LVEF of 15%- unknown etiology. Will hold off on ACEI for now and will increase betablocker to better rate control. MAy eventually need a cath or perfusion study with viability when clinical status improves , probably as oupt   3. +Troponins. Likely demand in setting of CVA, afib, CHF.   Medical mgmt as above.   Only one set positive and later sets negative.       PAST MEDICAL & SURGICAL HISTORY: None  FAMILY HISTORY: mother  at 87 from dementia; Father  at abdelrahman age of 87 from AFIB  Social Hx:  Nonsmoker, no drug or alcohol use (2022 12:00)      Imaging performed:  Head CT 2022: No acute intracranial hemorrhage or mass effect    CT angio gram Brain 2022:  CT brain perfusion: Apparent increased mean transit time in the bilateral temporal lobes, more on the right, and right frontal lobe, as well as in the bilateral cerebellum, may be artifactual. Consider further evaluation with MRI.    CTA head and neck:  -Patent cervical and intracranial major arterial vasculature without evidence of abrupt vessel cut off, hemodynamically significant stenosis, aneurysm, or dissection.  -Bilateral pleural effusions.     MRI Brain 2022  IMPRESSION: Acute infarction within the anterior RIGHT basal ganglia and straightening restricted diffusion and central hemorrhage measuring 2.4 cm, with mass effect on the anterior horn of the RIGHT lateral ventricle. Smaller acute infarctions involve the anterior inferior RIGHT frontal lobe, the posterior RIGHT basal ganglia and posterior RIGHT insular cortex as well as the posterior RIGHT temporal on lateral RIGHT occipital cortex, with restricted diffusion.    Head CT 2022  Slightly increased conspicuity of hemorrhagic transformation of right basal ganglia acute infarction. Similar regional mass effect.  Similar-appearing acute infarction involving the right inferolateral frontal lobe and right mid corona radiata.  No midline shift. No hydrocephalus.      REVIEW OF SYSTEMS  Constitutional - No fever, No weight loss, No fatigue  HEENT - No eye pain, No visual disturbances, No difficulty hearing, No tinnitus, No vertigo, No neck pain  Respiratory - No cough, No wheezing, No shortness of breath  Cardiovascular - No chest pain, No palpitations  Gastrointestinal - No abdominal pain, No nausea, No vomiting, No diarrhea, No constipation  Genitourinary - No dysuria, No frequency, No hematuria, No incontinence  Neurological - No headaches, No memory loss, No loss of strength, No numbness, No tremors  Skin - No itching, No rashes, No lesions   Endocrine - No temperature intolerance  Musculoskeletal - No joint pain, No joint swelling, No muscle pain  Psychiatric - No depression, No anxiety    VITALS  T(C): 36.8 (04-15-22 @ 04:00), Max: 36.9 (22 @ 21:24)  HR: 99 (04-15-22 @ 04:00) (90 - 111)  BP: 132/78 (04-15-22 @ 04:00) (108/78 - 132/78)  RR: 18 (04-15-22 @ 04:00) (18 - 19)  SpO2: 96% (04-15-22 @ 04:00) (92% - 98%)  Wt(kg): --    PAST MEDICAL & SURGICAL HISTORY      SOCIAL HISTORY - as per documentation/history  Smoking - None  EtOH - None  Drugs - None    FUNCTIONAL HISTORY  Lives with sister in a pvt home. 3 CHULA 0HR 12 steps to bedroom  RHR. PTa I amb w/o AD  Sister works during the day    CURRENT FUNCTIONAL STATUS  supine to sit  maximum assist of one person  Sit to stand Maximum assist of 1 person. Pushes left. Left sided weakness    RECENT LABS - Reviewed  CBC Full  -  ( 2022 08:05 )  WBC Count : 13.90 K/uL  RBC Count : 5.26 M/uL  Hemoglobin : 16.0 g/dL  Hematocrit : 45.1 %  Platelet Count - Automated : 180 K/uL  Mean Cell Volume : 85.7 fl  Mean Cell Hemoglobin : 30.4 pg  Mean Cell Hemoglobin Concentration : 35.5 gm/dL  Auto Neutrophil # : x  Auto Lymphocyte # : x  Auto Monocyte # : x  Auto Eosinophil # : x  Auto Basophil # : x  Auto Neutrophil % : x  Auto Lymphocyte % : x  Auto Monocyte % : x  Auto Eosinophil % : x  Auto Basophil % : x        135  |  105  |  18  ----------------------------<  107<H>  3.7   |  24  |  0.94    Ca    8.9      2022 08:05    ALLERGIES  No Known Allergies    MEDICATIONS   atorvastatin 80 milliGRAM(s) Oral at bedtime  chlorhexidine 4% Liquid 1 Application(s) Topical <User Schedule>  furosemide   Injectable 20 milliGRAM(s) IV Push daily  metoprolol tartrate 50 milliGRAM(s) Oral two times a day      ----------------------------------------------------------------------------------------  PHYSICAL EXAM  Constitutional - NAD, Comfortable  HEENT - NCAT, EOMI   Neck - Supple, No limited ROM  Chest - Breathing comfortably, No wheezing  Cardiovascular - S1S2   Abdomen - Soft   Extremities - No C/C/E, No calf tenderness   Neurologic Exam -                    Cognitive - AAO to self, place, date, year, situation     Communication - Fluent, No dysarthria     Cranial Nerves - CN 2-12 intact     Motor - No focal deficits                    LEFT    UE - ShAB 2/5, EF 2/5, EE 2/5, WE 2/5,  2/5                    RIGHT UE - ShAB 4/5, EF 4/5, EE 4/5, WE 4/5,  4/5                    LEFT    LE - HF 3-/5, KE 3-/5, DF 3-/5, PF 3-/5                    RIGHT LE - HF 4/5, KE 4/5, DF 4/5, PF 4/5        Sensory - Intact to LT     Reflexes - DTR right 2/4 Left 1/     Coordination - FTN intact     OculoVestibular - No saccades, No nystagmus, VOR         Balance - poor Static  Psychiatric - Mood stable, Affect WNL  ----------------------------------------------------------------------------------------

## 2022-04-15 NOTE — PROGRESS NOTE ADULT - SUBJECTIVE AND OBJECTIVE BOX
Interval History:  4/15/22: No new events    MEDICATIONS  (STANDING):  atorvastatin 80 milliGRAM(s) Oral at bedtime  chlorhexidine 4% Liquid 1 Application(s) Topical <User Schedule>  furosemide   Injectable 20 milliGRAM(s) IV Push daily  metoprolol tartrate 50 milliGRAM(s) Oral two times a day    MEDICATIONS  (PRN):      Allergies    No Known Allergies    Intolerances        PHYSICAL EXAM:  Vital Signs Last 24 Hrs  T(F): 97.9 (04-15-22 @ 09:08)  HR: 98 (04-15-22 @ 09:08)  BP: 102/78 (04-15-22 @ 09:08)  RR: 18 (04-15-22 @ 09:08)    GENERAL: NAD, well-groomed  HEAD:  Atraumatic, Normocephalic  Neuro:  Awake, alert, no aphasia  CN: PERRL, EOMI, no nystagmus, slight flattening of left nasolabial fold, mild dysarthria, tongue protrudes in the midline  motor: + left pronator drift, 4/5 in LUE, 4-/5 in LLE, Left leg drift, 5/5 in RUE and RLE  sensory: intact to light touch  coordination: +dysmetria on left finger to nose  DTRs: symmetric, plantar responses flexor bilaterally          LABS:                        16.0   13.90 )-----------( 180      ( 2022 08:05 )             45.1         135  |  105  |  18  ----------------------------<  107<H>  3.7   |  24  |  0.94    Ca    8.9      2022 08:05        Urinalysis Basic - ( 15 Apr 2022 10:45 )    Color: Yellow / Appearance: Clear / S.020 / pH: x  Gluc: x / Ketone: Trace  / Bili: Negative / Urobili: Negative   Blood: x / Protein: Negative / Nitrite: Negative   Leuk Esterase: Negative / RBC: x / WBC x   Sq Epi: x / Non Sq Epi: x / Bacteria: x        RADIOLOGY & ADDITIONAL STUDIES:  CT Head No Cont (22 @ 08:19) >    IMPRESSION:    Slightly increased conspicuity of hemorrhagic transformation of right   basal ganglia acute infarction. Similar regional mass effect.    Similar-appearing acute infarction involving the right inferolateral   frontal lobe and right mid corona radiata.    No midline shift. No hydrocephalus.    < end of copied text >    CT Angio Neck w/ IV Cont (22 @ 07:42) >    IMPRESSION:  CT brain perfusion: Apparent increased mean transit time in the bilateral   temporal lobes, more on the right, and right frontal lobe, as well as in   the bilateral cerebellum, may be artifactual. Consider further evaluation   with MRI.    CTA head and neck:    -Moderate atherosclerotic calcifications along the bilateral carotid   bifurcations without significant stenosis. The internal carotid arteries   are patent without significant stenosis. Dense atherosclerotic   calcifications along the bilateral cavernous segments without significant   stenosis.  -Patent cervical and intracranial major arterial vasculature without   evidence of abrupt vessel cut off, hemodynamically significant stenosis,   aneurysm, or dissection.  -Bilateral pleural effusions.    CT Brain Stroke Protocol (22 @ 07:31) >    IMPRESSION:  No acute intracranial hemorrhage or mass effect.    MRI head 22:  Acute infarction within the anterior RIGHT basal ganglia   and straightening restricted diffusion and central hemorrhage measuring   2.4 cm, with mass effect on the anterior horn of the RIGHT lateral   ventricle. Smaller acute infarctions involve the anterior inferior RIGHT   frontal lobe, the posterior RIGHT basal ganglia and posterior RIGHT   insular cortex as well as the posterior RIGHT temporal on lateral RIGHT   occipital cortex,  with restricted diffusion.    Echo 22:     Moderate (2+) mitral regurgitation is present.   Normal aortic valve structure and function.   Moderate (2+) tricuspid valve regurgitation is present.   The left ventricle cavity is mildly dilated.   Estimated left ventricular ejection fraction is 15 %.   Severe, diffuse hypokinesis of the left ventricle is present.   The right ventricle exhibits mild diffuse hypokinesis, and mild   depression   of contractility.   Pleural effusion - is present.      CT head 22:  Slightly increased conspicuity of hemorrhagic transformation of right   basal ganglia acute infarction. Similar regional mass effect.    Similar-appearing acute infarction involving the right inferolateral   frontal lobe and right mid corona radiata.    No midline shift. No hydrocephalus.

## 2022-04-15 NOTE — PROGRESS NOTE ADULT - SUBJECTIVE AND OBJECTIVE BOX
Patient is a 62y old  Male who presents with a chief complaint of Left sided weakness (14 Apr 2022 16:35)    4/15- lethargic but arousable , denies HA   tele shows rate controlled afib     MEDICATIONS  (STANDING):  atorvastatin 80 milliGRAM(s) Oral at bedtime  chlorhexidine 4% Liquid 1 Application(s) Topical <User Schedule>  furosemide   Injectable 20 milliGRAM(s) IV Push daily  metoprolol tartrate 50 milliGRAM(s) Oral two times a day    MEDICATIONS  (PRN):            Vital Signs Last 24 Hrs  T(C): 36.8 (15 Apr 2022 04:00), Max: 36.9 (14 Apr 2022 21:24)  T(F): 98.2 (15 Apr 2022 04:00), Max: 98.5 (14 Apr 2022 21:24)  HR: 99 (15 Apr 2022 04:00) (90 - 111)  BP: 132/78 (15 Apr 2022 04:00) (108/78 - 132/78)  BP(mean): 88 (15 Apr 2022 04:00) (88 - 88)  RR: 18 (15 Apr 2022 04:00) (18 - 19)  SpO2: 96% (15 Apr 2022 04:00) (92% - 98%)            INTERPRETATION OF TELEMETRY:    ECG:        LABS:                        16.0   13.90 )-----------( 180      ( 14 Apr 2022 08:05 )             45.1     04-14    135  |  105  |  18  ----------------------------<  107<H>  3.7   |  24  |  0.94    Ca    8.9      14 Apr 2022 08:05              I&O's Summary    14 Apr 2022 07:01  -  15 Apr 2022 07:00  --------------------------------------------------------  IN: 0 mL / OUT: 300 mL / NET: -300 mL      BNP  RADIOLOGY & ADDITIONAL STUDIES:

## 2022-04-15 NOTE — DIETITIAN INITIAL EVALUATION ADULT - PERTINENT LABORATORY DATA
04-14    135  |  105  |  18  ----------------------------<  107<H>  3.7   |  24  |  0.94    Ca    8.9      14 Apr 2022 08:05    A1C with Estimated Average Glucose Result: 5.3 % (04-12-22 @ 06:59)

## 2022-04-16 LAB
ALBUMIN SERPL ELPH-MCNC: 3 G/DL — LOW (ref 3.3–5)
ALP SERPL-CCNC: 89 U/L — SIGNIFICANT CHANGE UP (ref 40–120)
ALT FLD-CCNC: 31 U/L — SIGNIFICANT CHANGE UP (ref 12–78)
ANION GAP SERPL CALC-SCNC: 8 MMOL/L — SIGNIFICANT CHANGE UP (ref 5–17)
AST SERPL-CCNC: 21 U/L — SIGNIFICANT CHANGE UP (ref 15–37)
BILIRUB SERPL-MCNC: 2.1 MG/DL — HIGH (ref 0.2–1.2)
BUN SERPL-MCNC: 27 MG/DL — HIGH (ref 7–23)
CALCIUM SERPL-MCNC: 9.4 MG/DL — SIGNIFICANT CHANGE UP (ref 8.5–10.1)
CHLORIDE SERPL-SCNC: 104 MMOL/L — SIGNIFICANT CHANGE UP (ref 96–108)
CO2 SERPL-SCNC: 23 MMOL/L — SIGNIFICANT CHANGE UP (ref 22–31)
CREAT SERPL-MCNC: 0.95 MG/DL — SIGNIFICANT CHANGE UP (ref 0.5–1.3)
EGFR: 89 ML/MIN/1.73M2 — SIGNIFICANT CHANGE UP
GLUCOSE SERPL-MCNC: 101 MG/DL — HIGH (ref 70–99)
HCT VFR BLD CALC: 45.5 % — SIGNIFICANT CHANGE UP (ref 39–50)
HGB BLD-MCNC: 16 G/DL — SIGNIFICANT CHANGE UP (ref 13–17)
MCHC RBC-ENTMCNC: 30.5 PG — SIGNIFICANT CHANGE UP (ref 27–34)
MCHC RBC-ENTMCNC: 35.2 GM/DL — SIGNIFICANT CHANGE UP (ref 32–36)
MCV RBC AUTO: 86.7 FL — SIGNIFICANT CHANGE UP (ref 80–100)
PLATELET # BLD AUTO: 176 K/UL — SIGNIFICANT CHANGE UP (ref 150–400)
POTASSIUM SERPL-MCNC: 3.7 MMOL/L — SIGNIFICANT CHANGE UP (ref 3.5–5.3)
POTASSIUM SERPL-SCNC: 3.7 MMOL/L — SIGNIFICANT CHANGE UP (ref 3.5–5.3)
PROT SERPL-MCNC: 6.6 GM/DL — SIGNIFICANT CHANGE UP (ref 6–8.3)
RBC # BLD: 5.25 M/UL — SIGNIFICANT CHANGE UP (ref 4.2–5.8)
RBC # FLD: 13.2 % — SIGNIFICANT CHANGE UP (ref 10.3–14.5)
SODIUM SERPL-SCNC: 135 MMOL/L — SIGNIFICANT CHANGE UP (ref 135–145)
WBC # BLD: 15.62 K/UL — HIGH (ref 3.8–10.5)
WBC # FLD AUTO: 15.62 K/UL — HIGH (ref 3.8–10.5)

## 2022-04-16 PROCEDURE — 99232 SBSQ HOSP IP/OBS MODERATE 35: CPT

## 2022-04-16 PROCEDURE — 70450 CT HEAD/BRAIN W/O DYE: CPT | Mod: 26

## 2022-04-16 PROCEDURE — 71250 CT THORAX DX C-: CPT | Mod: 26

## 2022-04-16 PROCEDURE — 99233 SBSQ HOSP IP/OBS HIGH 50: CPT

## 2022-04-16 RX ORDER — DIGOXIN 250 MCG
250 TABLET ORAL EVERY 8 HOURS
Refills: 0 | Status: COMPLETED | OUTPATIENT
Start: 2022-04-16 | End: 2022-04-17

## 2022-04-16 RX ORDER — DIGOXIN 250 MCG
500 TABLET ORAL ONCE
Refills: 0 | Status: COMPLETED | OUTPATIENT
Start: 2022-04-16 | End: 2022-04-16

## 2022-04-16 RX ORDER — DIGOXIN 250 MCG
250 TABLET ORAL DAILY
Refills: 0 | Status: DISCONTINUED | OUTPATIENT
Start: 2022-04-17 | End: 2022-04-17

## 2022-04-16 RX ADMIN — Medication 20 MILLIGRAM(S): at 12:08

## 2022-04-16 RX ADMIN — Medication 50 MILLIGRAM(S): at 23:16

## 2022-04-16 RX ADMIN — CHLORHEXIDINE GLUCONATE 1 APPLICATION(S): 213 SOLUTION TOPICAL at 06:07

## 2022-04-16 RX ADMIN — ATORVASTATIN CALCIUM 80 MILLIGRAM(S): 80 TABLET, FILM COATED ORAL at 23:17

## 2022-04-16 RX ADMIN — Medication 500 MICROGRAM(S): at 20:56

## 2022-04-16 RX ADMIN — Medication 250 MICROGRAM(S): at 23:17

## 2022-04-16 NOTE — PROGRESS NOTE ADULT - SUBJECTIVE AND OBJECTIVE BOX
Patient is a 63 y/o/m w/ PMHx, admitted in the ED 22 s/p fall for LE weakness. Patient states at 5:30 am this morning he got up to turn off the TV when his leg gave way and he fell. Pt's sister lives downstairs and heard the fall; she went upstairs and helped him up to a chair, but then pt got up and fell again. Pt's sister was concerned he was having a stroke so she called EMS. s/p code stroke in the ED.      In ED the patient was evaluated, and code stroke was called. CTH showed no acute infarct or hemorrhage. CTA head and neck showed no LVO or stenosis or aneurysms. CT perfusion showed no core infarct. IV TPA was not given because LKN was last night, NIHSS 4-5. Pt was given 2 baby ASA. He was found to have new onset Afib in ED, and upon my evaluation his BP was elevated at 156/118.    Medical progress: Denies any HA, CP, SOB. NO fevers, chills or shakes. Continues to weakness. Labs and vitals reviewed.   Complaints: no new complaints  State of mind: normal    REVIEW OF SYSTEMS:  Poor historian    Physical Exam:   GENERAL APPEARANCE: + weakness  T(C): 36.8 (2022 08:25), Max: 36.9 (15 Apr 2022 17:27)  T(F): 98.3 (2022 08:25), Max: 98.4 (15 Apr 2022 17:27)  HR: 66 (2022 10:29) (66 - 104)  BP: 118/76 (2022 10:29) (99/72 - 118/76)  BP(mean): 86 (15 Apr 2022 21:30) (86 - 86)  RR: 18 (2022 08:25) (18 - 18)  SpO2: 95% (2022 08:25) (95% - 99%)  HEENT:  Head is normocephalic    Skin:  Warm and dry without any rash   NECK:  Supple without lymphadenopathy.   HEART:  Regular rate and rhythm. normal S1 and S2   LUNGS:  Good ins/exp effort, no W/R/R/C  ABDOMEN:  Soft, nontender, nondistended with good bowel sounds heard  EXTREMITIES:  Without cyanosis, clubbing or edema.   NEUROLOGICAL: dysarthria   slight flattening of left nasolabial fold, mild dysarthria, tongue protrudes in the midline. coordination: +dysmetria on left finger to nose    Labs:   CBC Full  -  ( 2022 08:31 )  WBC Count : 15.62 K/uL  RBC Count : 5.25 M/uL  Hemoglobin : 16.0 g/dL  Hematocrit : 45.5 %  Platelet Count - Automated : 176 K/uL    Urinalysis Basic - ( 15 Apr 2022 10:45 )    Color: Yellow / Appearance: Clear / S.020 / pH: x  Gluc: x / Ketone: Trace  / Bili: Negative / Urobili: Negative   Blood: x / Protein: Negative / Nitrite: Negative   Leuk Esterase: Negative / RBC: x / WBC x   Sq Epi: x / Non Sq Epi: x / Bacteria: x      135  |  104  |  27<H>  ----------------------------<  101<H>  3.7   |  23  |  0.95    Ca    9.4      2022 08:31    TPro  6.6  /  Alb  3.0<L>  /  TBili  2.1<H>  /  DBili  x   /  AST  21  /  ALT  31  /  AlkPhos  89  04-16      # Acute right CVA, with hemorrhagic transformation. ? embolic d/t new onset Afib  # Uncontrolled HTN  - Repeat Head CT on  shows more visible hemorrhagic transformation   - Pt not on AC secondary to hemorrhagic transformation.   - No surgical intervention at this time.  - Repeat head CT on   - Keep SBP < 140  - Continue statin / hold AC and anti-platelet therapy    # New onset a-fib  - EF on echo - 15%.  - Have to hold AC for now  - Cardiology is following.    # CHF. Severely reduced LVEF of 15%- unknown etiology. Will hold off on ACEI for now and will increase betablocker to better rate control  - eventually need a cath or perfusion study with viability    - patient's cardiomyopathy secondary to     # (+) Troponin Likely demand in setting of CVA, afib   - Medical mgmt as above.   - Only one set positive and later sets negative.     Patient has an advanced medical history with low EF /  CVA with hemorrhagic transformation - palliative care eval     Patient is a 63 y/o/m w/ PMHx, admitted in the ED 22 s/p fall for LE weakness. Patient states at 5:30 am this morning he got up to turn off the TV when his leg gave way and he fell. Pt's sister lives downstairs and heard the fall; she went upstairs and helped him up to a chair, but then pt got up and fell again. Pt's sister was concerned he was having a stroke so she called EMS. s/p code stroke in the ED.      In ED the patient was evaluated, and code stroke was called. CTH showed no acute infarct or hemorrhage. CTA head and neck showed no LVO or stenosis or aneurysms. CT perfusion showed no core infarct. IV TPA was not given because LKN was last night, NIHSS 4-5. Pt was given 2 baby ASA. He was found to have new onset Afib in ED, and upon my evaluation his BP was elevated at 156/118.    Medical progress: Denies any HA, CP, SOB. NO fevers, chills or shakes. Continues to weakness.   Complaints: no new complaints  State of mind: normal    REVIEW OF SYSTEMS:  Poor historian    Physical Exam:   GENERAL APPEARANCE: + weakness  T(C): 36.8 (2022 08:25), Max: 36.9 (15 Apr 2022 17:27)  T(F): 98.3 (2022 08:25), Max: 98.4 (15 Apr 2022 17:27)  HR: 66 (2022 10:29) (66 - 104)  BP: 118/76 (2022 10:29) (99/72 - 118/76)  BP(mean): 86 (15 Apr 2022 21:30) (86 - 86)  RR: 18 (2022 08:25) (18 - 18)  SpO2: 95% (2022 08:25) (95% - 99%)  HEENT:  Head is normocephalic    Skin:  Warm and dry without any rash   NECK:  Supple without lymphadenopathy.   HEART:  Regular rate and rhythm. normal S1 and S2   LUNGS:  Good ins/exp effort, no W/R/R/C  ABDOMEN:  Soft, nontender, nondistended with good bowel sounds heard  EXTREMITIES:  Without cyanosis, clubbing or edema.   NEUROLOGICAL: dysarthria   slight flattening of left nasolabial fold, mild dysarthria, tongue protrudes in the midline. coordination: +dysmetria on left finger to nose    Labs:   CBC Full  -  ( 2022 08:31 )  WBC Count : 15.62 K/uL  RBC Count : 5.25 M/uL  Hemoglobin : 16.0 g/dL  Hematocrit : 45.5 %  Platelet Count - Automated : 176 K/uL    Urinalysis Basic - ( 15 Apr 2022 10:45 )    Color: Yellow / Appearance: Clear / S.020 / pH: x  Gluc: x / Ketone: Trace  / Bili: Negative / Urobili: Negative   Blood: x / Protein: Negative / Nitrite: Negative   Leuk Esterase: Negative / RBC: x / WBC x   Sq Epi: x / Non Sq Epi: x / Bacteria: x      135  |  104  |  27<H>  ----------------------------<  101<H>  3.7   |  23  |  0.95    Ca    9.4      2022 08:31    TPro  6.6  /  Alb  3.0<L>  /  TBili  2.1<H>  /  DBili  x   /  AST  21  /  ALT  31  /  AlkPhos  89  04-16      # Acute right CVA, with hemorrhagic transformation. ? embolic d/t new onset Afib  # Uncontrolled HTN  - Repeat Head CT on  shows more visible hemorrhagic transformation   - Pt not on AC secondary to hemorrhagic transformation.   - No surgical intervention at this time.  - Repeat head CT on   - Keep SBP < 140  - Continue statin / hold AC and anti-platelet therapy    # New onset a-fib  - EF on echo - 15%.  - Have to hold AC for now  - Cardiology is following.    # CHF. Severely reduced LVEF of 15%- unknown etiology. Will hold off on ACEI for now and will increase betablocker to better rate control  - eventually need a cath or perfusion study with viability    - patient's cardiomyopathy secondary to     # Leukocytosis  - patient does not appears to have any fevers, chills or shakes  - no signs of infection /  if any fevers, chills or shakes -  will initiate infectious workup    # (+) Troponin Likely demand in setting of CVA, afib   - Medical mgmt as above.   - Only one set positive and later sets negative.     Patient has an advanced medical history with low EF /  CVA with hemorrhagic transformation - palliative care eval     Patient is a 63 y/o/m w/ PMHx, admitted in the ED 22 s/p fall for LE weakness. Patient states at 5:30 am this morning he got up to turn off the TV when his leg gave way and he fell. Pt's sister lives downstairs and heard the fall; she went upstairs and helped him up to a chair, but then pt got up and fell again. Pt's sister was concerned he was having a stroke so she called EMS. s/p code stroke in the ED.      In ED the patient was evaluated, and code stroke was called. CTH showed no acute infarct or hemorrhage. CTA head and neck showed no LVO or stenosis or aneurysms. CT perfusion showed no core infarct. IV TPA was not given because LKN was last night, NIHSS 4-5. Pt was given 2 baby ASA. He was found to have new onset Afib in ED, and upon my evaluation his BP was elevated at 156/118.    Medical progress: Denies any HA, CP, SOB. NO fevers, chills or shakes. Continues to weakness.   Complaints: no new complaints  State of mind: normal    Patient with low BPs and High HRs -  given poor tolerace to BB, will dig load patient    REVIEW OF SYSTEMS:  Poor historian    Physical Exam:   GENERAL APPEARANCE: + weakness  T(C): 36.8 (2022 08:25), Max: 36.9 (15 Apr 2022 17:27)  T(F): 98.3 (2022 08:25), Max: 98.4 (15 Apr 2022 17:27)  HR: 66 (2022 10:29) (66 - 104)  BP: 118/76 (2022 10:29) (99/72 - 118/76)  BP(mean): 86 (15 Apr 2022 21:30) (86 - 86)  RR: 18 (2022 08:25) (18 - 18)  SpO2: 95% (2022 08:25) (95% - 99%)  HEENT:  Head is normocephalic    Skin:  Warm and dry without any rash   NECK:  Supple without lymphadenopathy.   HEART:  Regular rate and rhythm. normal S1 and S2   LUNGS:  Good ins/exp effort, no W/R/R/C  ABDOMEN:  Soft, nontender, nondistended with good bowel sounds heard  EXTREMITIES:  Without cyanosis, clubbing or edema.   NEUROLOGICAL: dysarthria   slight flattening of left nasolabial fold, mild dysarthria, tongue protrudes in the midline. coordination: +dysmetria on left finger to nose    Labs:   CBC Full  -  ( 2022 08:31 )  WBC Count : 15.62 K/uL  RBC Count : 5.25 M/uL  Hemoglobin : 16.0 g/dL  Hematocrit : 45.5 %  Platelet Count - Automated : 176 K/uL    Urinalysis Basic - ( 15 Apr 2022 10:45 )    Color: Yellow / Appearance: Clear / S.020 / pH: x  Gluc: x / Ketone: Trace  / Bili: Negative / Urobili: Negative   Blood: x / Protein: Negative / Nitrite: Negative   Leuk Esterase: Negative / RBC: x / WBC x   Sq Epi: x / Non Sq Epi: x / Bacteria: x      135  |  104  |  27<H>  ----------------------------<  101<H>  3.7   |  23  |  0.95    Ca    9.4      2022 08:31    TPro  6.6  /  Alb  3.0<L>  /  TBili  2.1<H>  /  DBili  x   /  AST  21  /  ALT  31  /  AlkPhos  89        # Acute right CVA, with hemorrhagic transformation. ? embolic d/t new onset Afib  # Uncontrolled HTN  - Repeat Head CT on  shows more visible hemorrhagic transformation   - Pt not on AC secondary to hemorrhagic transformation.   - No surgical intervention at this time.  - Repeat head CT on   - Keep SBP < 140  - Continue statin / hold AC and anti-platelet therapy    # New onset a-fib  - EF on echo - 15%.  - Have to hold AC for now  - Cardiology is following.    # CHF. Severely reduced LVEF of 15%- unknown etiology. Will hold off on ACEI for now and will increase betablocker to better rate control  - eventually need a cath or perfusion study with viability    - patient's cardiomyopathy secondary to     # Leukocytosis  - patient does not appears to have any fevers, chills or shakes  - no signs of infection /  if any fevers, chills or shakes -  will initiate infectious workup    # (+) Troponin Likely demand in setting of CVA, afib   - Medical mgmt as above.   - Only one set positive and later sets negative.     Patient has an advanced medical history with low EF /  CVA with hemorrhagic transformation - palliative care eval     Patient is a 63 y/o/m w/ PMHx, admitted in the ED 22 s/p fall for LE weakness. Patient states at 5:30 am this morning he got up to turn off the TV when his leg gave way and he fell. Pt's sister lives downstairs and heard the fall; she went upstairs and helped him up to a chair, but then pt got up and fell again. Pt's sister was concerned he was having a stroke so she called EMS. s/p code stroke in the ED.      In ED the patient was evaluated, and code stroke was called. CTH showed no acute infarct or hemorrhage. CTA head and neck showed no LVO or stenosis or aneurysms. CT perfusion showed no core infarct. IV TPA was not given because LKN was last night, NIHSS 4-5. Pt was given 2 baby ASA. He was found to have new onset Afib in ED, and upon my evaluation his BP was elevated at 156/118.    Medical progress: Denies any HA, CP, SOB. NO fevers, chills or shakes. Continues to weakness.   Complaints: no new complaints  State of mind: normal    Patient with low BPs and High HRs -  given poor tolerace to BB, will dig load patient    REVIEW OF SYSTEMS:  Poor historian    Physical Exam:   GENERAL APPEARANCE: + weakness  T(C): 36.8 (2022 08:25), Max: 36.9 (15 Apr 2022 17:27)  T(F): 98.3 (2022 08:25), Max: 98.4 (15 Apr 2022 17:27)  HR: 66 (2022 10:29) (66 - 104)  BP: 118/76 (2022 10:29) (99/72 - 118/76)  BP(mean): 86 (15 Apr 2022 21:30) (86 - 86)  RR: 18 (2022 08:25) (18 - 18)  SpO2: 95% (2022 08:25) (95% - 99%)  HEENT:  Head is normocephalic    Skin:  Warm and dry without any rash   NECK:  Supple without lymphadenopathy.   HEART:  Regular rate and rhythm. normal S1 and S2   LUNGS:  Good ins/exp effort, no W/R/R/C  ABDOMEN:  Soft, nontender, nondistended with good bowel sounds heard  EXTREMITIES:  Without cyanosis, clubbing or edema.   NEUROLOGICAL: dysarthria   slight flattening of left nasolabial fold, mild dysarthria, tongue protrudes in the midline. coordination: +dysmetria on left finger to nose    Labs:   CBC Full  -  ( 2022 08:31 )  WBC Count : 15.62 K/uL  RBC Count : 5.25 M/uL  Hemoglobin : 16.0 g/dL  Hematocrit : 45.5 %  Platelet Count - Automated : 176 K/uL    Urinalysis Basic - ( 15 Apr 2022 10:45 )    Color: Yellow / Appearance: Clear / S.020 / pH: x  Gluc: x / Ketone: Trace  / Bili: Negative / Urobili: Negative   Blood: x / Protein: Negative / Nitrite: Negative   Leuk Esterase: Negative / RBC: x / WBC x   Sq Epi: x / Non Sq Epi: x / Bacteria: x      135  |  104  |  27<H>  ----------------------------<  101<H>  3.7   |  23  |  0.95    Ca    9.4      2022 08:31    TPro  6.6  /  Alb  3.0<L>  /  TBili  2.1<H>  /  DBili  x   /  AST  21  /  ALT  31  /  AlkPhos  89        # Acute right CVA, with hemorrhagic transformation. ? embolic d/t new onset Afib  # Uncontrolled HTN  - Repeat Head CT on  shows more visible hemorrhagic transformation   - Pt not on AC secondary to hemorrhagic transformation.   - No surgical intervention at this time.  - Repeat head CT on   - Keep SBP < 140  - Continue statin / hold AC and anti-platelet therapy    # New onset a-fib  - EF on echo - 15%.  - Have to hold AC for now  - patient with low Bps 90/60s, with HRs 120s. Patient started to be digloaded.   - Cardiology is following.    # CHF. Severely reduced LVEF of 15%- unknown etiology. Will hold off on ACEI for now and will increase betablocker to better rate control  - eventually need a cath or perfusion study with viability    - patient's cardiomyopathy secondary to     # Leukocytosis  - patient does not appears to have any fevers, chills or shakes  - no signs of infection /  if any fevers, chills or shakes -  will initiate infectious workup    # (+) Troponin Likely demand in setting of CVA, afib   - Medical mgmt as above.   - Only one set positive and later sets negative.     Patient has an advanced medical history with low EF /  CVA with hemorrhagic transformation - palliative care eval

## 2022-04-16 NOTE — PROGRESS NOTE ADULT - TIME BILLING
63 y/o male (haven't seen a physician or had wellness check in decades) initially presented after a fall due to LLE weakness, found to have R basal ganglia CVA, NIHSS 3, not a tPA candidate, new onset A.fib and newly diagnosed systolic heart failure    Subsequently found to have hemorrhage in the infarct area and A.fib with RVR, transferred to ICU 4/12 for q1 neurochecks     He had received aspirin 162 mg on admission but no therapeutic AC       Plan:    CT head and neuro exam stable   No neurosurgical intervention   Change neurochecks to q4  PT eval   Continue holding AC, AP     A.fib with HR 90s  Off dilt drip per cardiology given systolic dysfn   Continue BB, statin, lasix   Add ACEI if BP allows   Defer AC to cardiology, neurosx   He will need ischemic eval when stable     Resp status is stable on RA     Tolerating po diet       Stable for tele, d/w neurosurgery       DVT ppx: SCDs  Nutrition: po  Central line: no  Arterial line: no  Oropeza: no  Restraints: no  Activity: oob    Code status: full     Disposition: stable for tele, sign out given to hospitalist at 16.30    Updated: sister at bedside - told her about new A.fib and heart failure diagnoses and that he will need more work up for his heart in the future, she will have him continue to follow with Dr. Raza as outpatient and find a PCP for him

## 2022-04-16 NOTE — PROGRESS NOTE ADULT - ASSESSMENT
· Assessment	  62 yr old M with no PMHX was brought to ED by EMS on 4/11/22 s/p fall for LE weakness. Patient states at 5:30 am this morning he got up to turn off the TV when his leg gave way and he fell. Pt's sister lives downstairs and heard the fall; she went upstairs and helped him up to a chair, but then pt got up and fell again. Pt's sister was concerned he was having a stroke so she called EMS.    #Acute right CVA, with hemorrhagic transformation. ? embolic d/t new onset Afib. Patient also with uncontrolled HTN    -Repeat Head CT on 4/14 shows more visible hemorrhagic transformation, but mass effect is similar and pt remains clinically stable.    -No surgical intervention at this time.  -Continue to hold AC and anti-platelet therapy.  -Repeat head CT on 4/16 pending   -Neurosurgery is following  -Keep SBP < 140  -Continue statin    # New onset a-fib    -EF on echo ~ 15%.  -Have to hold AC for now  -Cardiology is following.  -PT/ OT   -Discussed with Staff and patient

## 2022-04-16 NOTE — PROGRESS NOTE ADULT - SUBJECTIVE AND OBJECTIVE BOX
· Reason for Admission	Left sided weakness  4/16/22 : Pt says he is feeling better, left side weakness improving . Seen by PT, OOB ambulated with walker .     MEDICATIONS  (STANDING):  atorvastatin 80 milliGRAM(s) Oral at bedtime  chlorhexidine 4% Liquid 1 Application(s) Topical <User Schedule>  furosemide   Injectable 20 milliGRAM(s) IV Push daily  metoprolol tartrate 50 milliGRAM(s) Oral two times a day      Vital Signs Last 24 Hrs  T(C): 36.8 (16 Apr 2022 08:25), Max: 36.9 (15 Apr 2022 17:27)  T(F): 98.3 (16 Apr 2022 08:25), Max: 98.4 (15 Apr 2022 17:27)  HR: 66 (16 Apr 2022 10:29) (66 - 104)  BP: 118/76 (16 Apr 2022 10:29) (99/72 - 118/76)  BP(mean): 86 (15 Apr 2022 21:30) (86 - 86)  RR: 18 (16 Apr 2022 08:25) (18 - 18)  SpO2: 95% (16 Apr 2022 08:25) (95% - 99%)    GENERAL: NAD, well-groomed  HEAD:  Atraumatic, Normocephalic  Neuro:  Awake, alert, no aphasia  CN: PERRL, EOMI, no nystagmus, slight flattening of left nasolabial fold, mild dysarthria, tongue protrudes in the midline  motor: + left pronator drift, 4/5 in LUE, 4-/5 in LLE, Left leg drift, 5/5 in RUE and RLE  sensory: intact to light touch  coordination: +dysmetria on left finger to nose  DTRs: symmetric, plantar responses flexor bilaterally                          16.0   15.62 )-----------( 176      ( 16 Apr 2022 08:31 )             45.5     04-16    135  |  104  |  27<H>  ----------------------------<  101<H>  3.7   |  23  |  0.95    Ca    9.4      16 Apr 2022 08:31    TPro  6.6  /  Alb  3.0<L>  /  TBili  2.1<H>  /  DBili  x   /  AST  21  /  ALT  31  /  AlkPhos  89  04-16      04-12 Chol 149 LDL -- HDL 43 Trig 81    Radiology report:  - CT Head  CT Head No Cont (04.14.22 @ 08:19) >    IMPRESSION:    Slightly increased conspicuity of hemorrhagic transformation of right   basal ganglia acute infarction. Similar regional mass effect.    Similar-appearing acute infarction involving the right inferolateral   frontal lobe and right mid corona radiata.    No midline shift. No hydrocephalus.    < end of copied text >    CT Angio Neck w/ IV Cont (04.11.22 @ 07:42) >    IMPRESSION:  CT brain perfusion: Apparent increased mean transit time in the bilateral   temporal lobes, more on the right, and right frontal lobe, as well as in   the bilateral cerebellum, may be artifactual. Consider further evaluation   with MRI.  CTA head and neck:    -Moderate atherosclerotic calcifications along the bilateral carotid   bifurcations without significant stenosis. The internal carotid arteries   are patent without significant stenosis. Dense atherosclerotic   calcifications along the bilateral cavernous segments without significant   stenosis.  -Patent cervical and intracranial major arterial vasculature without   evidence of abrupt vessel cut off, hemodynamically significant stenosis,   aneurysm, or dissection.  -Bilateral pleural effusions.    MRI head 4/12/22:  Acute infarction within the anterior RIGHT basal ganglia   and straightening restricted diffusion and central hemorrhage measuring   2.4 cm, with mass effect on the anterior horn of the RIGHT lateral   ventricle. Smaller acute infarctions involve the anterior inferior RIGHT   frontal lobe, the posterior RIGHT basal ganglia and posterior RIGHT   insular cortex as well as the posterior RIGHT temporal on lateral RIGHT   occipital cortex,  with restricted diffusion.

## 2022-04-16 NOTE — CONSULT NOTE ADULT - SUBJECTIVE AND OBJECTIVE BOX
Patient is a 62y old  Male who presents with a chief complaint of Left sided weakness (2022 11:04)    HPI:  62 yr old M with no PMHX was brought to ED by EMS on 22 s/p fall for LE weakness. Patient states at 5:30 am this morning he got up to turn off the TV when his leg gave way and he fell. Pt's sister lives downstairs and heard the fall; she went upstairs and helped him up to a chair, but then pt got up and fell again. Pt's sister was concerned he was having a stroke so she called EMS. In ED the patient was evaluated, and code stroke was called. CTH showed no acute infarct or hemorrhage. CTA head and neck showed no LVO or stenosis or aneurysms. CT perfusion showed no core infarct. IV TPA was not given because LKN was last night, NIHSS 3. Pt was given 2 baby ASA. He was found to have new onset Afib in ED, and upon my evaluation BP was elevated at 156/118.    PMH: as above  PSH: as above  Meds: per reconciliation sheet, noted below  MEDICATIONS  (STANDING):  atorvastatin 80 milliGRAM(s) Oral at bedtime  chlorhexidine 4% Liquid 1 Application(s) Topical <User Schedule>  furosemide   Injectable 20 milliGRAM(s) IV Push daily  metoprolol tartrate 50 milliGRAM(s) Oral two times a day    Allergies    No Known Allergies    Intolerances      Social: no smoking, no alcohol, no illegal drugs; no recent travel, no exposure to TB  FAMILY HISTORY:     no history of premature cardiovascular disease in first degree relatives    ROS: the patient denies fever, no chills, no HA, no dizziness, no sore throat, no blurry vision, no CP, no palpitations, no SOB, no cough, no abdominal pain, no diarrhea, no N/V, no dysuria, no leg pain, no claudication, no rash, no joint aches, no rectal pain or bleeding, no night sweats    All other systems reviewed and are negative    Vital Signs Last 24 Hrs  T(C): 36.8 (2022 08:25), Max: 36.9 (15 Apr 2022 17:27)  T(F): 98.3 (2022 08:25), Max: 98.4 (15 Apr 2022 17:27)  HR: 66 (2022 10:29) (66 - 104)  BP: 118/76 (2022 10:29) (99/72 - 118/76)  BP(mean): 86 (15 Apr 2022 21:30) (86 - 86)  RR: 18 (2022 08:25) (18 - 18)  SpO2: 95% (2022 08:25) (95% - 99%)  Daily     Daily Weight in k.2 (2022 05:29)    PE:  Constitutional: frail looking  HEENT: NC/AT, EOMI, PERRLA, conjunctivae clear; ears and nose atraumatic; pharynx benign  Neck: supple; thyroid not palpable  Back: no tenderness  Respiratory: respiratory effort normal; clear to auscultation  Cardiovascular: S1S2 regular, no murmurs  Abdomen: soft, not tender, not distended, positive BS; liver and spleen WNL  Genitourinary: no suprapubic tenderness  Lymphatic: no LN palpable  Musculoskeletal: no muscle tenderness, no joint swelling or tenderness  Extremities: no pedal edema  Neurological/ Psychiatric:  moving all extremities  Skin: no rashes; no palpable lesions    Labs: all available labs reviewed                        16.0   15.62 )-----------( 176      ( 2022 08:31 )             45.5     04-16    135  |  104  |  27<H>  ----------------------------<  101<H>  3.7   |  23  |  0.95    Ca    9.4      2022 08:31    TPro  6.6  /  Alb  3.0<L>  /  TBili  2.1<H>  /  DBili  x   /  AST  21  /  ALT  31  /  AlkPhos  89  04-16     LIVER FUNCTIONS - ( 2022 08:31 )  Alb: 3.0 g/dL / Pro: 6.6 gm/dL / ALK PHOS: 89 U/L / ALT: 31 U/L / AST: 21 U/L / GGT: x           Urinalysis Basic - ( 15 Apr 2022 10:45 )    Color: Yellow / Appearance: Clear / S.020 / pH: x  Gluc: x / Ketone: Trace  / Bili: Negative / Urobili: Negative   Blood: x / Protein: Negative / Nitrite: Negative   Leuk Esterase: Negative / RBC: x / WBC x   Sq Epi: x / Non Sq Epi: x / Bacteria: x          Radiology: all available radiological tests reviewed      ACC: [id number] EXAM:  CT BRAIN                          PROCEDURE DATE:  2022          INTERPRETATION:  Clinical indications: Follow-up hemorrhage    Multiple axial sections were performed from base skull to vertex without   contrast enhancement. Coronal and sagittal destructions were performed    This exam is compared with prior head CT performed on 2022.    Previously noted hemorrhagic infarct involving the right basal   ganglia/corona radiata region is again seen. Overall the hemorrhagic   component appears slightly less conspicuous which is compatible with some   expected evolutionary changes. Mass effect on the right lateral ventricle   is again seen. Right to left shift (3.1 mm) seen.    Evaluation of the osseous structures with the appropriate window appears   normal    The visualized paranasal sinuses mastoid and middle ear regions appear   clear.    IMPRESSION: Hemorrhagic infarct involving the right basal internal/corona   radiata region is again seen with some expected evolution changes   involving the hemorrhagic component.        Advanced directives addressed: full resuscitation Patient is a 62y old  Male who presents with a chief complaint of Left sided weakness (2022 11:04)    HPI:  62 yr old M with no PMHX was brought to ED by EMS on 22 s/p fall for LE weakness. Patient states at 5:30 am this morning he got up to turn off the TV when his leg gave way and he fell. Pt's sister lives downstairs and heard the fall; she went upstairs and helped him up to a chair, but then pt got up and fell again. Pt's sister was concerned he was having a stroke so she called EMS. In ED the patient was evaluated, and code stroke was called. Imaging showed acute R CVA with hemorrhagic transformation. He was found to have new onset Afib in ED. Has elevated wbc ct 15, infectious workup unremarkable so far.     PMH: as above  PSH: as above  Meds: per reconciliation sheet, noted below  MEDICATIONS  (STANDING):  atorvastatin 80 milliGRAM(s) Oral at bedtime  chlorhexidine 4% Liquid 1 Application(s) Topical <User Schedule>  furosemide   Injectable 20 milliGRAM(s) IV Push daily  metoprolol tartrate 50 milliGRAM(s) Oral two times a day    Allergies    No Known Allergies    Intolerances      Social: no smoking, no alcohol, no illegal drugs; no recent travel, no exposure to TB  FAMILY HISTORY:     no history of premature cardiovascular disease in first degree relatives    ROS: the patient denies fever, no chills, no HA, no dizziness, no sore throat, no blurry vision, no CP, no palpitations, no SOB, no cough, no abdominal pain, no diarrhea, no N/V, no dysuria, no leg pain, no claudication, no rash, no joint aches, no rectal pain or bleeding, no night sweats    All other systems reviewed and are negative    Vital Signs Last 24 Hrs  T(C): 36.8 (2022 08:25), Max: 36.9 (15 Apr 2022 17:27)  T(F): 98.3 (2022 08:25), Max: 98.4 (15 Apr 2022 17:27)  HR: 66 (2022 10:29) (66 - 104)  BP: 118/76 (2022 10:29) (99/72 - 118/76)  BP(mean): 86 (15 Apr 2022 21:30) (86 - 86)  RR: 18 (2022 08:25) (18 - 18)  SpO2: 95% (2022 08:25) (95% - 99%)  Daily     Daily Weight in k.2 (2022 05:29)    PE:  Constitutional: NAD   HEENT: NC/AT, EOMI, PERRLA, conjunctivae clear; ears and nose atraumatic; pharynx benign  Neck: supple; thyroid not palpable  Back: no tenderness  Respiratory: respiratory effort normal; clear to auscultation  Cardiovascular: S1S2 regular, no murmurs  Abdomen: soft, not tender, not distended, positive BS; liver and spleen WNL  Genitourinary: no suprapubic tenderness  Lymphatic: no LN palpable  Musculoskeletal: no muscle tenderness, no joint swelling or tenderness  Extremities: no pedal edema  Neurological/ Psychiatric: L sided weakness   Skin: no rashes; no palpable lesions    Labs: all available labs reviewed                        16.0   15.62 )-----------( 176      ( 2022 08:31 )             45.5     04-16    135  |  104  |  27<H>  ----------------------------<  101<H>  3.7   |  23  |  0.95    Ca    9.4      2022 08:31    TPro  6.6  /  Alb  3.0<L>  /  TBili  2.1<H>  /  DBili  x   /  AST  21  /  ALT  31  /  AlkPhos  89  04-16     LIVER FUNCTIONS - ( 2022 08:31 )  Alb: 3.0 g/dL / Pro: 6.6 gm/dL / ALK PHOS: 89 U/L / ALT: 31 U/L / AST: 21 U/L / GGT: x           Urinalysis Basic - ( 15 Apr 2022 10:45 )    Color: Yellow / Appearance: Clear / S.020 / pH: x  Gluc: x / Ketone: Trace  / Bili: Negative / Urobili: Negative   Blood: x / Protein: Negative / Nitrite: Negative   Leuk Esterase: Negative / RBC: x / WBC x   Sq Epi: x / Non Sq Epi: x / Bacteria: x          Radiology: all available radiological tests reviewed      ACC: [id number] EXAM:  CT BRAIN                          PROCEDURE DATE:  2022          INTERPRETATION:  Clinical indications: Follow-up hemorrhage    Multiple axial sections were performed from base skull to vertex without   contrast enhancement. Coronal and sagittal destructions were performed    This exam is compared with prior head CT performed on 2022.    Previously noted hemorrhagic infarct involving the right basal   ganglia/corona radiata region is again seen. Overall the hemorrhagic   component appears slightly less conspicuous which is compatible with some   expected evolutionary changes. Mass effect on the right lateral ventricle   is again seen. Right to left shift (3.1 mm) seen.    Evaluation of the osseous structures with the appropriate window appears   normal    The visualized paranasal sinuses mastoid and middle ear regions appear   clear.    IMPRESSION: Hemorrhagic infarct involving the right basal internal/corona   radiata region is again seen with some expected evolution changes   involving the hemorrhagic component.        Advanced directives addressed: full resuscitation

## 2022-04-17 LAB
ALBUMIN SERPL ELPH-MCNC: 3.4 G/DL — SIGNIFICANT CHANGE UP (ref 3.3–5)
ALP SERPL-CCNC: 101 U/L — SIGNIFICANT CHANGE UP (ref 40–120)
ALT FLD-CCNC: 36 U/L — SIGNIFICANT CHANGE UP (ref 12–78)
ANION GAP SERPL CALC-SCNC: 6 MMOL/L — SIGNIFICANT CHANGE UP (ref 5–17)
AST SERPL-CCNC: 31 U/L — SIGNIFICANT CHANGE UP (ref 15–37)
BILIRUB SERPL-MCNC: 1.9 MG/DL — HIGH (ref 0.2–1.2)
BUN SERPL-MCNC: 34 MG/DL — HIGH (ref 7–23)
CALCIUM SERPL-MCNC: 8.9 MG/DL — SIGNIFICANT CHANGE UP (ref 8.5–10.1)
CHLORIDE SERPL-SCNC: 103 MMOL/L — SIGNIFICANT CHANGE UP (ref 96–108)
CO2 SERPL-SCNC: 27 MMOL/L — SIGNIFICANT CHANGE UP (ref 22–31)
CREAT SERPL-MCNC: 1.06 MG/DL — SIGNIFICANT CHANGE UP (ref 0.5–1.3)
DIGOXIN SERPL-MCNC: 3.67 NG/ML — CRITICAL HIGH (ref 0.8–2)
EGFR: 78 ML/MIN/1.73M2 — SIGNIFICANT CHANGE UP
GLUCOSE SERPL-MCNC: 116 MG/DL — HIGH (ref 70–99)
HCT VFR BLD CALC: 48.2 % — SIGNIFICANT CHANGE UP (ref 39–50)
HGB BLD-MCNC: 16.1 G/DL — SIGNIFICANT CHANGE UP (ref 13–17)
MCHC RBC-ENTMCNC: 30.2 PG — SIGNIFICANT CHANGE UP (ref 27–34)
MCHC RBC-ENTMCNC: 33.4 GM/DL — SIGNIFICANT CHANGE UP (ref 32–36)
MCV RBC AUTO: 90.4 FL — SIGNIFICANT CHANGE UP (ref 80–100)
PLATELET # BLD AUTO: 191 K/UL — SIGNIFICANT CHANGE UP (ref 150–400)
POTASSIUM SERPL-MCNC: 4.1 MMOL/L — SIGNIFICANT CHANGE UP (ref 3.5–5.3)
POTASSIUM SERPL-SCNC: 4.1 MMOL/L — SIGNIFICANT CHANGE UP (ref 3.5–5.3)
PROT SERPL-MCNC: 7.3 GM/DL — SIGNIFICANT CHANGE UP (ref 6–8.3)
RBC # BLD: 5.33 M/UL — SIGNIFICANT CHANGE UP (ref 4.2–5.8)
RBC # FLD: 13.1 % — SIGNIFICANT CHANGE UP (ref 10.3–14.5)
SODIUM SERPL-SCNC: 136 MMOL/L — SIGNIFICANT CHANGE UP (ref 135–145)
WBC # BLD: 15.14 K/UL — HIGH (ref 3.8–10.5)
WBC # FLD AUTO: 15.14 K/UL — HIGH (ref 3.8–10.5)

## 2022-04-17 PROCEDURE — 12011 RPR F/E/E/N/L/M 2.5 CM/<: CPT

## 2022-04-17 PROCEDURE — 99233 SBSQ HOSP IP/OBS HIGH 50: CPT

## 2022-04-17 PROCEDURE — 76700 US EXAM ABDOM COMPLETE: CPT | Mod: 26

## 2022-04-17 PROCEDURE — 93010 ELECTROCARDIOGRAM REPORT: CPT

## 2022-04-17 PROCEDURE — 70450 CT HEAD/BRAIN W/O DYE: CPT | Mod: 26

## 2022-04-17 RX ADMIN — ATORVASTATIN CALCIUM 80 MILLIGRAM(S): 80 TABLET, FILM COATED ORAL at 21:57

## 2022-04-17 RX ADMIN — Medication 50 MILLIGRAM(S): at 21:57

## 2022-04-17 RX ADMIN — Medication 250 MICROGRAM(S): at 06:01

## 2022-04-17 RX ADMIN — CHLORHEXIDINE GLUCONATE 1 APPLICATION(S): 213 SOLUTION TOPICAL at 06:36

## 2022-04-17 RX ADMIN — Medication 250 MICROGRAM(S): at 05:59

## 2022-04-17 RX ADMIN — Medication 20 MILLIGRAM(S): at 10:21

## 2022-04-17 NOTE — PROVIDER CONTACT NOTE (FALL NOTIFICATION) - ASSESSMENT
Pt has left sided weakness. Pt's call paul was in place. Pt has 3cm laceration over left eye. Stat CT scan ordered. VSS. 130/86, 62 99% on RA.

## 2022-04-17 NOTE — PROCEDURE NOTE - NSICDXPROCEDURE_GEN_ALL_CORE_FT
PROCEDURES:  Simple repair of laceration of face, 2.5 cm or less 17-Apr-2022 12:47:20 L eyeTony Cardona

## 2022-04-17 NOTE — PROGRESS NOTE ADULT - SUBJECTIVE AND OBJECTIVE BOX
Patient is a 63 y/o/m w/ PMHx, admitted in the ED 22 s/p fall for LE weakness. Patient states at 5:30 am this morning he got up to turn off the TV when his leg gave way and he fell. Pt's sister lives downstairs and heard the fall; she went upstairs and helped him up to a chair, but then pt got up and fell again. Pt's sister was concerned he was having a stroke so she called EMS. s/p code stroke in the ED.      In ED the patient was evaluated, and code stroke was called. CTH showed no acute infarct or hemorrhage. CTA head and neck showed no LVO or stenosis or aneurysms. CT perfusion showed no core infarct. IV TPA was not given because LKN was last night, NIHSS 4-5. Pt was given 2 baby ASA. He was found to have new onset Afib in ED, and upon my evaluation his BP was elevated at 156/118.    Medical progress: patient continues to be weak and deconditioned. very frail. Denies any HA, CP, SOB. Still with weakness.   Complaints: no new complaints  State of mind: normal      REVIEW OF SYSTEMS:  Poor historian    Physical Exam:   GENERAL APPEARANCE: + weakness  ICU Vital Signs Last 24 Hrs  T(C): 36.3 (2022 08:10), Max: 37 (2022 15:30)  T(F): 97.4 (2022 08:10), Max: 98.6 (2022 15:30)  HR: 84 (2022 05:44) (66 - 110)  BP: 115/81 (2022 08:10) (91/60 - 130/88)  RR: 17 (2022 08:10) (16 - 18)  SpO2: 100% (2022 08:10) (95% - 100%)  HEENT:  Head is normocephalic    Skin:  Warm and dry without any rash   NECK:  Supple without lymphadenopathy.   HEART:  Regular rate and rhythm. normal S1 and S2   LUNGS:  Good ins/exp effort, no W/R/R/C  ABDOMEN:  Soft, nontender, nondistended with good bowel sounds heard  EXTREMITIES:  Without cyanosis, clubbing or edema.   NEUROLOGICAL: dysarthria   slight flattening of left nasolabial fold, mild dysarthria, tongue protrudes in the midline. coordination: +dysmetria on left finger to nose    Labs:     CBC Full  -  ( 2022 07:59 )  WBC Count : 15.14 K/uL  RBC Count : 5.33 M/uL  Hemoglobin : 16.1 g/dL  Hematocrit : 48.2 %  Platelet Count - Automated : 191 K/uL  Mean Cell Volume : 90.4 fl  Mean Cell Hemoglobin : 30.2 pg  Mean Cell Hemoglobin Concentration : 33.4 gm/dL  Auto Neutrophil # : x  Auto Lymphocyte # : x  Auto Monocyte # : x  Auto Eosinophil # : x  Auto Basophil # : x  Auto Neutrophil % : x  Auto Lymphocyte % : x  Auto Monocyte % : x  Auto Eosinophil % : x  Auto Basophil % : x      Urinalysis Basic - ( 15 Apr 2022 10:45 )    Color: Yellow / Appearance: Clear / S.020 / pH: x  Gluc: x / Ketone: Trace  / Bili: Negative / Urobili: Negative   Blood: x / Protein: Negative / Nitrite: Negative   Leuk Esterase: Negative / RBC: x / WBC x   Sq Epi: x / Non Sq Epi: x / Bacteria: x          135  |  104  |  27<H>  ----------------------------<  101<H>  3.7   |  23  |  0.95    Ca    9.4      2022 08:31    TPro  6.6  /  Alb  3.0<L>  /  TBili  2.1<H>  /  DBili  x   /  AST  21  /  ALT  31  /  AlkPhos  89        # Acute right CVA, with hemorrhagic transformation. ? embolic d/t new onset Afib  # Uncontrolled HTN  - Repeat Head CT on  shows more visible hemorrhagic transformation   - Pt not on AC secondary to hemorrhagic transformation.   - No surgical intervention at this time.  - Repeat head CT on   - Keep SBP < 140  - Continue statin / hold AC and anti-platelet therapy    # New onset a-fib (patient poor rhythm control with good HR control)  - EF on echo - 15%.  - Have to hold AC for now  - patient with low Bps 90/60s, with HRs 120s. Patient started to be digloaded.   - Cardiology is following.    # CHF. Severely reduced LVEF of 15%- unknown etiology. Will hold off on ACEI for now and will increase betablocker to better rate control  - eventually need a cath or perfusion study with viability    - patient's cardiomyopathy secondary to     # Leukocytosis  - patient does not appears to have any fevers, chills or shakes  - no signs of infection /  if any fevers, chills or shakes -  will initiate infectious workup    # (+) Troponin Likely demand in setting of CVA, afib   - Medical mgmt as above.   - Only one set positive and later sets negative.     Patient has an advanced medical history with low EF /  CVA with hemorrhagic transformation - palliative care eval     Patient is a 61 y/o/m w/ PMHx, admitted in the ED 22 s/p fall for LE weakness. Patient states at 5:30 am this morning he got up to turn off the TV when his leg gave way and he fell. Pt's sister lives downstairs and heard the fall; she went upstairs and helped him up to a chair, but then pt got up and fell again. Pt's sister was concerned he was having a stroke so she called EMS. s/p code stroke in the ED.      In ED the patient was evaluated, and code stroke was called. CTH showed no acute infarct or hemorrhage. CTA head and neck showed no LVO or stenosis or aneurysms. CT perfusion showed no core infarct. IV TPA was not given because LKN was last night, NIHSS 4-5. Pt was given 2 baby ASA. He was found to have new onset Afib in ED, and upon my evaluation his BP was elevated at 156/118.    Medical progress: patient continues to be weak and deconditioned. very frail. Denies any HA, CP, SOB. Still with weakness. Patient with significant elevation in dig load -  medication has been held   Complaints: no new complaints  State of mind: normal      REVIEW OF SYSTEMS:  Poor historian    Physical Exam:   GENERAL APPEARANCE: + weakness  ICU Vital Signs Last 24 Hrs  T(C): 36.3 (2022 08:10), Max: 37 (2022 15:30)  T(F): 97.4 (2022 08:10), Max: 98.6 (2022 15:30)  HR: 84 (2022 05:44) (66 - 110)  BP: 115/81 (2022 08:10) (91/60 - 130/88)  RR: 17 (2022 08:10) (16 - 18)  SpO2: 100% (2022 08:10) (95% - 100%)  HEENT:  Head is normocephalic    Skin:  Warm and dry without any rash   NECK:  Supple without lymphadenopathy.   HEART:  Regular rate and rhythm. normal S1 and S2   LUNGS:  Good ins/exp effort, no W/R/R/C  ABDOMEN:  Soft, nontender, nondistended with good bowel sounds heard  EXTREMITIES:  Without cyanosis, clubbing or edema.   NEUROLOGICAL: dysarthria   slight flattening of left nasolabial fold, mild dysarthria, tongue protrudes in the midline. coordination: +dysmetria on left finger to nose    Labs:     CBC Full  -  ( 2022 07:59 )  WBC Count : 15.14 K/uL  RBC Count : 5.33 M/uL  Hemoglobin : 16.1 g/dL  Hematocrit : 48.2 %  Platelet Count - Automated : 191 K/uL  Mean Cell Volume : 90.4 fl  Mean Cell Hemoglobin : 30.2 pg  Mean Cell Hemoglobin Concentration : 33.4 gm/dL  Auto Neutrophil # : x  Auto Lymphocyte # : x  Auto Monocyte # : x  Auto Eosinophil # : x  Auto Basophil # : x  Auto Neutrophil % : x  Auto Lymphocyte % : x  Auto Monocyte % : x  Auto Eosinophil % : x  Auto Basophil % : x      Urinalysis Basic - ( 15 Apr 2022 10:45 )    Color: Yellow / Appearance: Clear / S.020 / pH: x  Gluc: x / Ketone: Trace  / Bili: Negative / Urobili: Negative   Blood: x / Protein: Negative / Nitrite: Negative   Leuk Esterase: Negative / RBC: x / WBC x   Sq Epi: x / Non Sq Epi: x / Bacteria: x          135  |  104  |  27<H>  ----------------------------<  101<H>  3.7   |  23  |  0.95    Ca    9.4      2022 08:31    TPro  6.6  /  Alb  3.0<L>  /  TBili  2.1<H>  /  DBili  x   /  AST  21  /  ALT  31  /  AlkPhos  89        # Acute right CVA, with hemorrhagic transformation. ? embolic d/t new onset Afib  # Uncontrolled HTN  - Repeat Head CT on  shows more visible hemorrhagic transformation   - Pt not on AC secondary to hemorrhagic transformation.   - No surgical intervention at this time.  - Repeat head CT on   - Keep SBP < 140  - Continue statin / hold AC and anti-platelet therapy    # New onset a-fib (patient poor rhythm control with good HR control)  - d/c dig as patient's dig level is elevated  - EF on echo - 15%.  - Have to hold AC for now  - Cardiology is following.    # CHF. Severely reduced LVEF of 15%- unknown etiology. Will hold off on ACEI for now and will increase betablocker to better rate control  - eventually need a cath or perfusion study with viability    - patient's cardiomyopathy secondary to     # Leukocytosis  - patient does not appears to have any fevers, chills or shakes  - no signs of infection /  if any fevers, chills or shakes -  will initiate infectious workup    # (+) Troponin Likely demand in setting of CVA, afib   - Medical mgmt as above.   - Only one set positive and later sets negative.     Patient has an advanced medical history with low EF /  CVA with hemorrhagic transformation - palliative care eval     Patient is a 63 y/o/m w/ PMHx, admitted in the ED 22 s/p fall for LE weakness. Patient states at 5:30 am this morning he got up to turn off the TV when his leg gave way and he fell. Pt's sister lives downstairs and heard the fall; she went upstairs and helped him up to a chair, but then pt got up and fell again. Pt's sister was concerned he was having a stroke so she called EMS. s/p code stroke in the ED.      In ED the patient was evaluated, and code stroke was called. CTH showed no acute infarct or hemorrhage. CTA head and neck showed no LVO or stenosis or aneurysms. CT perfusion showed no core infarct. IV TPA was not given because LKN was last night, NIHSS 4-5. Pt was given 2 baby ASA. He was found to have new onset Afib in ED, and upon my evaluation his BP was elevated at 156/118.    Medical progress: patient continues to be weak and deconditioned. very frail. Denies any HA, CP, SOB. Still with weakness. Patient with significant elevation in dig load -  medication has been held   Complaints: no new complaints  State of mind: normal    Addendum: Patient tried to get up from chair to bed on his own despite being told not to, while nurse was in another room taking care of the patient. patient fell - now with laceration of the (L) orbit /  will scan - head.       REVIEW OF SYSTEMS:  Poor historian    Physical Exam:   GENERAL APPEARANCE: + weakness  ICU Vital Signs Last 24 Hrs  T(C): 36.3 (2022 08:10), Max: 37 (2022 15:30)  T(F): 97.4 (2022 08:10), Max: 98.6 (2022 15:30)  HR: 84 (2022 05:44) (66 - 110)  BP: 115/81 (2022 08:10) (91/60 - 130/88)  RR: 17 (2022 08:10) (16 - 18)  SpO2: 100% (2022 08:10) (95% - 100%)  HEENT:  Head is normocephalic    Skin:  Warm and dry without any rash   NECK:  Supple without lymphadenopathy.   HEART:  Regular rate and rhythm. normal S1 and S2   LUNGS:  Good ins/exp effort, no W/R/R/C  ABDOMEN:  Soft, nontender, nondistended with good bowel sounds heard  EXTREMITIES:  Without cyanosis, clubbing or edema.   NEUROLOGICAL: dysarthria   slight flattening of left nasolabial fold, mild dysarthria, tongue protrudes in the midline. coordination: +dysmetria on left finger to nose    Labs:     CBC Full  -  ( 2022 07:59 )  WBC Count : 15.14 K/uL  RBC Count : 5.33 M/uL  Hemoglobin : 16.1 g/dL  Hematocrit : 48.2 %  Platelet Count - Automated : 191 K/uL  Mean Cell Volume : 90.4 fl  Mean Cell Hemoglobin : 30.2 pg  Mean Cell Hemoglobin Concentration : 33.4 gm/dL  Auto Neutrophil # : x  Auto Lymphocyte # : x  Auto Monocyte # : x  Auto Eosinophil # : x  Auto Basophil # : x  Auto Neutrophil % : x  Auto Lymphocyte % : x  Auto Monocyte % : x  Auto Eosinophil % : x  Auto Basophil % : x      Urinalysis Basic - ( 15 Apr 2022 10:45 )    Color: Yellow / Appearance: Clear / S.020 / pH: x  Gluc: x / Ketone: Trace  / Bili: Negative / Urobili: Negative   Blood: x / Protein: Negative / Nitrite: Negative   Leuk Esterase: Negative / RBC: x / WBC x   Sq Epi: x / Non Sq Epi: x / Bacteria: x          135  |  104  |  27<H>  ----------------------------<  101<H>  3.7   |  23  |  0.95    Ca    9.4      2022 08:31    TPro  6.6  /  Alb  3.0<L>  /  TBili  2.1<H>  /  DBili  x   /  AST  21  /  ALT  31  /  AlkPhos  89        # Acute right CVA, with hemorrhagic transformation. ? embolic d/t new onset Afib  # Uncontrolled HTN  - Repeat Head CT on  shows more visible hemorrhagic transformation   - Pt not on AC secondary to hemorrhagic transformation.   - No surgical intervention at this time.  - Repeat head CT on   - Keep SBP < 140  - Continue statin / hold AC and anti-platelet therapy    # New onset a-fib (patient poor rhythm control with good HR control)  - d/c dig as patient's dig level is elevated  - EF on echo - 15%.  - Have to hold AC for now  - Cardiology is following.    # CHF. Severely reduced LVEF of 15%- unknown etiology. Will hold off on ACEI for now and will increase betablocker to better rate control  - eventually need a cath or perfusion study with viability    - patient's cardiomyopathy secondary to     # Leukocytosis  - patient does not appears to have any fevers, chills or shakes  - no signs of infection /  if any fevers, chills or shakes -  will initiate infectious workup    # (+) Troponin Likely demand in setting of CVA, afib   - Medical mgmt as above.   - Only one set positive and later sets negative.     Patient has an advanced medical history with low EF /  CVA with hemorrhagic transformation - palliative care eval

## 2022-04-17 NOTE — PROVIDER CONTACT NOTE (FALL NOTIFICATION) - ACTION/TREATMENT ORDERED:
Pt denies complaints, no headache. Dssg applied to left eye laceration. Dr Márquez aware and in to see pt. Monitored closely. Call bell in reach, bed alarm set.

## 2022-04-17 NOTE — PROVIDER CONTACT NOTE (FALL NOTIFICATION) - SITUATION
Pt was sitting in the chair but stated he was cold. Pt rang for help but did not wait for assistance. He got up and tried to get into bed but fell hitting left eye on the side rail. Pt was assisted

## 2022-04-18 ENCOUNTER — TRANSCRIPTION ENCOUNTER (OUTPATIENT)
Age: 62
End: 2022-04-18

## 2022-04-18 LAB
ALBUMIN SERPL ELPH-MCNC: 3 G/DL — LOW (ref 3.3–5)
ALP SERPL-CCNC: 101 U/L — SIGNIFICANT CHANGE UP (ref 40–120)
ALT FLD-CCNC: 36 U/L — SIGNIFICANT CHANGE UP (ref 12–78)
ANION GAP SERPL CALC-SCNC: 8 MMOL/L — SIGNIFICANT CHANGE UP (ref 5–17)
AST SERPL-CCNC: 24 U/L — SIGNIFICANT CHANGE UP (ref 15–37)
BILIRUB SERPL-MCNC: 2 MG/DL — HIGH (ref 0.2–1.2)
BUN SERPL-MCNC: 29 MG/DL — HIGH (ref 7–23)
CALCIUM SERPL-MCNC: 9.3 MG/DL — SIGNIFICANT CHANGE UP (ref 8.5–10.1)
CHLORIDE SERPL-SCNC: 105 MMOL/L — SIGNIFICANT CHANGE UP (ref 96–108)
CO2 SERPL-SCNC: 22 MMOL/L — SIGNIFICANT CHANGE UP (ref 22–31)
CREAT SERPL-MCNC: 1.04 MG/DL — SIGNIFICANT CHANGE UP (ref 0.5–1.3)
EGFR: 80 ML/MIN/1.73M2 — SIGNIFICANT CHANGE UP
GLUCOSE SERPL-MCNC: 116 MG/DL — HIGH (ref 70–99)
HCT VFR BLD CALC: 47 % — SIGNIFICANT CHANGE UP (ref 39–50)
HGB BLD-MCNC: 16.4 G/DL — SIGNIFICANT CHANGE UP (ref 13–17)
MCHC RBC-ENTMCNC: 30.5 PG — SIGNIFICANT CHANGE UP (ref 27–34)
MCHC RBC-ENTMCNC: 34.9 GM/DL — SIGNIFICANT CHANGE UP (ref 32–36)
MCV RBC AUTO: 87.4 FL — SIGNIFICANT CHANGE UP (ref 80–100)
PLATELET # BLD AUTO: 226 K/UL — SIGNIFICANT CHANGE UP (ref 150–400)
POTASSIUM SERPL-MCNC: 4.1 MMOL/L — SIGNIFICANT CHANGE UP (ref 3.5–5.3)
POTASSIUM SERPL-SCNC: 4.1 MMOL/L — SIGNIFICANT CHANGE UP (ref 3.5–5.3)
PROT SERPL-MCNC: 7.2 GM/DL — SIGNIFICANT CHANGE UP (ref 6–8.3)
RBC # BLD: 5.38 M/UL — SIGNIFICANT CHANGE UP (ref 4.2–5.8)
RBC # FLD: 13 % — SIGNIFICANT CHANGE UP (ref 10.3–14.5)
SARS-COV-2 RNA SPEC QL NAA+PROBE: SIGNIFICANT CHANGE UP
SODIUM SERPL-SCNC: 135 MMOL/L — SIGNIFICANT CHANGE UP (ref 135–145)
WBC # BLD: 16.53 K/UL — HIGH (ref 3.8–10.5)
WBC # FLD AUTO: 16.53 K/UL — HIGH (ref 3.8–10.5)

## 2022-04-18 PROCEDURE — 99232 SBSQ HOSP IP/OBS MODERATE 35: CPT

## 2022-04-18 PROCEDURE — 71045 X-RAY EXAM CHEST 1 VIEW: CPT | Mod: 26

## 2022-04-18 RX ORDER — DIGOXIN 250 MCG
125 TABLET ORAL DAILY
Refills: 0 | Status: DISCONTINUED | OUTPATIENT
Start: 2022-04-19 | End: 2022-04-28

## 2022-04-18 RX ORDER — METOPROLOL TARTRATE 50 MG
1 TABLET ORAL
Qty: 0 | Refills: 0 | DISCHARGE
Start: 2022-04-18

## 2022-04-18 RX ORDER — FUROSEMIDE 40 MG
20 TABLET ORAL DAILY
Refills: 0 | Status: DISCONTINUED | OUTPATIENT
Start: 2022-04-18 | End: 2022-04-22

## 2022-04-18 RX ORDER — FUROSEMIDE 40 MG
20 TABLET ORAL
Qty: 0 | Refills: 0 | DISCHARGE
Start: 2022-04-18

## 2022-04-18 RX ORDER — ATORVASTATIN CALCIUM 80 MG/1
1 TABLET, FILM COATED ORAL
Qty: 0 | Refills: 0 | DISCHARGE
Start: 2022-04-18

## 2022-04-18 RX ADMIN — ATORVASTATIN CALCIUM 80 MILLIGRAM(S): 80 TABLET, FILM COATED ORAL at 22:04

## 2022-04-18 RX ADMIN — Medication 50 MILLIGRAM(S): at 11:08

## 2022-04-18 RX ADMIN — Medication 50 MILLIGRAM(S): at 22:05

## 2022-04-18 RX ADMIN — CHLORHEXIDINE GLUCONATE 1 APPLICATION(S): 213 SOLUTION TOPICAL at 05:40

## 2022-04-18 RX ADMIN — Medication 20 MILLIGRAM(S): at 11:06

## 2022-04-18 NOTE — CONSULT NOTE ADULT - SUBJECTIVE AND OBJECTIVE BOX
Patient is a 62y old  Male who presents with a chief complaint of Left sided weakness.          PAST MEDICAL & SURGICAL HISTORY:      HPI:      PREVIOUS DIAGNOSTIC TESTING:      ECHO  FINDINGS:    CHEST CT PULMONARY ANGIO with IV Contrast:  FINDINGS:  MEDICATIONS  (STANDING):  atorvastatin 80 milliGRAM(s) Oral at bedtime  chlorhexidine 4% Liquid 1 Application(s) Topical <User Schedule>  furosemide   Injectable 20 milliGRAM(s) IV Push daily  metoprolol tartrate 50 milliGRAM(s) Oral two times a day    REVIEW OF SYSTEMS:    CONSTITUTIONAL: No fever, weight loss, chills, shakes, or fatigue  EYES: No eye pain, visual disturbances, or discharge  ENMT:  No difficulty hearing, tinnitus, vertigo; No sinus or throat pain  NECK: No pain or stiffness  BREASTS: No pain, masses, or nipple discharge  RESPIRATORY: No cough, wheezing, hemoptysis, or shortness of breath  CARDIOVASCULAR: No chest pain, dyspnea, palpitations, dizziness, syncope, paroxysmal nocturnal dyspnea, orthopnea, or arm or leg swelling  GASTROINTESTINAL: No abdominal  or epigastric pain, nausea, vomiting, hematemesis, diarrhea, constipation, melena or bright red blood.  GENITOURINARY: No dysuria, nocturia, hematuria, or urinary incontinence  NEUROLOGICAL: No headaches, memory loss, slurred speech, limb weakness, loss of strength, numbness, or tremors  SKIN: No itching, burning, rashes, or lesions   LYMPH NODES: No enlarged glands  ENDOCRINE: No heat or cold intolerance, or hair loss  MUSCULOSKELETAL: No joint pain or swelling, muscle, back, or extremity pain  PSYCHIATRIC: No depression, anxiety, or difficulty sleeping  HEME/LYMPH: No easy bruising or bleeding gums  ALLERY AND IMMUNOLOGIC: No hives or rash.      Vital Signs Last 24 Hrs  T(C): 36.8 (2022 08:39), Max: 36.8 (2022 08:39)  T(F): 98.2 (2022 08:39), Max: 98.2 (2022 08:39)  HR: 86 (2022 11:05) (52 - 90)  BP: 117/71 (2022 08:39) (110/65 - 117/71)  BP(mean): --  RR: 18 (2022 08:39) (18 - 19)  SpO2: 100% (2022 08:39) (99% - 100%)    PHYSICAL EXAM:    GENERAL: In no apparent distress, well nourished, and hydrated.  HEAD:  Atraumatic, Normocephalic  EYES: EOMI, PERRLA, conjunctiva and sclera clear  ENMT: No tonsillar erythema, exudates, or enlargement; Moist mucous membranes, Good dentition, No lesions  NECK: Supple and normal thyroid.  No JVD or carotid bruit.  Carotid pulse is 2+ bilaterally.  HEART: Regular rate and rhythm; No murmurs, rubs, or gallops.  PULMONARY: Clear to auscultation and perfusion.  No rales, wheezing, or rhonchi bilaterally.  ABDOMEN: Soft, Nontender, Nondistended; Bowel sounds present  EXTREMITIES:  2+ Peripheral Pulses, No clubbing, cyanosis, or edema  LYMPH: No lymphadenopathy noted  NEUROLOGICAL: Grossly nonfocal          INTERPRETATION OF TELEMETRY:    ECG:    I&O's Detail    2022 07:01  -  2022 07:00  --------------------------------------------------------  IN:  Total IN: 0 mL    OUT:    Voided (mL): 600 mL  Total OUT: 600 mL    Total NET: -600 mL      2022 07:01  -  2022 11:45  --------------------------------------------------------  IN:    Oral Fluid: 120 mL  Total IN: 120 mL    OUT:    Voided (mL): 200 mL  Total OUT: 200 mL    Total NET: -80 mL          LABS:                        16.4   16.53 )-----------( 226      ( 2022 11:09 )             47.0     04-17    136  |  103  |  34<H>  ----------------------------<  116<H>  4.1   |  27  |  1.06    Ca    8.9      2022 07:59    TPro  7.3  /  Alb  3.4  /  TBili  1.9<H>  /  DBili  x   /  AST  31  /  ALT  36  /  AlkPhos  101  04-17    BNP  I&O's Detail    2022 07:01  -  2022 07:00  --------------------------------------------------------  IN:  Total IN: 0 mL    OUT:    Voided (mL): 600 mL  Total OUT: 600 mL    Total NET: -600 mL      2022 07:01  -  2022 11:45  --------------------------------------------------------  IN:    Oral Fluid: 120 mL  Total IN: 120 mL    OUT:    Voided (mL): 200 mL  Total OUT: 200 mL    Total NET: -80 mL        Daily     Daily Weight in k.2 (2022 06:37)    RADIOLOGY & ADDITIONAL STUDIES:

## 2022-04-18 NOTE — CONSULT NOTE ADULT - REASON FOR ADMISSION
Left sided weakness

## 2022-04-18 NOTE — CONSULT NOTE ADULT - CONSULT REQUESTED DATE/TIME
12-Apr-2022 11:09
11-Apr-2022 10:42
14-Apr-2022
16-Apr-2022 12:21
18-Apr-2022 11:42
11-Apr-2022 15:10
12-Apr-2022 12:00
15-Apr-2022 16:35

## 2022-04-18 NOTE — DISCHARGE NOTE PROVIDER - CARE PROVIDERS DIRECT ADDRESSES
[email or web address]

## 2022-04-18 NOTE — PROGRESS NOTE ADULT - SUBJECTIVE AND OBJECTIVE BOX
Patient is a 62y old  Male who presents with a chief complaint of Left sided weakness (14 Apr 2022 16:35)    4/15- lethargic but arousable , denies HA   tele shows rate controlled afib     4/18 examined at bedsite, still feels fatigue, off Dig secondary to elevated Dig level    MEDICATIONS  (STANDING):  atorvastatin 80 milliGRAM(s) Oral at bedtime  chlorhexidine 4% Liquid 1 Application(s) Topical <User Schedule>  furosemide   Injectable 20 milliGRAM(s) IV Push daily  metoprolol tartrate 50 milliGRAM(s) Oral two times a day    MEDICATIONS  (PRN):            Vital Signs Last 24 Hrs  T(C): 36.8 (15 Apr 2022 04:00), Max: 36.9 (14 Apr 2022 21:24)  T(F): 98.2 (15 Apr 2022 04:00), Max: 98.5 (14 Apr 2022 21:24)  HR: 99 (15 Apr 2022 04:00) (90 - 111)  BP: 132/78 (15 Apr 2022 04:00) (108/78 - 132/78)  BP(mean): 88 (15 Apr 2022 04:00) (88 - 88)  RR: 18 (15 Apr 2022 04:00) (18 - 19)  SpO2: 96% (15 Apr 2022 04:00) (92% - 98%)            INTERPRETATION OF TELEMETRY:    ECG:        LABS:                        16.0   13.90 )-----------( 180      ( 14 Apr 2022 08:05 )             45.1     04-14    135  |  105  |  18  ----------------------------<  107<H>  3.7   |  24  |  0.94    Ca    8.9      14 Apr 2022 08:05              I&O's Summary    14 Apr 2022 07:01  -  15 Apr 2022 07:00  --------------------------------------------------------  IN: 0 mL / OUT: 300 mL / NET: -300 mL      BNP  RADIOLOGY & ADDITIONAL STUDIES:

## 2022-04-18 NOTE — DISCHARGE NOTE PROVIDER - NSDCMRMEDTOKEN_GEN_ALL_CORE_FT
atorvastatin 80 mg oral tablet: 1 tab(s) orally once a day (at bedtime)  furosemide 100 mg/100 mL-0.9% intravenous solution: 20 milliliter(s) intravenous once a day  metoprolol tartrate 50 mg oral tablet: 1 tab(s) orally 2 times a day   atorvastatin 80 mg oral tablet: 1 tab(s) orally once a day (at bedtime)  digoxin 125 mcg (0.125 mg) oral tablet: 1 tab(s) orally once a day  furosemide 100 mg/100 mL-0.9% intravenous solution: 20 milliliter(s) intravenous once a day  metoprolol tartrate 50 mg oral tablet: 1 tab(s) orally 2 times a day   apixaban 5 mg oral tablet: 1 tab(s) orally every 12 hours  atorvastatin 80 mg oral tablet: 1 tab(s) orally once a day (at bedtime)  digoxin 125 mcg (0.125 mg) oral tablet: 1 tab(s) orally once a day  metoprolol tartrate 50 mg oral tablet: 1 tab(s) orally 2 times a day  sacubitril-valsartan 24 mg-26 mg oral tablet: 1 tab(s) orally 2 times a day  spironolactone 25 mg oral tablet: 1 tab(s) orally once a day

## 2022-04-18 NOTE — CONSULT NOTE ADULT - CONSULT REQUESTED BY NAME
Dr. Alegre
Hospitalist
Medicine team
Dr. Rober Scott
Jamie Lozano
REZA Colbert
Jamie Lozano
Jamie Lozano

## 2022-04-18 NOTE — DISCHARGE NOTE PROVIDER - HOSPITAL COURSE
Patient is a 63 y/o/m w/ PMHx, admitted in the ED 4/11/22 s/p fall for LE weakness. Patient states at 5:30 am this morning he got up to turn off the TV when his leg gave way and he fell. Pt's sister lives downstairs and heard the fall; she went upstairs and helped him up to a chair, but then pt got up and fell again. Pt's sister was concerned he was having a stroke so she called EMS. s/p code stroke in the ED.      In ED the patient was evaluated, and code stroke was called. CTH showed no acute infarct or hemorrhage. CTA head and neck showed no LVO or stenosis or aneurysms. CT perfusion showed no core infarct. IV TPA was not given because LKN was last night, NIHSS 4-5. Pt was given 2 baby ASA. He was found to have new onset Afib in ED, and upon my evaluation his BP was elevated at 156/118.    Medical progress: Patient denies any HA, change of vision, ne motor or sensory issues. Patient feels well this am. tele monitoring -  poor rhythm control (Hrs 80s - 120s), highest 170s this am (burst)  Complaints: no new complaints  State of mind: normal  Events: patient had a fall -  4/17, required 4 stiched on the (L) orbital region    Physical Exam:   GENERAL APPEARANCE:  very deconditioned and frail  T(C): 36.8 (04-18-22 @ 08:39), Max: 36.8 (04-18-22 @ 08:39)  HR: 60 (04-18-22 @ 08:39) (52 - 90)  BP: 117/71 (04-18-22 @ 08:39) (110/65 - 130/86)  RR: 18 (04-18-22 @ 08:39) (18 - 19)  SpO2: 100% (04-18-22 @ 08:39) (99% - 100%)  HEENT:  (L) sided laceration  Skin:  Warm and dry without any rash   NECK:  Supple without lymphadenopathy.   HEART:  Regular rate and rhythm. normal S1 and S2, No M/R/G  LUNGS:  Good ins/exp effort, no W/R/R/C  ABDOMEN:  Soft, nontender, nondistended with good bowel sounds heard  EXTREMITIES:  Without cyanosis, clubbing or edema.   NEUROLOGICAL:  slight flattening of left nasolabial fold, mild dysarthria, + left pronator drift, 4/5 in LUE, 4-/5 in LLE, Left leg drift, 5/5 in RUE and RLE. coordination: +dysmetria on left finger to nose       Patient is a 61 y/o/m w/ PMHx, admitted in the ED 4/11/22 s/p fall for LE weakness. Patient states at 5:30 am this morning he got up to turn off the TV when his leg gave way and he fell. Pt's sister lives downstairs and heard the fall; she went upstairs and helped him up to a chair, but then pt got up and fell again. Pt's sister was concerned he was having a stroke so she called EMS. s/p code stroke in the ED.      In ED the patient was evaluated, and code stroke was called. CTH showed no acute infarct or hemorrhage. CTA head and neck showed no LVO or stenosis or aneurysms. CT perfusion showed no core infarct. IV TPA was not given because LKN was last night, NIHSS 4-5. Pt was given 2 baby ASA. He was found to have new onset Afib in ED, and upon my evaluation his BP was elevated at 156/118.    Medical progress: Patient denies any HA, change of vision, ne motor or sensory issues. Patient feels well this am. tele monitoring -  poor rhythm control (Hrs 80s - 120s), highest 170s this am (burst)  Complaints: no new complaints  State of mind: normal  Events: patient had a fall -  4/17, required 4 stiched on the (L) orbital region    Physical Exam:   GENERAL APPEARANCE:  very deconditioned and frail  T(C): 36.8 (04-18-22 @ 08:39), Max: 36.8 (04-18-22 @ 08:39)  HR: 60 (04-18-22 @ 08:39) (52 - 90)  BP: 117/71 (04-18-22 @ 08:39) (110/65 - 130/86)  RR: 18 (04-18-22 @ 08:39) (18 - 19)  SpO2: 100% (04-18-22 @ 08:39) (99% - 100%)  HEENT:  (L) sided laceration  Skin:  Warm and dry without any rash   NECK:  Supple without lymphadenopathy.   HEART:  Regular rate and rhythm. normal S1 and S2, No M/R/G  LUNGS:  Good ins/exp effort, no W/R/R/C  ABDOMEN:  Soft, nontender, nondistended with good bowel sounds heard  EXTREMITIES:  Without cyanosis, clubbing or edema.   NEUROLOGICAL:  slight flattening of left nasolabial fold, mild dysarthria, + left pronator drift, 4/5 in LUE, 4-/5 in LLE, Left leg drift, 5/5 in RUE and RLE. coordination: +dysmetria on left finger to nose       Patient is a 63 y/o/m w/ PMHx, admitted in the ED 4/11/22 s/p fall for LE weakness. Patient states at 5:30 am this morning he got up to turn off the TV when his leg gave way and he fell. Pt's sister lives downstairs and heard the fall; she went upstairs and helped him up to a chair, but then pt got up and fell again. Pt's sister was concerned he was having a stroke so she called EMS. s/p code stroke in the ED.      In ED the patient was evaluated, and code stroke was called. CTH showed no acute infarct or hemorrhage. CTA head and neck showed no LVO or stenosis or aneurysms. CT perfusion showed no core infarct. IV TPA was not given because LKN was last night, NIHSS 4-5. Pt was given 2 baby ASA. He was found to have new onset Afib in ED, and upon my evaluation his BP was elevated at 156/118.    Medical progress: Denies any HA, CP, SOB. No fevers, chills or shakes. Labs and vitals reviewed.   Complaints: feeling weak  State of mind: normal    I have spent extensive time with patient's sister Nancy - educating /  going   over patient's needs. Nancy feels overwhelmed - as patient is unable to afford rehab as he is a great candidate for rehab.     Physical Exam:   GENERAL APPEARANCE:  very deconditioned and frail  ICU Vital Signs Last 24 Hrs  T(C): 36.5 (27 Apr 2022 21:42), Max: 36.5 (27 Apr 2022 21:42)  T(F): 97.7 (27 Apr 2022 21:42), Max: 97.7 (27 Apr 2022 21:42)  HR: 76 (28 Apr 2022 08:30) (52 - 76)  BP: 99/57 (27 Apr 2022 21:42) (99/57 - 103/66)  RR: 18 (27 Apr 2022 21:42) (18 - 19)  SpO2: 98% (27 Apr 2022 21:42) (98% - 100%)  HEENT:  (L) sided laceration  Skin:  Warm and dry without any rash   NECK:  Supple without lymphadenopathy.   HEART:  Regular rate and rhythm. normal S1 and S2, No M/R/G  LUNGS:  Good ins/exp effort, no W/R/R/C  ABDOMEN:  Soft, nontender, nondistended with good bowel sounds heard  EXTREMITIES:  Without cyanosis, clubbing or edema.   NEUROLOGICAL:  slight flattening of left nasolabial fold, mild dysarthria, + left pronator drift, 4/5 in LUE, 4-/5 in LLE, Left leg drift, 5/5 in RUE and RLE. coordination: +dysmetria on left finger to nose

## 2022-04-18 NOTE — PROGRESS NOTE ADULT - ASSESSMENT
A/P: 62 yr old M with no PMHX was brought to ED by EMS on 4/11/22 s/p fall for LE weakness.     Atrial fibrillation.   suboptimally rate controlled. HR is   Off AC secondary to hemorrhagic transformation.   Rate control will recheck Dig level and restart if ok  CHF. Severely reduced LVEF of 15%- unknown etiology. Will hold off on ACEI for now and will increase betablocker to better rate control. Will eventually need a cath or perfusion study with viability when clinical status improves   Rate control a priority at present

## 2022-04-18 NOTE — PROGRESS NOTE ADULT - ASSESSMENT
ASSESSMENT/PLAN  62yMale with functional deficits after  Pain - Tylenol  DVT PPX - SCDs  Rehab -    Recommend ACUTE inpatient rehabilitation for the functional deficits consisting of 3 hours of therapy/day & 24 hour RN/daily PMR physician for comorbid medical management when medically stable Patient will be able to tolerate 3 hours a day.  Patient has an elevated white count. Please consider a work  up for elevated WBC. cxray, U/A, doppler repeat wbc. PCR?  Thanks    Will continue to follow. Functional progress will determine ongoing rehab dispo recommendations, which may change.    Continue bedside therapy as well as OOB throughout the day with mobilization by staff to maintain cardiopulmonary function and prevention of secondary complications related to debility.        ASSESSMENT/PLAN  62yMale with functional deficits after  Pain - Tylenol  DVT PPX - SCDs  Rehab -    Recommend ACUTE inpatient rehabilitation for the functional deficits consisting of 3 hours of therapy/day & 24 hour RN/daily PMR physician for comorbid medical management when medically stable Patient will be able to tolerate 3 hours a day.  Patient has an elevated white count.  U/a is negative. No palpapable cord in calves.  PCR negative  Work up is in progress for elevated heart rate.  Will continue to follow. Functional progress will determine ongoing rehab dispo recommendations, which may change.    Continue bedside therapy as well as OOB throughout the day with mobilization by staff to maintain cardiopulmonary function and prevention of secondary complications related to debility.

## 2022-04-18 NOTE — CONSULT NOTE ADULT - ASSESSMENT
62 yr old M with no PMHX was brought to ED by EMS on 4/11/22 s/p fall for LE weakness. Patient states at 5:30 am this morning he got up to turn off the TV when his leg gave way and he fell. Pt's sister lives downstairs and heard the fall; she went upstairs and helped him up to a chair, but then pt got up and fell again. Pt's sister was concerned he was having a stroke so she called EMS. CTH showed no acute infarct or hemorrhage. CTA head and neck showed no LVO or stenosis or aneurysms. CT perfusion showed no core infarct. IV TPA was not given because LKN was last night, NIHSS 4-5. Pt was given 2 baby ASA. He was found to have new onset Afib in ED, and upon my evaluation his BP was elevated at 156/118.  MRI Brain this morning revealed Acute right CVA, with hemorrhagic conversion    Plan:  - No acute neurosurgical intervention  - Transfer to ICU  - Neuro checks Q1hour  - HCT in 4 hours or sooner if change in mental status  - Hold aspirin  - SBP <150  - NPO for now  - SCDs DVT ppx    Discussed with Dr. Burkett
A/P: 62 yr old M with no PMHX was brought to ED by EMS on 4/11/22 s/p fall for LE weakness, admitted for CVA complicated by hemorrhagic conversion and new onset afib.    New onset Atrial fibrillation. HR uncontrolled but improved from prior. Medications limitations due to patients current bleeding status and severely reduced LVSF. AC off at this time due to hemorrhagic transformation.  Dig held due to elevated level.  Plan to recheck level and reinitiate if level appropriate.  Continue betablocker titration for improved rate control as ACEi also held due to renal dysfunction.  Patient will require ischemia evaluation/cath when clinically appropriate.
A/P: 62 yr old M with no PMHX was brought to ED by EMS on 4/11/22 s/p fall for LE weakness.     1. Atrial fibrillation. Unknown h/o afib. In setting of CVA. Received lovenox dose in ER.   In setting of acute CVA, will hold off on anticoagulation until after MRI- d/w neuro.   Will start Bbl for HR/BP control.   Possible ALFREDO/CV when more stable. Will start with a rate control strategy for now.  Will need to assess LV fxn with 2Decho.     2. CHF. Poor access to medical care. CXR with mild to moderate CHF. 2Decho pending.  Start IV lasix. Check BNP. Start Bbl.     3. +Troponins. Likely demand in setting of CVA, afib, CHF.   Medical mgmt as above.     4. DVT proph.
62 yr old M with no PMHX was brought to ED by EMS on 4/11/22 s/p fall for LE weakness. Patient states at 5:30 am this morning he got up to turn off the TV when his leg gave way and he fell. Pt's sister lives downstairs and heard the fall; she went upstairs and helped him up to a chair, but then pt got up and fell again. Pt's sister was concerned he was having a stroke so she called EMS.     In ED the patient was evaluated, and code stroke was called. CTH showed no acute infarct or hemorrhage. CTA head and neck showed no LVO or stenosis or aneurysms. CT perfusion showed no core infarct. IV TPA was not given because LKN was last night, NIHSS 4. Pt was given 2 baby ASA. He was found to have new onset Afib in ED, and upon my evaluation his BP was elevated at 156/118.    #Acute right CVA, ?embolic d/t new onset Afib. Patient also with uncontrolled HTN    - MRI head ordered  - ASA 81 mg QD  - Hgb A1c within 24 hours  - Cardiology eval for new onset Afib, to determine AC  - Monitor HTN- maintain systolic bp 160-190, no less than 120  - Neuro checks Q4h  - Vital signs Q4h  - Blood glucose checks Q6h for 1st 24hrs  - Echo  - PT eval  - DVT prophylaxis  - Telemonitoring    #Carotid artery atherosclerosis    -Atorvastatin, lipid profile within 24 hrs      Patient was discussed with Dr. Dee    
ASSESSMENT/PLAN  62yMale with functional deficits after  Pain - Tylenol  DVT PPX - SCDs  Rehab -    Recommend ACUTE inpatient rehabilitation for the functional deficits consisting of 3 hours of therapy/day & 24 hour RN/daily PMR physician for comorbid medical management when medically stable Patient will be able to tolerate 3 hours a day.  Patient has an elevated white count. Please consider a work  up for elevated WBC. cxray, U/A, doppler repeat wbc. PCR?  Thanks    Will continue to follow. Functional progress will determine ongoing rehab dispo recommendations, which may change.    Continue bedside therapy as well as OOB throughout the day with mobilization by staff to maintain cardiopulmonary function and prevention of secondary complications related to debility.       
62 yr old M with no PMHX was brought to ED by EMS on 4/11/22 s/p fall for LE weakness. Patient states at 5:30 am this morning he got up to turn off the TV when his leg gave way and he fell. Pt's sister lives downstairs and heard the fall; she went upstairs and helped him up to a chair, but then pt got up and fell again. Pt's sister was concerned he was having a stroke so she called EMS. In ED the patient was evaluated, and code stroke was called. Imaging showed acute R CVA with hemorrhagic transformation. He was found to have new onset Afib in ED. Has elevated wbc ct 15, infectious workup unremarkable so far.     1. leukocytosis. acute R CVA  - ua (-), xray clear from 4/15, f/u ct head stable  - wbc ct could be reactive ? microaspiration   - if continues to increase, repeat imaging, check blood cx  - observe off antibiotics for now  - fu cbc  - monitor temps  - supportive care  - neurology f/u noted    2. other issues - care per medicine     
  Process of Care  --Reviewed dx/treatment problems and alignment with Goals of Care    Acute right CVA, with hemorrhagic transformation. ? embolic d/t new onset Afib. Patient also with uncontrolled HTN  Repeat Head CT on 4/14 shows more visible hemorrhagic transformation   Pt not on AC secondary to hemorrhagic transformation.   No surgical intervention at this time.  Continue to hold AC and anti-platelet therapy.   Repeat head CT on 4/16   Keep SBP < 140   Continue statin    New onset a-fib  EF on echo - 15%.  Have to hold AC for now  Cardiology is following.      CHF.   Severely reduced LVEF of 15%- unknown etiology.   Will hold off on ACEI for now and will increase betablocker to better rate control  eventually need a cath or perfusion study with viability      elevated Troponin Likely demand in setting of CVA, afib   aas per primary        Physical Aspects of Care  --Pain  patient denies at this time  c/w current management    --Bowel Regimen  denies constipation  risk for constipation d/t immobility  daily dulcolax    --Dyspnea  No SOB at this time  comfortable and in NAD    --Nausea Vomiting  denies    --Weakness  PT as tolerated     Psychological and Psychiatric Aspects of Care:   --Greif/Bereavment: emotional support provided  --Hx of psychiatric dx: none  -Pastoral Care Available PRN     Social Aspects of Care  -SW involved     Cultural Aspects  -Primary Language: English    Goals of Care:     We discussed Palliative Care team being a supportive team when a patient has ongoing illnesses.  We also discussed that it is not an end of life care service, but can help navigate symptoms and emotional support througout their hospital stay here.      as per goc note      Ethical and Legal Aspects:   NA    Capacity: pt w/ capacity for complex goc  HCP/Surrogate: Nancy  Code Status: FULL CODE  MOLST:FULL CODE  Dispo Plan: dc to [facility]    Discussed With: Case coordinated with attending and SW and RN     Time Spent: 90 minutes including the care, coordination and counseling of this patient, 50% of which was spent coordinating and counseling.

## 2022-04-18 NOTE — PROGRESS NOTE ADULT - ASSESSMENT
62 yr old M with no PMHX was brought to ED by EMS on 4/11/22 s/p fall for LE weakness. Patient states at 5:30 am this morning he got up to turn off the TV when his leg gave way and he fell. Pt's sister lives downstairs and heard the fall; she went upstairs and helped him up to a chair, but then pt got up and fell again. Pt's sister was concerned he was having a stroke so she called EMS.     In ED the patient was evaluated, and code stroke was called. CTH showed no acute infarct or hemorrhage. CTA head and neck showed no LVO or stenosis or aneurysms. CT perfusion showed no core infarct. IV TPA was not given because LKN was last night, NIHSS 4-5. Pt was given 2 baby ASA. He was found to have new onset Afib in ED, and upon my evaluation his BP was elevated at 156/118.    #Acute right basal ganglia CVA, with hemorrhagic transformation. ? embolic d/t new onset Afib. Patient also with uncontrolled HTN    -No surgical intervention at this time.  -Continue to hold AC and anti-platelet therapy  -Neurosurgery is following  -Keep SBP < 140  -Continue statin    # New onset a-fib  -EF on echo ~ 15%.  -Have to hold AC for now  -Cardiology is following.    Patient was discussed with Dr. Villagomez

## 2022-04-18 NOTE — DISCHARGE NOTE PROVIDER - NSDCFUSCHEDAPPT_GEN_ALL_CORE_FT
Dandre Roy  [facility] Physician [facility]  [employer] [address]  Scheduled Appointment: 05/09/2022

## 2022-04-18 NOTE — DISCHARGE NOTE PROVIDER - NSDCCPCAREPLAN_GEN_ALL_CORE_FT
PRINCIPAL DISCHARGE DIAGNOSIS  Diagnosis: Stroke  Assessment and Plan of Treatment: # Acute right CVA, with hemorrhagic transformation. ? embolic d/t new onset Afib  - Repeat Head CT on 4/14 shows more visible hemorrhagic transformation   - Pt not on AC secondary to hemorrhagic transformation.   - Keep SBP < 140  - Continue statin / hold AC and anti-platelet therapy        SECONDARY DISCHARGE DIAGNOSES  Diagnosis: Atrial fibrillation  Assessment and Plan of Treatment: # New onset a-fib (patient poor rhythm control with good HR control)  - d/c dig as patient's dig level is elevated  - EF on echo - 15%.  - Have to hold AC for now  - Cardiology is following.    Diagnosis: Acute on chronic systolic congestive heart failure  Assessment and Plan of Treatment: # Severely reduced LVEF of 15%.  this could be secondary to reversible cardiac myopathy secondary to poorly controlled tachyarrhythmias  - continue with metoprolol  - continue with lasix    Diagnosis: Leukocytosis  Assessment and Plan of Treatment: - Leukocytosis in a setting of acute R CVA  - ua (-), xray clear from 4/15   - wbc ct could be reactive ? microaspiration   - observe off antibiotics for now  - monitor temps  - supportive care       PRINCIPAL DISCHARGE DIAGNOSIS  Diagnosis: Stroke  Assessment and Plan of Treatment: # Acute right CVA, with hemorrhagic transformation. ? embolic d/t new onset Afib  - Repeat Head CT on 4/14 shows more visible hemorrhagic transformation   - Pt not on AC secondary to hemorrhagic transformation.   - Keep SBP < 140  - Continue statin / hold AC and anti-platelet therapy        SECONDARY DISCHARGE DIAGNOSES  Diagnosis: Atrial fibrillation  Assessment and Plan of Treatment: # New onset a-fib (patient poor rhythm control with good HR control)  - close monitoring on dig as patient has had elevated dig post dig loading  - EF on echo - 15%.  - Have to hold AC for now  - Cardiology is following.    Diagnosis: Acute on chronic systolic congestive heart failure  Assessment and Plan of Treatment: # Severely reduced LVEF of 15%.  this could be secondary to reversible cardiac myopathy secondary to poorly controlled tachyarrhythmias  - continue with metoprolol  - continue with lasix    Diagnosis: Leukocytosis  Assessment and Plan of Treatment: - Leukocytosis in a setting of acute R CVA  - ua (-), xray clear from 4/15   - wbc ct could be reactive ? microaspiration   - observe off antibiotics for now  - monitor temps  - supportive care       PRINCIPAL DISCHARGE DIAGNOSIS  Diagnosis: Stroke  Assessment and Plan of Treatment: # Acute right CVA, with hemorrhagic transformation. ? embolic d/t new onset Afib  - your stroke has been related to your underlying arrhythmia called atrial fibrillation   - if there is a change in mental status - please come back in the hospital  - you are on blood thinners, to prevent further strokes  - continue statin   - Patient currently is not able to perform physical / mental duties required for his current job - till cleared by primary care physician        SECONDARY DISCHARGE DIAGNOSES  Diagnosis: Atrial fibrillation  Assessment and Plan of Treatment: # New onset a-fib (patient poor rhythm control with good HR control)  - continue with digoxin  - continue with metoprolol  - EF on echo - 15%.  - Eliquis 5 mg po q12h -  this medication   - Cardiology is following -  Care discussed with Dr. Raza who will closely follow up with patient post discharge    Diagnosis: Acute on chronic systolic congestive heart failure  Assessment and Plan of Treatment: # Severely reduced LVEF of 15%.  this could be secondary to reversible cardiac myopathy secondary to poorly controlled tachyarrhythmias  - continue with metoprolol  - continue enteresto  - continue with spirinolactone

## 2022-04-18 NOTE — DISCHARGE NOTE PROVIDER - CARE PROVIDER_API CALL
Oleg Levin)  Cardiovascular Disease; Nuclear Cardiology  [address] [place] [address]  Phone: [phone]  Fax: [phone]  Follow Up Time:     Zoe Dee)  Clinical Neurophysiology; EEGEpilepsy; Neurology; Sleep Medicine  [address] [facility], [address]  [place] [address]  Phone: [phone]  Fax: [phone]  Follow Up Time:

## 2022-04-18 NOTE — CONSULT NOTE ADULT - PROVIDER SPECIALTY LIST ADULT
Electrophysiology
Cardiology
MICU
Neurology
Neurosurgery
Physiatry
Infectious Disease
Palliative Care

## 2022-04-18 NOTE — CONSULT NOTE ADULT - CONSULT REASON
left sided weakness
CVA w/ hemorrhagic conversion
Evaluate for Acute Rehabilitation
stroke hemorrhagic conversion
leukocytosis
Rate control, renal failure on dig
CVA, afib
new stroke

## 2022-04-18 NOTE — DISCHARGE NOTE PROVIDER - NSDCFUADDAPPT_GEN_ALL_CORE_FT
Folow up cj made with Dr. Roy for Monday May 9th at 3:30.    Follow up cj made with Dr. Dee for Wednesday August 17th at 4:20.  office will call pt if they have a earlier cj.

## 2022-04-18 NOTE — PROGRESS NOTE ADULT - SUBJECTIVE AND OBJECTIVE BOX
Interval History:  22: Patient had taken a fall yesterday. Has no new complaints.  CTH was repeated after fall, no bleed    MEDICATIONS  (STANDING):  atorvastatin 80 milliGRAM(s) Oral at bedtime  chlorhexidine 4% Liquid 1 Application(s) Topical <User Schedule>  furosemide   Injectable 20 milliGRAM(s) IV Push daily  metoprolol tartrate 50 milliGRAM(s) Oral two times a day    Allergies: No Known Allergies    PHYSICAL EXAM:  Vital Signs Last 24 Hrs  T(C): 36.8 (2022 08:39), Max: 36.8 (2022 08:39)  T(F): 98.2 (2022 08:39), Max: 98.2 (2022 08:39)  HR: 86 (2022 11:05) (52 - 90)  BP: 117/71 (2022 08:39) (110/65 - 117/71)  BP(mean): --  RR: 18 (2022 08:39) (18 - 19)  SpO2: 100% (2022 08:39) (99% - 100%)    GENERAL: NAD, well-groomed  HEAD:  Atraumatic, Normocephalic  Neuro:  Awake, alert, no aphasia  CN: PERRL, EOMI, no nystagmus, slight flattening of left nasolabial fold, mild dysarthria, tongue protrudes in the midline  motor: + left pronator drift, 4/5 in LUE, 4-/5 in LLE, Left leg drift, 5/5 in RUE and RLE  sensory: intact to light touch  coordination: +dysmetria on left finger to nose  DTRs: symmetric, plantar responses flexor bilaterally    NIHSS: 5      LABS:                        16.0   13.90 )-----------( 180      ( 2022 08:05 )             45.1     04-14    135  |  105  |  18  ----------------------------<  107<H>  3.7   |  24  |  0.94    Ca    8.9      2022 08:05        Urinalysis Basic - ( 15 Apr 2022 10:45 )    Color: Yellow / Appearance: Clear / S.020 / pH: x  Gluc: x / Ketone: Trace  / Bili: Negative / Urobili: Negative   Blood: x / Protein: Negative / Nitrite: Negative   Leuk Esterase: Negative / RBC: x / WBC x   Sq Epi: x / Non Sq Epi: x / Bacteria: x      RADIOLOGY & ADDITIONAL STUDIES:  < from: CT Head No Cont (22 @ 10:53) >  IMPRESSION:  Evolving right basal ganglia region hemorrhagic infarct without   significant change from the prior exam.    No new intracranial hemorrhage.    < end of copied text >    CT Head No Cont (22 @ 08:19) >    IMPRESSION:    Slightly increased conspicuity of hemorrhagic transformation of right   basal ganglia acute infarction. Similar regional mass effect.    Similar-appearing acute infarction involving the right inferolateral   frontal lobe and right mid corona radiata.    No midline shift. No hydrocephalus.    < end of copied text >    CT Angio Neck w/ IV Cont (22 @ 07:42) >    IMPRESSION:  CT brain perfusion: Apparent increased mean transit time in the bilateral   temporal lobes, more on the right, and right frontal lobe, as well as in   the bilateral cerebellum, may be artifactual. Consider further evaluation   with MRI.    CTA head and neck:    -Moderate atherosclerotic calcifications along the bilateral carotid   bifurcations without significant stenosis. The internal carotid arteries   are patent without significant stenosis. Dense atherosclerotic   calcifications along the bilateral cavernous segments without significant   stenosis.  -Patent cervical and intracranial major arterial vasculature without   evidence of abrupt vessel cut off, hemodynamically significant stenosis,   aneurysm, or dissection.  -Bilateral pleural effusions.    CT Brain Stroke Protocol (22 @ 07:31) >    IMPRESSION:  No acute intracranial hemorrhage or mass effect.    MRI head 22:  Acute infarction within the anterior RIGHT basal ganglia   and straightening restricted diffusion and central hemorrhage measuring   2.4 cm, with mass effect on the anterior horn of the RIGHT lateral   ventricle. Smaller acute infarctions involve the anterior inferior RIGHT   frontal lobe, the posterior RIGHT basal ganglia and posterior RIGHT   insular cortex as well as the posterior RIGHT temporal on lateral RIGHT   occipital cortex,  with restricted diffusion.    Echo 22:     Moderate (2+) mitral regurgitation is present.   Normal aortic valve structure and function.   Moderate (2+) tricuspid valve regurgitation is present.   The left ventricle cavity is mildly dilated.   Estimated left ventricular ejection fraction is 15 %.   Severe, diffuse hypokinesis of the left ventricle is present.   The right ventricle exhibits mild diffuse hypokinesis, and mild   depression   of contractility.   Pleural effusion - is present.      CT head 22:  Slightly increased conspicuity of hemorrhagic transformation of right   basal ganglia acute infarction. Similar regional mass effect.    Similar-appearing acute infarction involving the right inferolateral   frontal lobe and right mid corona radiata.    No midline shift. No hydrocephalus.

## 2022-04-18 NOTE — DISCHARGE NOTE PROVIDER - DETAILS OF MALNUTRITION DIAGNOSIS/DIAGNOSES
This patient has been assessed with a concern for Malnutrition and was treated during this hospitalization for the following Nutrition diagnosis/diagnoses:     -  04/15/2022: Severe protein-calorie malnutrition

## 2022-04-18 NOTE — CHART NOTE - NSCHARTNOTEFT_GEN_A_CORE
HPI:  62 yr old M with no PMHX was brought to ED by EMS on 4/11/22 s/p fall for LE weakness. Patient states at 5:30 am this morning he got up to turn off the TV when his leg gave way and he fell.  (11 Apr 2022 12:00)      PERTINENT PMH REVIEWED:  [ X ] YES [ ] NO           Primary Contact:         Sister Nancy [phone]    HCP [  ] Surrogate [  X ] Guardian [   ]    Mental Status: [ X ] Alert  [  X ] Oriented [  ] Confused [  ] Lethargic [  ]  Concerns of Depression [  ]- denies, just reports being tired due to lack of sleep   Anxiety [   ]- denies feelings anxious  Baseline ADLs (prior to admission):  Independent [  ] moderately [ X ] fully   Dependent   [ ] moderately [ ]fully    Family Meeting: GO discussion took place on 4/15     Anticipated Grief: Patient [ X ] Family [  ]    Caregiver [place] Assessed: Yes [ X ] No [  ]    Rastafarian: Unknown    Spiritual Concerns: Not identified     Goals of Care: Comfort [  ] Rehabilitation [  X ] Curative [  ] Life Prolonging [  ]    Previous Services: None    ADVANCE DIRECTIVES:  Pt. is Full Code    Anticipated D/C Plan: Acute Rehab                      Summary:    This SW met with pt. at bedside to follow up and provide support. Role of Palliative  explained. Pt. was residing home with his sister prior to admission and was independent. He shared that he had a stroke which is what brought him into the hospital. He discussed how strokes have run in his family and his father and one of his brothers also had one. He discussed how he is managing ok. Feelings explored and support provided. Pt. reports his plan is to go to acute rehab and is hoping to get there soon. GOC conversation took place with Dr. Tolbert last week and no limits set at this time. Plan for Acute rehab upon d/c. Our team will sign off as goals of care are clear and pt. is not symptomatic.

## 2022-04-18 NOTE — DISCHARGE NOTE PROVIDER - NSDCFUADDINST_GEN_ALL_CORE_FT
Has lost 13 pounds intentionally.    Decrease soliqua to 8 units in PM.      Wants to check sugars 8x/day.    Get BP cuff.  Keep a diary to review at upcoming visit.    Discussed with patient.     1) It is extremely important that you follow up with doctors  2) close monitoring of blood pressure at home  3) close following with primary care physician mauro Blanco\

## 2022-04-18 NOTE — PROGRESS NOTE ADULT - SUBJECTIVE AND OBJECTIVE BOX
62yM was admitted on     Patient is a 62y old  Male who presents with a chief complaint of Left sided weakness (15 Apr 2022 07:50)    HPI:  62 yr old M with no PMHX was brought to ED by EMS on 22 s/p fall for LE weakness. Patient states at 5:30 am this morning he got up to turn off the TV when his leg gave way and he fell. Pt's sister lives downstairs and heard the fall; she went upstairs and helped him up to a chair, but then pt got up and fell again. Pt's sister was concerned he was having a stroke so she called EMS.     In ED the patient was evaluated, and code stroke was called. CTH showed no acute infarct or hemorrhage. CTA head and neck showed no LVO or stenosis or aneurysms. CT perfusion showed no core infarct. IV TPA was not given because LKN was last night, NIHSS 3. Pt was given 2 baby ASA. He was found to have new onset Afib in ED, and upon my evaluation BP was elevated at 156/118.    At bedside pt is awake and alert, is aware that he fell earlier this morning. He denies any Chest pain , numbness, tingling, headache, visual changes, dizziness or vertigo. Patient does have a hx of speech impediment but noticed he had some difficulty speaking this morning; Denies any dysphagia;  Pt's sister states that he has not seen a medical doctor in years, does not take any medications; Patient sister Nancy was at bedsided who he would like to make decisions for him [phone]    - Patient seen and examined. No acute events overnight. Head CT yesterday stable. Patient drowsy awakens for exam, following commands minimally verbal. Denies headache, n/v, numb/tingling, weakness.    - pt seen and examined on 3N, no events overnight, pt seems frustrated and uncomfortable in bed, continued left arm dysmetria and weakness, states his left leg feel heavy, denies headache, dizziness, nausea or vomiting     4/15 seen by cardiology  1. Atrial fibrillation.  rate controlled. HR is 80s this AM   Pt not on AC secondary to hemorrhagic transformation.   cont metoprolol  2. CHF. Severely reduced LVEF of 15%- unknown etiology. Will hold off on ACEI for now and will increase betablocker to better rate control. MAy eventually need a cath or perfusion study with viability when clinical status improves , probably as oupt   3. +Troponins. Likely demand in setting of CVA, afib, CHF.   Medical mgmt as above.   Only one set positive and later sets negative.       PAST MEDICAL & SURGICAL HISTORY: None  FAMILY HISTORY: mother  at 87 from dementia; Father  at abdelrahman age of 87 from AFIB  Social Hx:  Nonsmoker, no drug or alcohol use (2022 12:00)      Imaging performed:  Head CT 2022: No acute intracranial hemorrhage or mass effect    CT angio gram Brain 2022:  CT brain perfusion: Apparent increased mean transit time in the bilateral temporal lobes, more on the right, and right frontal lobe, as well as in the bilateral cerebellum, may be artifactual. Consider further evaluation with MRI.    CTA head and neck:  -Patent cervical and intracranial major arterial vasculature without evidence of abrupt vessel cut off, hemodynamically significant stenosis, aneurysm, or dissection.  -Bilateral pleural effusions.     MRI Brain 2022  IMPRESSION: Acute infarction within the anterior RIGHT basal ganglia and straightening restricted diffusion and central hemorrhage measuring 2.4 cm, with mass effect on the anterior horn of the RIGHT lateral ventricle. Smaller acute infarctions involve the anterior inferior RIGHT frontal lobe, the posterior RIGHT basal ganglia and posterior RIGHT insular cortex as well as the posterior RIGHT temporal on lateral RIGHT occipital cortex, with restricted diffusion.    Head CT 2022  Slightly increased conspicuity of hemorrhagic transformation of right basal ganglia acute infarction. Similar regional mass effect.  Similar-appearing acute infarction involving the right inferolateral frontal lobe and right mid corona radiata.  No midline shift. No hydrocephalus.  Head CT 2022:  Evolving right basal ganglia region hemorrhagic infarct without significant change from the prior exam.  No new intracranial hemorrhage  Abdominal CT:  Possibility of mild steatosis or hepatocellular disease involving the liver. No biliary obstruction   Chest Xray 2022: neg findings    REVIEW OF SYSTEMS  Constitutional - No fever, No weight loss, No fatigue  HEENT - No eye pain, No visual disturbances, No difficulty hearing, No tinnitus, No vertigo, No neck pain  Respiratory - No cough, No wheezing, No shortness of breath  Cardiovascular - No chest pain, No palpitations  Gastrointestinal - No abdominal pain, No nausea, No vomiting, No diarrhea, No constipation  Genitourinary - No dysuria, No frequency, No hematuria, No incontinence  Neurological - No headaches, No memory loss, No loss of strength, No numbness, No tremors  Skin - No itching, No rashes, No lesions   Endocrine - No temperature intolerance  Musculoskeletal - No joint pain, No joint swelling, No muscle pain  Psychiatric - No depression, No anxiety    VITALS  T(C): 36.8 (04-15-22 @ 04:00), Max: 36.9 (22 @ 21:24)  HR: 99 (04-15-22 @ 04:00) (90 - 111)  BP: 132/78 (04-15-22 @ 04:00) (108/78 - 132/78)  RR: 18 (04-15-22 @ 04:00) (18 - 19)  SpO2: 96% (04-15-22 @ 04:00) (92% - 98%)  Wt(kg): --    PAST MEDICAL & SURGICAL HISTORY      SOCIAL HISTORY - as per documentation/history  Smoking - None  EtOH - None  Drugs - None    FUNCTIONAL HISTORY  Lives with sister in a pvt home. 3 CHULA 0HR 12 steps to bedroom  RHR. PTa I amb w/o AD  Sister works during the day    CURRENT FUNCTIONAL STATUS  supine to sit  maximum assist of one person  Sit to stand Maximum assist of 1 person. Pushes left. Left sided weakness    RECENT LABS - Reviewed  CBC Full  -  ( 2022 08:05 )  WBC Count : 13.90 K/uL  RBC Count : 5.26 M/uL  Hemoglobin : 16.0 g/dL  Hematocrit : 45.1 %  Platelet Count - Automated : 180 K/uL  Mean Cell Volume : 85.7 fl  Mean Cell Hemoglobin : 30.4 pg  Mean Cell Hemoglobin Concentration : 35.5 gm/dL  Auto Neutrophil # : x  Auto Lymphocyte # : x  Auto Monocyte # : x  Auto Eosinophil # : x  Auto Basophil # : x  Auto Neutrophil % : x  Auto Lymphocyte % : x  Auto Monocyte % : x  Auto Eosinophil % : x  Auto Basophil % : x        135  |  105  |  18  ----------------------------<  107<H>  3.7   |  24  |  0.94    Ca    8.9      2022 08:05    ALLERGIES  No Known Allergies    MEDICATIONS   atorvastatin 80 milliGRAM(s) Oral at bedtime  chlorhexidine 4% Liquid 1 Application(s) Topical <User Schedule>  furosemide   Injectable 20 milliGRAM(s) IV Push daily  metoprolol tartrate 50 milliGRAM(s) Oral two times a day      ----------------------------------------------------------------------------------------  PHYSICAL EXAM  Constitutional - NAD, Comfortable  HEENT - NCAT, EOMI   Neck - Supple, No limited ROM  Chest - Breathing comfortably, No wheezing  Cardiovascular - S1S2   Abdomen - Soft   Extremities - No C/C/E, No calf tenderness   Neurologic Exam -                    Cognitive - AAO to self, place, date, year, situation     Communication - Fluent, No dysarthria     Cranial Nerves - CN 2-12 intact     Motor - No focal deficits                    LEFT    UE - ShAB 2/5, EF 2/5, EE 2/5, WE 2/5,  2/5                    RIGHT UE - ShAB 4/5, EF 4/5, EE 4/5, WE 4/5,  4/5                    LEFT    LE - HF 3-/5, KE 3-/5, DF 3-/5, PF 3-/5                    RIGHT LE - HF 4/5, KE 4/5, DF 4/5, PF 4/5        Sensory - Intact to LT     Reflexes - DTR right 2/4 Left 1/4     Coordination - FTN intact     OculoVestibular - No saccades, No nystagmus, VOR         Balance - poor Static  Psychiatric - Mood stable, Affect WNL  ----------------------------------------------------------------------------------------   62yM was admitted on     Patient is a 62y old  Male who presents with a chief complaint of Left sided weakness (15 Apr 2022 07:50)    HPI:  62 yr old M with no PMHX was brought to ED by EMS on 22 s/p fall for LE weakness. Patient states at 5:30 am this morning he got up to turn off the TV when his leg gave way and he fell. Pt's sister lives downstairs and heard the fall; she went upstairs and helped him up to a chair, but then pt got up and fell again. Pt's sister was concerned he was having a stroke so she called EMS.     In ED the patient was evaluated, and code stroke was called. CTH showed no acute infarct or hemorrhage. CTA head and neck showed no LVO or stenosis or aneurysms. CT perfusion showed no core infarct. IV TPA was not given because LKN was last night, NIHSS 3. Pt was given 2 baby ASA. He was found to have new onset Afib in ED, and upon my evaluation BP was elevated at 156/118.    At bedside pt is awake and alert, is aware that he fell earlier this morning. He denies any Chest pain , numbness, tingling, headache, visual changes, dizziness or vertigo. Patient does have a hx of speech impediment but noticed he had some difficulty speaking this morning; Denies any dysphagia;  Pt's sister states that he has not seen a medical doctor in years, does not take any medications; Patient sister Nancy was at bedsided who he would like to make decisions for him [phone]    - Patient seen and examined. No acute events overnight. Head CT yesterday stable. Patient drowsy awakens for exam, following commands minimally verbal. Denies headache, n/v, numb/tingling, weakness.    - pt seen and examined on 3N, no events overnight, pt seems frustrated and uncomfortable in bed, continued left arm dysmetria and weakness, states his left leg feel heavy, denies headache, dizziness, nausea or vomiting     4/15 seen by cardiology  1. Atrial fibrillation.  rate controlled. HR is 80s this AM   Pt not on AC secondary to hemorrhagic transformation.   cont metoprolol  2. CHF. Severely reduced LVEF of 15%- unknown etiology. Will hold off on ACEI for now and will increase betablocker to better rate control. MAy eventually need a cath or perfusion study with viability when clinical status improves , probably as oupt   3. +Troponins. Likely demand in setting of CVA, afib, CHF.   Medical mgmt as above.   Only one set positive and later sets negative.      seen by Infectious disease  leukocytosis. acute R CVA  - ua (-), xray clear from 4/15, f/u ct head stable  - wbc ct could be reactive ? microaspiration   - if continues to increase, repeat imaging, check blood cx  - observe off antibiotics for now  - fu cbc    PAST MEDICAL & SURGICAL HISTORY: None  FAMILY HISTORY: mother  at 87 from dementia; Father  at abdelrahman age of 87 from AFIB  Social Hx:  Nonsmoker, no drug or alcohol use (2022 12:00)    Imaging performed:  Head CT 2022: No acute intracranial hemorrhage or mass effect    CT angio gram Brain 2022:  CT brain perfusion: Apparent increased mean transit time in the bilateral temporal lobes, more on the right, and right frontal lobe, as well as in the bilateral cerebellum, may be artifactual. Consider further evaluation with MRI.    CTA head and neck:  -Patent cervical and intracranial major arterial vasculature without evidence of abrupt vessel cut off, hemodynamically significant stenosis, aneurysm, or dissection.  -Bilateral pleural effusions.     MRI Brain 2022  IMPRESSION: Acute infarction within the anterior RIGHT basal ganglia and straightening restricted diffusion and central hemorrhage measuring 2.4 cm, with mass effect on the anterior horn of the RIGHT lateral ventricle. Smaller acute infarctions involve the anterior inferior RIGHT frontal lobe, the posterior RIGHT basal ganglia and posterior RIGHT insular cortex as well as the posterior RIGHT temporal on lateral RIGHT occipital cortex, with restricted diffusion.    Head CT 2022  Slightly increased conspicuity of hemorrhagic transformation of right basal ganglia acute infarction. Similar regional mass effect.  Similar-appearing acute infarction involving the right inferolateral frontal lobe and right mid corona radiata.  No midline shift. No hydrocephalus.  Head CT 2022:  Evolving right basal ganglia region hemorrhagic infarct without significant change from the prior exam.  No new intracranial hemorrhage  Abdominal CT:  Possibility of mild steatosis or hepatocellular disease involving the liver. No biliary obstruction   Chest Xray 2022: neg findings    REVIEW OF SYSTEMS  Constitutional - No fever, No weight loss, No fatigue  HEENT - No eye pain, No visual disturbances, No difficulty hearing, No tinnitus, No vertigo, No neck pain  Respiratory - No cough, No wheezing, No shortness of breath  Cardiovascular - No chest pain, No palpitations  Gastrointestinal - No abdominal pain, No nausea, No vomiting, No diarrhea, No constipation  Genitourinary - No dysuria, No frequency, No hematuria, No incontinence  Neurological - No headaches, No memory loss, No loss of strength, No numbness, No tremors  Skin - No itching, No rashes, No lesions   Endocrine - No temperature intolerance  Musculoskeletal - No joint pain, No joint swelling, No muscle pain  Psychiatric - No depression, No anxiety    VITALS  T(C): 36.8 (04-15-22 @ 04:00), Max: 36.9 (22 @ 21:24)  HR: 99 (04-15-22 @ 04:00) (90 - 111)  BP: 132/78 (04-15-22 @ 04:00) (108/78 - 132/78)  RR: 18 (04-15-22 @ 04:00) (18 - 19)  SpO2: 96% (04-15-22 @ 04:00) (92% - 98%)  Wt(kg): --    PAST MEDICAL & SURGICAL HISTORY      SOCIAL HISTORY - as per documentation/history  Smoking - None  EtOH - None  Drugs - None    FUNCTIONAL HISTORY  Lives with sister in a pvt home. 3 CHULA 0HR 12 steps to bedroom  RHR. PTa I amb w/o AD  Sister works during the day    CURRENT FUNCTIONAL STATUS  supine to sit  maximum assist of one person  Sit to stand Maximum assist of 1 person. Pushes left. Left sided weakness    RECENT LABS - Reviewed  CBC Full  -  ( 2022 08:05 )  WBC Count : 13.90 K/uL  RBC Count : 5.26 M/uL  Hemoglobin : 16.0 g/dL  Hematocrit : 45.1 %  Platelet Count - Automated : 180 K/uL  Mean Cell Volume : 85.7 fl  Mean Cell Hemoglobin : 30.4 pg  Mean Cell Hemoglobin Concentration : 35.5 gm/dL  Auto Neutrophil # : x  Auto Lymphocyte # : x  Auto Monocyte # : x  Auto Eosinophil # : x  Auto Basophil # : x  Auto Neutrophil % : x  Auto Lymphocyte % : x  Auto Monocyte % : x  Auto Eosinophil % : x  Auto Basophil % : x        135  |  105  |  18  ----------------------------<  107<H>  3.7   |  24  |  0.94    Ca    8.9      2022 08:05    ALLERGIES  No Known Allergies    MEDICATIONS   atorvastatin 80 milliGRAM(s) Oral at bedtime  chlorhexidine 4% Liquid 1 Application(s) Topical <User Schedule>  furosemide   Injectable 20 milliGRAM(s) IV Push daily  metoprolol tartrate 50 milliGRAM(s) Oral two times a day      ----------------------------------------------------------------------------------------  PHYSICAL EXAM  Constitutional - NAD, Comfortable. No cough.   HEENT - NCAT, EOMI   Neck - Supple, No limited ROM  Chest - Breathing comfortably, No wheezing.   Cardiovascular - S1S2   Abdomen - Soft   Extremities - No C/C/E, No calf tenderness. neg palpable cord bilaterally.   Neurologic Exam -                    Cognitive - AAO to self, place, date, year, situation     Communication - Fluent, No dysarthria     Cranial Nerves - CN 2-12 intact     Motor - No focal deficits                    LEFT    UE - ShAB 2/5, EF 2/5, EE 2/5, WE 2/5,  2/5                    RIGHT UE - ShAB 4/5, EF 4/5, EE 4/5, WE 4/5,  4/5                    LEFT    LE - HF 3-/5, KE 3-/5, DF 3-/5, PF 3-/5                    RIGHT LE - HF 4/5, KE 4/5, DF 4/5, PF 4/5        Sensory - Intact to LT     Reflexes - DTR right 2/4 Left 1/4     Coordination - FTN intact     OculoVestibular - No saccades, No nystagmus, VOR         Balance - poor Static  Psychiatric - Mood stable, Affect WNL  ----------------------------------------------------------------------------------------

## 2022-04-19 LAB
ALBUMIN SERPL ELPH-MCNC: 2.8 G/DL — LOW (ref 3.3–5)
ALP SERPL-CCNC: 99 U/L — SIGNIFICANT CHANGE UP (ref 40–120)
ALT FLD-CCNC: 36 U/L — SIGNIFICANT CHANGE UP (ref 12–78)
ANION GAP SERPL CALC-SCNC: 6 MMOL/L — SIGNIFICANT CHANGE UP (ref 5–17)
AST SERPL-CCNC: 24 U/L — SIGNIFICANT CHANGE UP (ref 15–37)
BILIRUB SERPL-MCNC: 1.8 MG/DL — HIGH (ref 0.2–1.2)
BUN SERPL-MCNC: 28 MG/DL — HIGH (ref 7–23)
CALCIUM SERPL-MCNC: 9.2 MG/DL — SIGNIFICANT CHANGE UP (ref 8.5–10.1)
CHLORIDE SERPL-SCNC: 103 MMOL/L — SIGNIFICANT CHANGE UP (ref 96–108)
CO2 SERPL-SCNC: 26 MMOL/L — SIGNIFICANT CHANGE UP (ref 22–31)
CREAT SERPL-MCNC: 1.06 MG/DL — SIGNIFICANT CHANGE UP (ref 0.5–1.3)
EGFR: 78 ML/MIN/1.73M2 — SIGNIFICANT CHANGE UP
GLUCOSE SERPL-MCNC: 110 MG/DL — HIGH (ref 70–99)
HCT VFR BLD CALC: 44.6 % — SIGNIFICANT CHANGE UP (ref 39–50)
HGB BLD-MCNC: 15 G/DL — SIGNIFICANT CHANGE UP (ref 13–17)
MCHC RBC-ENTMCNC: 29.8 PG — SIGNIFICANT CHANGE UP (ref 27–34)
MCHC RBC-ENTMCNC: 33.6 GM/DL — SIGNIFICANT CHANGE UP (ref 32–36)
MCV RBC AUTO: 88.7 FL — SIGNIFICANT CHANGE UP (ref 80–100)
PLATELET # BLD AUTO: 178 K/UL — SIGNIFICANT CHANGE UP (ref 150–400)
POTASSIUM SERPL-MCNC: 4.2 MMOL/L — SIGNIFICANT CHANGE UP (ref 3.5–5.3)
POTASSIUM SERPL-SCNC: 4.2 MMOL/L — SIGNIFICANT CHANGE UP (ref 3.5–5.3)
PROT SERPL-MCNC: 6.9 GM/DL — SIGNIFICANT CHANGE UP (ref 6–8.3)
RBC # BLD: 5.03 M/UL — SIGNIFICANT CHANGE UP (ref 4.2–5.8)
RBC # FLD: 13.1 % — SIGNIFICANT CHANGE UP (ref 10.3–14.5)
SODIUM SERPL-SCNC: 135 MMOL/L — SIGNIFICANT CHANGE UP (ref 135–145)
WBC # BLD: 13.13 K/UL — HIGH (ref 3.8–10.5)
WBC # FLD AUTO: 13.13 K/UL — HIGH (ref 3.8–10.5)

## 2022-04-19 PROCEDURE — 99232 SBSQ HOSP IP/OBS MODERATE 35: CPT

## 2022-04-19 PROCEDURE — 99231 SBSQ HOSP IP/OBS SF/LOW 25: CPT

## 2022-04-19 RX ORDER — DIGOXIN 250 MCG
1 TABLET ORAL
Qty: 0 | Refills: 0 | DISCHARGE
Start: 2022-04-19

## 2022-04-19 RX ADMIN — ATORVASTATIN CALCIUM 80 MILLIGRAM(S): 80 TABLET, FILM COATED ORAL at 22:01

## 2022-04-19 RX ADMIN — Medication 50 MILLIGRAM(S): at 09:05

## 2022-04-19 RX ADMIN — Medication 20 MILLIGRAM(S): at 09:05

## 2022-04-19 RX ADMIN — Medication 50 MILLIGRAM(S): at 22:02

## 2022-04-19 RX ADMIN — Medication 125 MICROGRAM(S): at 09:05

## 2022-04-19 RX ADMIN — CHLORHEXIDINE GLUCONATE 1 APPLICATION(S): 213 SOLUTION TOPICAL at 05:27

## 2022-04-19 NOTE — PROGRESS NOTE ADULT - SUBJECTIVE AND OBJECTIVE BOX
Interval History:  22: Patient has no complaints.    MEDICATIONS  (STANDING):  atorvastatin 80 milliGRAM(s) Oral at bedtime  chlorhexidine 4% Liquid 1 Application(s) Topical <User Schedule>  furosemide   Injectable 20 milliGRAM(s) IV Push daily  metoprolol tartrate 50 milliGRAM(s) Oral two times a day    Allergies: No Known Allergies    PHYSICAL EXAM:  Vital Signs Last 24 Hrs  T(C): 36.8 (2022 08:39), Max: 36.8 (2022 08:39)  T(F): 98.2 (2022 08:39), Max: 98.2 (2022 08:39)  HR: 86 (2022 11:05) (52 - 90)  BP: 117/71 (2022 08:39) (110/65 - 117/71)  BP(mean): --  RR: 18 (2022 08:39) (18 - 19)  SpO2: 100% (2022 08:39) (99% - 100%)    GENERAL: NAD, well-groomed  HEAD:  Atraumatic, Normocephalic  Neuro:  Awake, alert, no aphasia  CN: PERRL, EOMI, no nystagmus, slight flattening of left nasolabial fold, mild dysarthria, tongue protrudes in the midline  motor: + left pronator drift, 4/5 in LUE, 4-/5 in LLE, Left leg drift, 5/5 in RUE and RLE  sensory: intact to light touch  coordination: +dysmetria on left finger to nose  DTRs: symmetric, plantar responses flexor bilaterally    NIHSS: 5      LABS:                        16.0   13.90 )-----------( 180      ( 2022 08:05 )             45.1     04-14    135  |  105  |  18  ----------------------------<  107<H>  3.7   |  24  |  0.94    Ca    8.9      2022 08:05      Urinalysis Basic - ( 15 Apr 2022 10:45 )    Color: Yellow / Appearance: Clear / S.020 / pH: x  Gluc: x / Ketone: Trace  / Bili: Negative / Urobili: Negative   Blood: x / Protein: Negative / Nitrite: Negative   Leuk Esterase: Negative / RBC: x / WBC x   Sq Epi: x / Non Sq Epi: x / Bacteria: x      RADIOLOGY & ADDITIONAL STUDIES:  < from: CT Head No Cont (22 @ 10:53) >  IMPRESSION:  Evolving right basal ganglia region hemorrhagic infarct without   significant change from the prior exam.    No new intracranial hemorrhage.    CT Head No Cont (22 @ 08:19)     IMPRESSION:    Slightly increased conspicuity of hemorrhagic transformation of right   basal ganglia acute infarction. Similar regional mass effect.    Similar-appearing acute infarction involving the right inferolateral   frontal lobe and right mid corona radiata.    No midline shift. No hydrocephalus.    CT Angio Neck w/ IV Cont (22 @ 07:42) >    IMPRESSION:  CT brain perfusion: Apparent increased mean transit time in the bilateral   temporal lobes, more on the right, and right frontal lobe, as well as in   the bilateral cerebellum, may be artifactual. Consider further evaluation   with MRI.    CTA head and neck:    -Moderate atherosclerotic calcifications along the bilateral carotid   bifurcations without significant stenosis. The internal carotid arteries   are patent without significant stenosis. Dense atherosclerotic   calcifications along the bilateral cavernous segments without significant   stenosis.  -Patent cervical and intracranial major arterial vasculature without   evidence of abrupt vessel cut off, hemodynamically significant stenosis,   aneurysm, or dissection.  -Bilateral pleural effusions.    CT Brain Stroke Protocol (22 @ 07:31) >    IMPRESSION:  No acute intracranial hemorrhage or mass effect.    MRI head 22:  Acute infarction within the anterior RIGHT basal ganglia   and straightening restricted diffusion and central hemorrhage measuring   2.4 cm, with mass effect on the anterior horn of the RIGHT lateral   ventricle. Smaller acute infarctions involve the anterior inferior RIGHT   frontal lobe, the posterior RIGHT basal ganglia and posterior RIGHT   insular cortex as well as the posterior RIGHT temporal on lateral RIGHT   occipital cortex,  with restricted diffusion.    Echo 22:     Moderate (2+) mitral regurgitation is present.   Normal aortic valve structure and function.   Moderate (2+) tricuspid valve regurgitation is present.   The left ventricle cavity is mildly dilated.   Estimated left ventricular ejection fraction is 15 %.   Severe, diffuse hypokinesis of the left ventricle is present.   The right ventricle exhibits mild diffuse hypokinesis, and mild   depression   of contractility.   Pleural effusion - is present.      CT head 22:  Slightly increased conspicuity of hemorrhagic transformation of right   basal ganglia acute infarction. Similar regional mass effect.    Similar-appearing acute infarction involving the right inferolateral   frontal lobe and right mid corona radiata.    No midline shift. No hydrocephalus.

## 2022-04-19 NOTE — PROGRESS NOTE ADULT - SUBJECTIVE AND OBJECTIVE BOX
HPI:  62 yr old M with no PMHX was brought to ED by EMS on 22 s/p fall for LE weakness. Patient states at 5:30 am this morning he got up to turn off the TV when his leg gave way and he fell. Pt's sister lives downstairs and heard the fall; she went upstairs and helped him up to a chair, but then pt got up and fell again. Pt's sister was concerned he was having a stroke so she called EMS.   In ED the patient was evaluated, and code stroke was called. CTH showed no acute infarct or hemorrhage. CTA head and neck showed no LVO or stenosis or aneurysms. CT perfusion showed no core infarct. IV TPA was not given because LKN was last night, NIHSS 3. Pt was given 2 baby ASA. He was found to have new onset Afib in ED.  He has not seen an MD in many years.        PAST MEDICAL & SURGICAL HISTORY:     No significant PMHX      FAMILY HISTORY: mother  at 87 from dementia; Father  at abdelrahman age of 87 from AFIB  Social Hx:  Nonsmoker, no drug or alcohol use (2022 12:00)      22:  Pt seen sitting OOB in chair eating breakfast in [facility], offers no c/o, wants to go to rehab.  Pt had fall out of chair on  requiring sutures to left forehead laceration.  TELE:  atrial fib rate controlled, 70-80 bpm, overnight occasional pauses 2-2.6 seconds.  No pauses >3 seconds        MEDICATIONS  (STANDING):  atorvastatin 80 milliGRAM(s) Oral at bedtime  chlorhexidine 4% Liquid 1 Application(s) Topical <User Schedule>  digoxin     Tablet 125 MICROGram(s) Oral daily  furosemide    Tablet 20 milliGRAM(s) Oral daily  metoprolol tartrate 50 milliGRAM(s) Oral two times a day    REVIEW OF SYSTEMS:    CONSTITUTIONAL: No weakness, fevers or chills  EYES/ENT: No visual changes;  No vertigo or throat pain   NECK: No pain or stiffness  RESPIRATORY: No cough, wheezing, hemoptysis; No shortness of breath  CARDIOVASCULAR: No chest pain or palpitations  GASTROINTESTINAL: No abdominal or epigastric pain. No nausea, vomiting, or hematemesis; No diarrhea or constipation. No melena or hematochezia.  GENITOURINARY: No dysuria, frequency or hematuria  NEUROLOGICAL: Mild L sided weakness  SKIN: No itching, burning, rashes, or lesions   All other review of systems is negative unless indicated above  MEDICATIONS  (PRN):      Allergies    No Known Allergies            Vital Signs Last 24 Hrs  T(C): 36.9 (2022 08:35), Max: 36.9 (2022 08:35)  T(F): 98.4 (2022 08:35), Max: 98.4 (2022 08:35)  HR: 66 (2022 08:35) (55 - 86)  BP: 122/80 (2022 08:35) (102/83 - 122/80)  BP(mean): --  RR: 18 (2022 08:35) (18 - 18)  SpO2: 97% (2022 08:35) (97% - 100%)    PHYSICAL EXAMINATION:    GENERAL APPEARANCE:  Pt. is not currently dyspneic, in no distress. Pt. is alert, oriented, and pleasant.  HEENT:  Pupils are normal and react normally. No icterus.  Surtures above left eyebrow are intact.  NECK:  Supple. No lymphadenopathy. Jugular venous pressure not elevated. Carotids equal.   HEART:   Irregular S1S2.  There are no murmurs, rubs or gallops noted  CHEST:  Chest is clear to auscultation. Normal respiratory effort.  ABDOMEN:  Soft and nontender.   EXTREMITIES:  There is no edema.   SKIN:  No rash or significant lesions are noted.    LABS:                        15.0   13.13 )-----------( 178      ( 2022 08:28 )             44.6         135  |  103  |  28<H>  ----------------------------<  110<H>  4.2   |  26  |  1.06    Ca    9.2      2022 08:28    TPro  6.9  /  Alb  2.8<L>  /  TBili  1.8<H>  /  DBili  x   /  AST  24  /  ALT  36  /  AlkPhos  99            RADIOLOGY & ADDITIONAL TESTS:    < from: TTE Echo Complete w/o Contrast w/ Doppler (22 @ 09:02) >   Impression     Summary     Moderate (2+) mitral regurgitation is present.   Normal aortic valve structure and function.   Moderate (2+) tricuspid valve regurgitation is present.   The left ventricle cavity is mildly dilated.   Estimated left ventricular ejection fraction is 15 %.   Severe, diffuse hypokinesis of the left ventricle is present.   The right ventricle exhibits mild diffuse hypokinesis, and mild   depression   of contractility.   Pleural effusion - is present.      < from: CT Head No Cont (22 @ 10:53) >  IMPRESSION:  Evolving right basal ganglia region hemorrhagic infarct without   significant change from the prior exam.    No new intracranial hemorrhage.        < from: MR Head No Cont (22 @ 09:44) >  IMPRESSION:   Acute infarction within the anterior RIGHT basal ganglia   and straightening restricted diffusion and central hemorrhage measuring   2.4 cm, with mass effect on the anterior horn of the RIGHT lateral   ventricle. Smaller acute infarctions involve the anterior inferior RIGHT   frontal lobe, the posterior RIGHT basal ganglia and posterior RIGHT   insular cortex as well as the posterior RIGHT temporal on lateral RIGHT   occipital cortex,  with restricted diffusion.

## 2022-04-19 NOTE — PROGRESS NOTE ADULT - SUBJECTIVE AND OBJECTIVE BOX
Patient is a 62y old  Male who presents with a chief complaint of Left sided weakness (19 Apr 2022 10:38)      HPI:  Patient is a 62 yr old male with no PMHX was brought to ED by EMS on 4/11/22 s/p fall for LE weakness. Patient states at 5:30 am this morning he got up to turn off the TV when his leg gave way and he fell. Pt's sister lives downstairs and heard the fall; she went upstairs and helped him up to a chair, but then pt got up and fell again. Pt's sister was concerned he was having a stroke so she called EMS.     In ED the patient was evaluated, and code stroke was called. CTH showed no acute infarct or hemorrhage. CTA head and neck showed no LVO or stenosis or aneurysms. CT perfusion showed no core infarct. IV TPA was not given because LKN was last night, NIHSS 3. Pt was given 2 baby ASA. He was found to have new onset Afib in ED, and upon my evaluation BP was elevated at 156/118.    At bedside pt is awake and alert, is aware that he fell earlier this morning. He denies any Chest pain , numbness, tingling, headache, visual changes, dizziness or vertigo. Patient does have a hx of speech impediment but noticed he had some difficulty speaking this morning; Denies any dysphagia;  Pt's sister states that he has not seen a medical doctor in years, does not take any medications; Patient sister Nancy was at bedsided who he would like to make decisions for him [phone]    Neurosurgery called this morning for noted hemorrhage on MRI brain, acute infarct right basal ganglia. Patient seen and examined on [address] [place]. Patient drowsy, awakens for exam. He denies headache, n/v, numb.tingling, new weakness.    4/13- Patient seen and examined. No acute events overnight. Head CT yesterday stable. Patient drowsy awakens for exam, following commands minimally verbal. Denies headache, n/v, numb/tingling, weakness.    4/14- pt seen and examined on 3[facility], no events overnight, pt seems frustrated and uncomfortable in bed, continued left arm dysmetria and weakness, states his left leg feel heavy, denies headache, dizziness, nausea or vomiting     4/19 - Chart / films reviewedNeurosurgery recalled to evaluate pt for ASA use    atorvastatin 80 milliGRAM(s) Oral at bedtime  chlorhexidine 4% Liquid 1 Application(s) Topical <User Schedule>  digoxin     Tablet 125 MICROGram(s) Oral daily  furosemide    Tablet 20 milliGRAM(s) Oral daily  metoprolol tartrate 50 milliGRAM(s) Oral two times a day      Vital Signs Last 24 Hrs  T(C): 36.9 (19 Apr 2022 08:35), Max: 36.9 (19 Apr 2022 08:35)  T(F): 98.4 (19 Apr 2022 08:35), Max: 98.4 (19 Apr 2022 08:35)  HR: 66 (19 Apr 2022 08:35) (55 - 66)  BP: 122/80 (19 Apr 2022 08:35) (102/83 - 122/80)  BP(mean): --  RR: 18 (19 Apr 2022 08:35) (18 - 18)  SpO2: 97% (19 Apr 2022 08:35) (97% - 100%)                          15.0   13.13 )-----------( 178      ( 19 Apr 2022 08:28 )             44.6       04-19    135  |  103  |  28<H>  ----------------------------<  110<H>  4.2   |  26  |  1.06    Ca    9.2      19 Apr 2022 08:28    TPro  6.9  /  Alb  2.8<L>  /  TBili  1.8<H>  /  DBili  x   /  AST  24  /  ALT  36  /  AlkPhos  99  04-19          Radiology:  CT Head No Cont (04.17.22 @ 10:53) >  Evolving right basal ganglia region hemorrhagic infarct without   significant change from the prior exam.    No new intracranial hemorrhage.

## 2022-04-19 NOTE — PROGRESS NOTE ADULT - ASSESSMENT
62 yr old M with no PMHX was brought to ED by EMS on 4/11/22 s/p fall for LE weakness, admitted for CVA complicated by hemorrhagic conversion and new onset afib.    A/P:  Acute CVA, hemorrhagic.  New onset Atrial fibrillation. HFrEF  Continue tele monitoring.  HR improved from prior, rates 70-90, no significant pauses.  Continue betablocker titration for improved rate control as ACEi also held due to renal dysfunction.  Digoxin was changed to PO dosing.  No OAC at this time due to hemorrhagic transformation.  LVEF 15%, Patient will require ischemia evaluation/cath when clinically appropriate.  Discussed with Dr. Roy, and patient.

## 2022-04-20 PROCEDURE — 99232 SBSQ HOSP IP/OBS MODERATE 35: CPT

## 2022-04-20 PROCEDURE — 71045 X-RAY EXAM CHEST 1 VIEW: CPT | Mod: 26

## 2022-04-20 RX ADMIN — Medication 20 MILLIGRAM(S): at 09:32

## 2022-04-20 RX ADMIN — Medication 50 MILLIGRAM(S): at 09:32

## 2022-04-20 RX ADMIN — ATORVASTATIN CALCIUM 80 MILLIGRAM(S): 80 TABLET, FILM COATED ORAL at 21:55

## 2022-04-20 RX ADMIN — Medication 125 MICROGRAM(S): at 09:32

## 2022-04-20 RX ADMIN — Medication 50 MILLIGRAM(S): at 21:55

## 2022-04-20 RX ADMIN — CHLORHEXIDINE GLUCONATE 1 APPLICATION(S): 213 SOLUTION TOPICAL at 05:52

## 2022-04-20 NOTE — PROGRESS NOTE ADULT - ASSESSMENT
A/P: 62 yr old M with no PMHX was brought to ED by EMS on 4/11/22 s/p fall for LE weakness.     Atrial fibrillation.   suboptimally rate controlled. HR is   Off AC secondary to hemorrhagic transformation.   Rate control with Dig level   CHF. Severely reduced LVEF of 15%- unknown etiology. Will hold off on ACEI for now and will increase betablocker to better rate control. Will eventually need a cath or perfusion study with viability when clinical status improves   Probably cath on friday

## 2022-04-20 NOTE — PROGRESS NOTE ADULT - SUBJECTIVE AND OBJECTIVE BOX
Interval History:  22: No new changes today.    MEDICATIONS  (STANDING):  atorvastatin 80 milliGRAM(s) Oral at bedtime  chlorhexidine 4% Liquid 1 Application(s) Topical <User Schedule>  furosemide   Injectable 20 milliGRAM(s) IV Push daily  metoprolol tartrate 50 milliGRAM(s) Oral two times a day    Allergies: No Known Allergies    PHYSICAL EXAM:  Vital Signs Last 24 Hrs  T(C): 36.8 (2022 08:39), Max: 36.8 (2022 08:39)  T(F): 98.2 (2022 08:39), Max: 98.2 (2022 08:39)  HR: 86 (2022 11:05) (52 - 90)  BP: 117/71 (2022 08:39) (110/65 - 117/71)  BP(mean): --  RR: 18 (2022 08:39) (18 - 19)  SpO2: 100% (2022 08:39) (99% - 100%)    GENERAL: NAD, well-groomed  HEAD:  Atraumatic, Normocephalic  Neuro:  Awake, alert, no aphasia  CN: PERRL, EOMI, no nystagmus, slight flattening of left nasolabial fold, mild dysarthria, tongue protrudes in the midline  motor: + left pronator drift, 4/5 in LUE, 4-/5 in LLE, Left leg drift, 5/5 in RUE and RLE  sensory: intact to light touch  coordination: +dysmetria on left finger to nose  DTRs: symmetric, plantar responses flexor bilaterally    NIHSS: 5      LABS:                        16.0   13.90 )-----------( 180      ( 2022 08:05 )             45.1     04-14    135  |  105  |  18  ----------------------------<  107<H>  3.7   |  24  |  0.94    Ca    8.9      2022 08:05      Urinalysis Basic - ( 15 Apr 2022 10:45 )    Color: Yellow / Appearance: Clear / S.020 / pH: x  Gluc: x / Ketone: Trace  / Bili: Negative / Urobili: Negative   Blood: x / Protein: Negative / Nitrite: Negative   Leuk Esterase: Negative / RBC: x / WBC x   Sq Epi: x / Non Sq Epi: x / Bacteria: x      RADIOLOGY & ADDITIONAL STUDIES:  < from: CT Head No Cont (22 @ 10:53) >  IMPRESSION:  Evolving right basal ganglia region hemorrhagic infarct without   significant change from the prior exam.    No new intracranial hemorrhage.    CT Head No Cont (22 @ 08:19)     IMPRESSION:    Slightly increased conspicuity of hemorrhagic transformation of right   basal ganglia acute infarction. Similar regional mass effect.    Similar-appearing acute infarction involving the right inferolateral   frontal lobe and right mid corona radiata.    No midline shift. No hydrocephalus.    CT Angio Neck w/ IV Cont (22 @ 07:42) >    IMPRESSION:  CT brain perfusion: Apparent increased mean transit time in the bilateral   temporal lobes, more on the right, and right frontal lobe, as well as in   the bilateral cerebellum, may be artifactual. Consider further evaluation   with MRI.    CTA head and neck:    -Moderate atherosclerotic calcifications along the bilateral carotid   bifurcations without significant stenosis. The internal carotid arteries   are patent without significant stenosis. Dense atherosclerotic   calcifications along the bilateral cavernous segments without significant   stenosis.  -Patent cervical and intracranial major arterial vasculature without   evidence of abrupt vessel cut off, hemodynamically significant stenosis,   aneurysm, or dissection.  -Bilateral pleural effusions.    CT Brain Stroke Protocol (22 @ 07:31) >    IMPRESSION:  No acute intracranial hemorrhage or mass effect.    MRI head 22:  Acute infarction within the anterior RIGHT basal ganglia   and straightening restricted diffusion and central hemorrhage measuring   2.4 cm, with mass effect on the anterior horn of the RIGHT lateral   ventricle. Smaller acute infarctions involve the anterior inferior RIGHT   frontal lobe, the posterior RIGHT basal ganglia and posterior RIGHT   insular cortex as well as the posterior RIGHT temporal on lateral RIGHT   occipital cortex,  with restricted diffusion.    Echo 22:     Moderate (2+) mitral regurgitation is present.   Normal aortic valve structure and function.   Moderate (2+) tricuspid valve regurgitation is present.   The left ventricle cavity is mildly dilated.   Estimated left ventricular ejection fraction is 15 %.   Severe, diffuse hypokinesis of the left ventricle is present.   The right ventricle exhibits mild diffuse hypokinesis, and mild   depression   of contractility.   Pleural effusion - is present.      CT head 22:  Slightly increased conspicuity of hemorrhagic transformation of right   basal ganglia acute infarction. Similar regional mass effect.    Similar-appearing acute infarction involving the right inferolateral   frontal lobe and right mid corona radiata.    No midline shift. No hydrocephalus.

## 2022-04-20 NOTE — PROGRESS NOTE ADULT - SUBJECTIVE AND OBJECTIVE BOX
Patient is a 62y old  Male who presents with a chief complaint of Left sided weakness (14 Apr 2022 16:35)    4/15- lethargic but arousable , denies HA   tele shows rate controlled afib     [facility], [facility] off Dig secondary to elevated Dig level    [facility] HR better controlled with Dig, improvement on the right side    MEDICATIONS  (STANDING):  atorvastatin 80 milliGRAM(s) Oral at bedtime  chlorhexidine 4% Liquid 1 Application(s) Topical <User Schedule>  furosemide   Injectable 20 milliGRAM(s) IV Push daily  metoprolol tartrate 50 milliGRAM(s) Oral two times a day    MEDICATIONS  (PRN):            Vital Signs Last 24 Hrs  T(C): 36.8 (15 Apr 2022 04:00), Max: 36.9 (14 Apr 2022 21:24)  T(F): 98.2 (15 Apr 2022 04:00), Max: 98.5 (14 Apr 2022 21:24)  HR: 99 (15 Apr 2022 04:00) (90 - 111)  BP: 132/78 (15 Apr 2022 04:00) (108/78 - 132/78)  BP(mean): 88 (15 Apr 2022 04:00) (88 - 88)  RR: 18 (15 Apr 2022 04:00) (18 - 19)  SpO2: 96% (15 Apr 2022 04:00) (92% - 98%)            INTERPRETATION OF TELEMETRY:    ECG:        LABS:                        16.0   13.90 )-----------( 180      ( 14 Apr 2022 08:05 )             45.1     04-14    135  |  105  |  18  ----------------------------<  107<H>  3.7   |  24  |  0.94    Ca    8.9      14 Apr 2022 08:05              I&O's Summary    14 Apr 2022 07:01  -  15 Apr 2022 07:00  --------------------------------------------------------  IN: 0 mL / OUT: 300 mL / NET: -300 mL      BNP  RADIOLOGY & ADDITIONAL STUDIES:

## 2022-04-20 NOTE — PROGRESS NOTE ADULT - SUBJECTIVE AND OBJECTIVE BOX
Patient is a 63 y/o/m w/ PMHx, admitted in the ED 22 s/p fall for LE weakness. Patient states at 5:30 am this morning he got up to turn off the TV when his leg gave way and he fell. Pt's sister lives downstairs and heard the fall; she went upstairs and helped him up to a chair, but then pt got up and fell again. Pt's sister was concerned he was having a stroke so she called EMS. s/p code stroke in the ED.      In ED the patient was evaluated, and code stroke was called. CTH showed no acute infarct or hemorrhage. CTA head and neck showed no LVO or stenosis or aneurysms. CT perfusion showed no core infarct. IV TPA was not given because LKN was last night, NIHSS 4-5. Pt was given 2 baby ASA. He was found to have new onset Afib in ED, and upon my evaluation his BP was elevated at 156/118.    Medical progress:   Complaints:   State of mind:      Addendum: Patient tried to get up from chair to bed on his own despite being told not to, while nurse was in another room taking care of the patient. patient fell - now with laceration of the (L) orbit /  will scan - head.       REVIEW OF SYSTEMS:  Poor historian    Physical Exam:   GENERAL APPEARANCE: + weakness  T(C): 37.2 (2022 09:20), Max: 37.2 (2022 09:20)  T(F): 98.9 (2022 09:20), Max: 98.9 (2022 09:20)  HR: 72 (2022 09:20) (72 - 88)  BP: 105/67 (2022 09:20) (101/79 - 119/97)  RR: 19 (2022 09:20) (18 - 19)  SpO2: 98% (2022 09:20) (98% - 100%)  HEENT:  Head is normocephalic    Skin:  Warm and dry without any rash   NECK:  Supple without lymphadenopathy.   HEART:  Regular rate and rhythm. normal S1 and S2   LUNGS:  Good ins/exp effort, no W/R/R/C  ABDOMEN:  Soft, nontender, nondistended with good bowel sounds heard  EXTREMITIES:  Without cyanosis, clubbing or edema.   NEUROLOGICAL: dysarthria   slight flattening of left nasolabial fold, mild dysarthria, tongue protrudes in the midline. coordination: +dysmetria on left finger to nose    Labs:     CBC Full  -  ( 2022 07:59 )  WBC Count : 15.14 K/uL  RBC Count : 5.33 M/uL  Hemoglobin : 16.1 g/dL  Hematocrit : 48.2 %  Platelet Count - Automated : 191 K/uL  Mean Cell Volume : 90.4 fl  Mean Cell Hemoglobin : 30.2 pg  Mean Cell Hemoglobin Concentration : 33.4 gm/dL  Auto Neutrophil # : x  Auto Lymphocyte # : x  Auto Monocyte # : x  Auto Eosinophil # : x  Auto Basophil # : x  Auto Neutrophil % : x  Auto Lymphocyte % : x  Auto Monocyte % : x  Auto Eosinophil % : x  Auto Basophil % : x      Urinalysis Basic - ( 15 Apr 2022 10:45 )    Color: Yellow / Appearance: Clear / S.020 / pH: x  Gluc: x / Ketone: Trace  / Bili: Negative / Urobili: Negative   Blood: x / Protein: Negative / Nitrite: Negative   Leuk Esterase: Negative / RBC: x / WBC x   Sq Epi: x / Non Sq Epi: x / Bacteria: x          135  |  104  |  27<H>  ----------------------------<  101<H>  3.7   |  23  |  0.95    Ca    9.4      2022 08:31    TPro  6.6  /  Alb  3.0<L>  /  TBili  2.1<H>  /  DBili  x   /  AST  21  /  ALT  31  /  AlkPhos  89        # Acute right CVA, with hemorrhagic transformation. ? Embolic d/t new onset Afib  - CT scans reviewed  - Patient has been in the hospital since , I want to discyss with Dr. Edwards -  if anticoagulation can be started and when?  - Repeat head CT on   - BPs at goal  - Continue statin / hold AC and anti-platelet therapy  - I want to discuss care with Dr. Edwards in regards of starting anticoagulation and when that can be started    # New onset a-fib (patient poor rhythm control with good HR control)  - continues to be  atrial fibrillation -  night goes to 40s, frequent PVCs, HR 60-80s.   - d/c dig as patient's dig level is elevated  - EF on echo - 15%.  - Have to hold AC for now  - eventual ischemia workup  - Cardiology is following.    # CHF. Severely reduced LVEF of 15%- ischemic vs reversible cardiomyopathy . Will hold off on ACEI for now and will increase betablocker to better rate control  - eventually need a cath or perfusion study with viability    - patient's cardiomyopathy secondary to     # Leukocytosis -  patient might be microaspiration   -  patient re-evaluation asked to speech and swallow  - patient does not appears to have any fevers, chills or shakes  - no signs of infection /  if any fevers, chills or shakes -  will initiate infectious workup    # (+) Troponin Likely demand in setting of CVA, afib   - Medical mgmt. as above.   - Only one set positive and later sets negative.        Patient is a 63 y/o/m w/ PMHx, admitted in the ED 22 s/p fall for LE weakness. Patient states at 5:30 am this morning he got up to turn off the TV when his leg gave way and he fell. Pt's sister lives downstairs and heard the fall; she went upstairs and helped him up to a chair, but then pt got up and fell again. Pt's sister was concerned he was having a stroke so she called EMS. s/p code stroke in the ED.      In ED the patient was evaluated, and code stroke was called. CTH showed no acute infarct or hemorrhage. CTA head and neck showed no LVO or stenosis or aneurysms. CT perfusion showed no core infarct. IV TPA was not given because LKN was last night, NIHSS 4-5. Pt was given 2 baby ASA. He was found to have new onset Afib in ED, and upon my evaluation his BP was elevated at 156/118.    Medical progress:   Complaints:   State of mind:      Addendum: Patient tried to get up from chair to bed on his own despite being told not to, while nurse was in another room taking care of the patient. patient fell - now with laceration of the (L) orbit /  will scan - head.       REVIEW OF SYSTEMS:  Poor historian    Physical Exam:   GENERAL APPEARANCE: + weakness  T(C): 37.2 (2022 09:20), Max: 37.2 (2022 09:20)  T(F): 98.9 (2022 09:20), Max: 98.9 (2022 09:20)  HR: 72 (2022 09:20) (72 - 88)  BP: 105/67 (2022 09:20) (101/79 - 119/97)  RR: 19 (2022 09:20) (18 - 19)  SpO2: 98% (2022 09:20) (98% - 100%)  HEENT:  Head is normocephalic    Skin:  Warm and dry without any rash   NECK:  Supple without lymphadenopathy.   HEART:  Regular rate and rhythm. normal S1 and S2   LUNGS:  Good ins/exp effort, no W/R/R/C  ABDOMEN:  Soft, nontender, nondistended with good bowel sounds heard  EXTREMITIES:  Without cyanosis, clubbing or edema.   NEUROLOGICAL: dysarthria   slight flattening of left nasolabial fold, mild dysarthria, tongue protrudes in the midline. coordination: +dysmetria on left finger to nose    Labs:     CBC Full  -  ( 2022 07:59 )  WBC Count : 15.14 K/uL  RBC Count : 5.33 M/uL  Hemoglobin : 16.1 g/dL  Hematocrit : 48.2 %  Platelet Count - Automated : 191 K/uL  Mean Cell Volume : 90.4 fl  Mean Cell Hemoglobin : 30.2 pg  Mean Cell Hemoglobin Concentration : 33.4 gm/dL  Auto Neutrophil # : x  Auto Lymphocyte # : x  Auto Monocyte # : x  Auto Eosinophil # : x  Auto Basophil # : x  Auto Neutrophil % : x  Auto Lymphocyte % : x  Auto Monocyte % : x  Auto Eosinophil % : x  Auto Basophil % : x      Urinalysis Basic - ( 15 Apr 2022 10:45 )    Color: Yellow / Appearance: Clear / S.020 / pH: x  Gluc: x / Ketone: Trace  / Bili: Negative / Urobili: Negative   Blood: x / Protein: Negative / Nitrite: Negative   Leuk Esterase: Negative / RBC: x / WBC x   Sq Epi: x / Non Sq Epi: x / Bacteria: x          135  |  104  |  27<H>  ----------------------------<  101<H>  3.7   |  23  |  0.95    Ca    9.4      2022 08:31    TPro  6.6  /  Alb  3.0<L>  /  TBili  2.1<H>  /  DBili  x   /  AST  21  /  ALT  31  /  AlkPhos  89        # Acute right CVA, with hemorrhagic transformation. ? Embolic d/t new onset Afib  - CT scans reviewed  - Patient has been in the hospital since , I want to discyss with Dr. Edwards -  if anticoagulation can be started and when?  - Repeat head CT on   - BPs at goal  - Continue statin / hold AC and anti-platelet therapy  - I want to discuss care with Dr. Edwards in regards of starting anticoagulation and when that can be started    # New onset a-fib (patient poor rhythm control with good HR control)  - continues to be  atrial fibrillation -  night goes to 40s, frequent PVCs, HR 60-80s.   - d/c dig as patient's dig level is elevated  - EF on echo - 15%.  - Have to hold AC for now  - eventual ischemia workup  - Cardiology is following.    # CHF. Severely reduced LVEF of 15%- ischemic vs reversible cardiomyopathy . Will hold off on ACEI for now and will increase betablocker to better rate control  - eventually need a cath or perfusion study with viability    - patient's cardiomyopathy secondary to   - Will eventually need a cath or perfusion study with viability when clinical status improves   - ? cath on friday    # Leukocytosis -  patient might be microaspiration   -  patient re-evaluation asked to speech and swallow  - patient does not appears to have any fevers, chills or shakes  - no signs of infection /  if any fevers, chills or shakes -  will initiate infectious workup    # (+) Troponin Likely demand in setting of CVA, afib   - Medical mgmt. as above.   - Only one set positive and later sets negative.        Patient is a 63 y/o/m w/ PMHx, admitted in the ED 22 s/p fall for LE weakness. Patient states at 5:30 am this morning he got up to turn off the TV when his leg gave way and he fell. Pt's sister lives downstairs and heard the fall; she went upstairs and helped him up to a chair, but then pt got up and fell again. Pt's sister was concerned he was having a stroke so she called EMS. s/p code stroke in the ED.      In ED the patient was evaluated, and code stroke was called. CTH showed no acute infarct or hemorrhage. CTA head and neck showed no LVO or stenosis or aneurysms. CT perfusion showed no core infarct. IV TPA was not given because LKN was last night, NIHSS 4-5. Pt was given 2 baby ASA. He was found to have new onset Afib in ED, and upon my evaluation his BP was elevated at 156/118.    Medical progress: Patient is sick. Coughing all night as per neighbor int he same room. Patient denies any HA, CP, SOB. Notes of gurgling in his throat  Complaints: coughing  State of mind:  normal /  slow    Addendum: Patient tried to get up from chair to bed on his own despite being told not to, while nurse was in another room taking care of the patient. patient fell - now with laceration of the (L) orbit /  will scan - head.       REVIEW OF SYSTEMS:  Poor historian    Physical Exam:   GENERAL APPEARANCE: + weakness  T(C): 37.2 (2022 09:20), Max: 37.2 (2022 09:20)  T(F): 98.9 (2022 09:20), Max: 98.9 (2022 09:20)  HR: 72 (2022 09:20) (72 - 88)  BP: 105/67 (2022 09:20) (101/79 - 119/97)  RR: 19 (2022 09:20) (18 - 19)  SpO2: 98% (2022 09:20) (98% - 100%)  HEENT:  Head is normocephalic    Skin:  Warm and dry without any rash   NECK:  Supple without lymphadenopathy.   HEART:  Regular rate and rhythm. normal S1 and S2   LUNGS:  Good ins/exp effort, no W/R/R/C  ABDOMEN:  Soft, nontender, nondistended with good bowel sounds heard  EXTREMITIES:  Without cyanosis, clubbing or edema.   NEUROLOGICAL: dysarthria   slight flattening of left nasolabial fold, mild dysarthria, tongue protrudes in the midline. coordination: +dysmetria on left finger to nose    Labs:     CBC Full  -  ( 2022 07:59 )  WBC Count : 15.14 K/uL  RBC Count : 5.33 M/uL  Hemoglobin : 16.1 g/dL  Hematocrit : 48.2 %  Platelet Count - Automated : 191 K/uL  Mean Cell Volume : 90.4 fl  Mean Cell Hemoglobin : 30.2 pg  Mean Cell Hemoglobin Concentration : 33.4 gm/dL  Auto Neutrophil # : x  Auto Lymphocyte # : x  Auto Monocyte # : x  Auto Eosinophil # : x  Auto Basophil # : x  Auto Neutrophil % : x  Auto Lymphocyte % : x  Auto Monocyte % : x  Auto Eosinophil % : x  Auto Basophil % : x      Urinalysis Basic - ( 15 Apr 2022 10:45 )    Color: Yellow / Appearance: Clear / S.020 / pH: x  Gluc: x / Ketone: Trace  / Bili: Negative / Urobili: Negative   Blood: x / Protein: Negative / Nitrite: Negative   Leuk Esterase: Negative / RBC: x / WBC x   Sq Epi: x / Non Sq Epi: x / Bacteria: x          135  |  104  |  27<H>  ----------------------------<  101<H>  3.7   |  23  |  0.95    Ca    9.4      2022 08:31    TPro  6.6  /  Alb  3.0<L>  /  TBili  2.1<H>  /  DBili  x   /  AST  21  /  ALT  31  /  AlkPhos  89  -      # Acute right CVA, with hemorrhagic transformation. ? Embolic d/t new onset Afib  - CT scans reviewed  - Patient has been in the hospital since , I want to discyss with Dr. Edwards -  if anticoagulation can be started and when?  - Repeat head CT on   - BPs at goal  - Continue statin / hold AC and anti-platelet therapy  - I want to discuss care with Dr. Edwards in regards of starting anticoagulation and when that can be started    # New onset a-fib (patient poor rhythm control with good HR control)  - continues to be  atrial fibrillation -  night goes to 40s, frequent PVCs, HR 60-80s.   - d/c dig as patient's dig level is elevated  - EF on echo - 15%.  - Have to hold AC for now  - eventual ischemia workup  - Cardiology is following.    # CHF. Severely reduced LVEF of 15%- ischemic vs reversible cardiomyopathy . Will hold off on ACEI for now and will increase betablocker to better rate control  - eventually need a cath or perfusion study with viability    - patient's cardiomyopathy secondary to   - Will eventually need a cath or perfusion study with viability when clinical status improves   - ? cath on friday    # Leukocytosis -  patient might be microaspiration   -  patient re-evaluation asked to speech and swallow  - patient does not appears to have any fevers, chills or shakes  - no signs of infection /  if any fevers, chills or shakes -  will initiate infectious workup    # (+) Troponin Likely demand in setting of CVA, afib   - Medical mgmt. as above.   - Only one set positive and later sets negative.

## 2022-04-20 NOTE — PROGRESS NOTE ADULT - ASSESSMENT
62 yr old M with no PMHX was brought to ED by EMS on 4/11/22 s/p fall for LE weakness. Patient states at 5:30 am this morning he got up to turn off the TV when his leg gave way and he fell. Pt's sister lives downstairs and heard the fall; she went upstairs and helped him up to a chair, but then pt got up and fell again. Pt's sister was concerned he was having a stroke so she called EMS.     In ED the patient was evaluated, and code stroke was called. CTH showed no acute infarct or hemorrhage. CTA head and neck showed no LVO or stenosis or aneurysms. CT perfusion showed no core infarct. IV TPA was not given because LKN was last night, NIHSS 4-5. Pt was given 2 baby ASA. He was found to have new onset Afib in ED, and upon my evaluation his BP was elevated at 156/118.    #Acute right basal ganglia CVA, with hemorrhagic transformation, likely embolic. Patient also with new onset Afib.    -No surgical intervention at this time.  -Can start baby ASA  -Neurosurgery is following  -Keep SBP < 140  -Continue statin    # New onset a-fib  -EF on echo ~ 15%.  -Have to hold AC for now  -Cardiology is following.    Patient was discussed with Dr. Villagomez

## 2022-04-20 NOTE — PROGRESS NOTE ADULT - SUBJECTIVE AND OBJECTIVE BOX
HPI:  62 yr old M with no PMHX was brought to ED by EMS on 4/11/22 s/p fall for LE weakness. Patient states at 5:30 am this morning he got up to turn off the TV when his leg gave way and he fell. Pt's sister lives downstairs and heard the fall; she went upstairs and helped him up to a chair, but then pt got up and fell again. Pt's sister was concerned he was having a stroke so she called EMS.   In ED the patient was evaluated, and code stroke was called. CTH showed no acute infarct or hemorrhage. CTA head and neck showed no LVO or stenosis or aneurysms. CT perfusion showed no core infarct. IV TPA was not given because LKN was last night, NIHSS 3. Pt was given 2 baby ASA. He was found to have new onset Afib in ED.  He has not seen an MD in many years.    4/19/22:  Pt seen sitting OOB in chair eating breakfast in [facility], offers no c/o, wants to go to rehab.  Pt had fall out of chair on 4/17 requiring sutures to left forehead laceration.  TELE:  atrial fib rate controlled, 70-80 bpm, overnight occasional pauses 2-2.6 seconds.  No pauses >3 seconds    4/20/22: pt in NAD  TELE: AF 70-90 bpm    MEDICATIONS  (STANDING):  atorvastatin 80 milliGRAM(s) Oral at bedtime  chlorhexidine 4% Liquid 1 Application(s) Topical <User Schedule>  digoxin     Tablet 125 MICROGram(s) Oral daily  furosemide    Tablet 20 milliGRAM(s) Oral daily  metoprolol tartrate 50 milliGRAM(s) Oral two times a day    MEDICATIONS  (PRN):    Allergies    No Known Allergies    Vital Signs Last 24 Hrs  T(C): 37.2 (20 Apr 2022 09:20), Max: 37.2 (20 Apr 2022 09:20)  T(F): 98.9 (20 Apr 2022 09:20), Max: 98.9 (20 Apr 2022 09:20)  HR: 72 (20 Apr 2022 09:20) (72 - 88)  BP: 105/67 (20 Apr 2022 09:20) (101/79 - 119/97)  BP(mean): --  RR: 19 (20 Apr 2022 09:20) (18 - 19)  SpO2: 98% (20 Apr 2022 09:20) (98% - 100%)                          15.0   13.13 )-----------( 178      ( 19 Apr 2022 08:28 )             44.6       04-19    135  |  103  |  28<H>  ----------------------------<  110<H>  4.2   |  26  |  1.06    Ca    9.2      19 Apr 2022 08:28    TPro  6.9  /  Alb  2.8<L>  /  TBili  1.8<H>  /  DBili  x   /  AST  24  /  ALT  36  /  AlkPhos  99  04-19      PHYSICAL EXAMINATION:    GENERAL APPEARANCE:  Pt. is not currently dyspneic, in no distress. Pt. is alert, oriented, and pleasant.  HEENT:  Pupils are normal and react normally. No icterus.  Surtures above left eyebrow are intact.  NECK:  Supple. No lymphadenopathy. Jugular venous pressure not elevated. Carotids equal.   HEART:   Irregular S1S2.  There are no murmurs, rubs or gallops noted  CHEST:  Chest is clear to auscultation. Normal respiratory effort.  ABDOMEN:  Soft and nontender.   EXTREMITIES:  There is no edema.   SKIN:  No rash or significant lesions are noted.      RADIOLOGY & ADDITIONAL TESTS:    < from: TTE Echo Complete w/o Contrast w/ Doppler (04.12.22 @ 09:02) >   Impression     Summary     Moderate (2+) mitral regurgitation is present.   Normal aortic valve structure and function.   Moderate (2+) tricuspid valve regurgitation is present.   The left ventricle cavity is mildly dilated.   Estimated left ventricular ejection fraction is 15 %.   Severe, diffuse hypokinesis of the left ventricle is present.   The right ventricle exhibits mild diffuse hypokinesis, and mild   depression   of contractility.   Pleural effusion - is present.      < from: CT Head No Cont (04.17.22 @ 10:53) >  IMPRESSION:  Evolving right basal ganglia region hemorrhagic infarct without   significant change from the prior exam.    No new intracranial hemorrhage.        < from: MR Head No Cont (04.12.22 @ 09:44) >  IMPRESSION:   Acute infarction within the anterior RIGHT basal ganglia   and straightening restricted diffusion and central hemorrhage measuring   2.4 cm, with mass effect on the anterior horn of the RIGHT lateral   ventricle. Smaller acute infarctions involve the anterior inferior RIGHT   frontal lobe, the posterior RIGHT basal ganglia and posterior RIGHT   insular cortex as well as the posterior RIGHT temporal on lateral RIGHT   occipital cortex,  with restricted diffusion.

## 2022-04-20 NOTE — PROGRESS NOTE ADULT - ASSESSMENT
62 yr old M with no PMHX was brought to ED by EMS on 4/11/22 s/p fall for LE weakness, admitted for CVA complicated by hemorrhagic conversion and new onset afib.    A/P:  Acute CVA, hemorrhagic.  New onset Atrial fibrillation. HFrEF  Continue tele monitoring.  HR improved from prior, rates 70-90, no significant pauses.  Continue betablocker, dose incresased by cardiology.   Continue digoxin  No OAC at this time due to hemorrhagic transformation.  LVEF 15%, Patient will require ischemia evaluation/cath when clinically appropriate.  Discussed with Dr. Roy, RN, and patient.

## 2022-04-21 LAB
ALBUMIN SERPL ELPH-MCNC: 3 G/DL — LOW (ref 3.3–5)
ALP SERPL-CCNC: 99 U/L — SIGNIFICANT CHANGE UP (ref 40–120)
ALT FLD-CCNC: 34 U/L — SIGNIFICANT CHANGE UP (ref 12–78)
ANION GAP SERPL CALC-SCNC: 6 MMOL/L — SIGNIFICANT CHANGE UP (ref 5–17)
AST SERPL-CCNC: 23 U/L — SIGNIFICANT CHANGE UP (ref 15–37)
BILIRUB SERPL-MCNC: 1.7 MG/DL — HIGH (ref 0.2–1.2)
BUN SERPL-MCNC: 28 MG/DL — HIGH (ref 7–23)
CALCIUM SERPL-MCNC: 9.4 MG/DL — SIGNIFICANT CHANGE UP (ref 8.5–10.1)
CHLORIDE SERPL-SCNC: 104 MMOL/L — SIGNIFICANT CHANGE UP (ref 96–108)
CO2 SERPL-SCNC: 28 MMOL/L — SIGNIFICANT CHANGE UP (ref 22–31)
CREAT SERPL-MCNC: 1.04 MG/DL — SIGNIFICANT CHANGE UP (ref 0.5–1.3)
EGFR: 80 ML/MIN/1.73M2 — SIGNIFICANT CHANGE UP
GLUCOSE SERPL-MCNC: 125 MG/DL — HIGH (ref 70–99)
HCT VFR BLD CALC: 44.3 % — SIGNIFICANT CHANGE UP (ref 39–50)
HGB BLD-MCNC: 15 G/DL — SIGNIFICANT CHANGE UP (ref 13–17)
MCHC RBC-ENTMCNC: 30.1 PG — SIGNIFICANT CHANGE UP (ref 27–34)
MCHC RBC-ENTMCNC: 33.9 GM/DL — SIGNIFICANT CHANGE UP (ref 32–36)
MCV RBC AUTO: 89 FL — SIGNIFICANT CHANGE UP (ref 80–100)
PLATELET # BLD AUTO: 242 K/UL — SIGNIFICANT CHANGE UP (ref 150–400)
POTASSIUM SERPL-MCNC: 4 MMOL/L — SIGNIFICANT CHANGE UP (ref 3.5–5.3)
POTASSIUM SERPL-SCNC: 4 MMOL/L — SIGNIFICANT CHANGE UP (ref 3.5–5.3)
PROT SERPL-MCNC: 7 GM/DL — SIGNIFICANT CHANGE UP (ref 6–8.3)
RBC # BLD: 4.98 M/UL — SIGNIFICANT CHANGE UP (ref 4.2–5.8)
RBC # FLD: 12.9 % — SIGNIFICANT CHANGE UP (ref 10.3–14.5)
SODIUM SERPL-SCNC: 138 MMOL/L — SIGNIFICANT CHANGE UP (ref 135–145)
WBC # BLD: 11.24 K/UL — HIGH (ref 3.8–10.5)
WBC # FLD AUTO: 11.24 K/UL — HIGH (ref 3.8–10.5)

## 2022-04-21 PROCEDURE — 99232 SBSQ HOSP IP/OBS MODERATE 35: CPT

## 2022-04-21 RX ADMIN — Medication 50 MILLIGRAM(S): at 21:34

## 2022-04-21 RX ADMIN — CHLORHEXIDINE GLUCONATE 1 APPLICATION(S): 213 SOLUTION TOPICAL at 05:40

## 2022-04-21 RX ADMIN — ATORVASTATIN CALCIUM 80 MILLIGRAM(S): 80 TABLET, FILM COATED ORAL at 21:34

## 2022-04-21 RX ADMIN — Medication 20 MILLIGRAM(S): at 10:14

## 2022-04-21 RX ADMIN — Medication 125 MICROGRAM(S): at 10:14

## 2022-04-21 RX ADMIN — Medication 50 MILLIGRAM(S): at 10:14

## 2022-04-21 NOTE — PROGRESS NOTE ADULT - ASSESSMENT
63 yo male with no PMHX was brought to ED by EMS on 4/11/22 s/p fall for LE weakness, admitted for CVA complicated by hemorrhagic conversion and new onset afib, and echo with new severely reduced LVEF    New onset Atrial fibrillation, HFrEF  remains in afib - cannot receive oac due to hemorrhagic transformation of CVA, therefore cannot pursue rhythm control strategy for afib  heart rate controlled with metoprolol 50bid and digoxin 125mcg - will continue current dose  new LVEF 15%-- ischemic work up pending   HF management per general cardiology    no ventricular ectopy noted on tele - in the event of non-ischemic etiology will need 3months of optimal GDMT  and re-evaluation of LVEF

## 2022-04-21 NOTE — CHART NOTE - NSCHARTNOTEFT_GEN_A_CORE
[facility] risks, benefits and alternatives discussed with patient. Risk discussed included, but not limited to MI, stroke, mortality, major bleeding, arrythmia, or infection. Consent obtained and signed educational material provided. Pt. verbalizes and understands pre-procedural instructions.  ASA:  Bleeding Risk Score:  Creatinine: 1.04  GFR: 80  Pete score   NS 250cc bolus IV x1 ordered [facility] risks, benefits and alternatives discussed with patient. Risk discussed included, but not limited to MI, stroke, mortality, major bleeding, arrythmia, or infection. Consent obtained and signed educational material provided. Pt. verbalizes and understands pre-procedural instructions.  ASA:  Bleeding Risk Score: 1.04  Creatinine: 1.04  GFR: 80  Pete score 1pt.  NS 250cc bolus IV x1 ordered [facility] risks, benefits and alternatives discussed with patient. Risk discussed included, but not limited to MI, stroke, mortality, major bleeding, arrythmia, or infection. Consent obtained and signed educational material provided. Pt. verbalizes and understands pre-procedural instructions.  ASA: II  Bleeding Risk Score: 1.3  Creatinine: 1.04  GFR: 80  Pete score 1pt.  NS 250cc bolus IV x1 ordered

## 2022-04-21 NOTE — PROGRESS NOTE ADULT - SUBJECTIVE AND OBJECTIVE BOX
Patient is a 63 y/o/m w/ PMHx, admitted in the ED 4/11/22 s/p fall for LE weakness. Patient states at 5:30 am this morning he got up to turn off the TV when his leg gave way and he fell. Pt's sister lives downstairs and heard the fall; she went upstairs and helped him up to a chair, but then pt got up and fell again. Pt's sister was concerned he was having a stroke so she called EMS. s/p code stroke in the ED.      In ED the patient was evaluated, and code stroke was called. CTH showed no acute infarct or hemorrhage. CTA head and neck showed no LVO or stenosis or aneurysms. CT perfusion showed no core infarct. IV TPA was not given because LKN was last night, NIHSS 4-5. Pt was given 2 baby ASA. He was found to have new onset Afib in ED, and upon my evaluation his BP was elevated at 156/118.    Medical progress: Patient seen and eval. Deconditioned and frail. Coughing is better. no fevers, chills or shakes. Care discussed with Neuro /  cardiology.  Complaints: coughing  State of mind:  normal /  slow  Plan: Cardiac cath in the am      REVIEW OF SYSTEMS:  Poor historian    Physical Exam:   GENERAL APPEARANCE: + weakness  T(C): 36.3 (21 Apr 2022 08:42), Max: 36.7 (20 Apr 2022 15:53)  T(F): 97.3 (21 Apr 2022 08:42), Max: 98 (20 Apr 2022 15:53)  HR: 72 (21 Apr 2022 08:42) (72 - 83)  BP: 109/71 (21 Apr 2022 08:42) (106/69 - 110/82)  RR: 18 (21 Apr 2022 08:42) (18 - 19)  SpO2: 98% (21 Apr 2022 08:42) (98% - 100%)  HEENT:  Head is normocephalic    Skin:  Warm and dry without any rash   NECK:  Supple without lymphadenopathy.   HEART:  Regular rate and rhythm. normal S1 and S2   LUNGS:  Good ins/exp effort, no W/R/R/C  ABDOMEN:  Soft, nontender, nondistended with good bowel sounds heard  EXTREMITIES:  Without cyanosis, clubbing or edema.   NEUROLOGICAL: dysarthria   slight flattening of left nasolabial fold, mild dysarthria, tongue protrudes in the midline. coordination: +dysmetria on left finger to nose    Labs:     CBC Full  -  ( 21 Apr 2022 08:17 )  WBC Count : 11.24 K/uL  RBC Count : 4.98 M/uL  Hemoglobin : 15.0 g/dL  Hematocrit : 44.3 %  Platelet Count - Automated : 242 K/uL  Mean Cell Volume : 89.0 fl  Mean Cell Hemoglobin : 30.1 pg  Mean Cell Hemoglobin Concentration : 33.9 gm/dL  Auto Neutrophil # : x  Auto Lymphocyte # : x  Auto Monocyte # : x  Auto Eosinophil # : x  Auto Basophil # : x  Auto Neutrophil % : x  Auto Lymphocyte % : x  Auto Monocyte % : x  Auto Eosinophil % : x  Auto Basophil % : x        04-21    138  |  104  |  28<H>  ----------------------------<  125<H>  4.0   |  28  |  1.04    Ca    9.4      21 Apr 2022 08:17    TPro  7.0  /  Alb  3.0<L>  /  TBili  1.7<H>  /  DBili  x   /  AST  23  /  ALT  34  /  AlkPhos  99  04-21        # Acute right CVA, with hemorrhagic transformation. ? Embolic d/t new onset Afib  - CT scans reviewed  - Patient has been in the hospital since 4/11, I want to discyss with Dr. Edwards -  if anticoagulation can be started and when?  - Repeat head CT on 4/16  - BPs at goal  - Continue statin / hold AC and anti-platelet therapy  - I want to discuss care with Dr. Edwards in regards of starting anticoagulation and when that can be started    # New onset a-fib (patient poor rhythm control with good HR control)  - continues to be  atrial fibrillation -  night goes to 40s, frequent PVCs, HR 60-80s.   - d/c dig as patient's dig level is elevated  - EF on echo - 15%.  - Have to hold AC for now  - eventual ischemia workup  - Cardiology is following.    # CHF. Severely reduced LVEF of 15%- ischemic vs reversible cardiomyopathy . Will hold off on ACEI for now and will increase betablocker to better rate control  - ischemia evaluation in the am  - eventually need a cath or perfusion study with viability    - patient's cardiomyopathy secondary to   - Will eventually need a cath or perfusion study with viability when clinical status improves   - ? cath on friday    # Leukocytosis -  patient might be microaspiration   - 4/20 patient re-evaluation asked to speech and swallow  - patient does not appears to have any fevers, chills or shakes  - no signs of infection /  if any fevers, chills or shakes -  will initiate infectious workup    # (+) Troponin Likely demand in setting of CVA, afib   - Medical mgmt. as above.   - Only one set positive and later sets negative.

## 2022-04-21 NOTE — PROGRESS NOTE ADULT - SUBJECTIVE AND OBJECTIVE BOX
HPI: 62 yr old M with no PMHX was brought to ED by EMS on 4/11/22 s/p fall for LE weakness. Patient states at 5:30 am this morning he got up to turn off the TV when his leg gave way and he fell. Pt's sister lives downstairs and heard the fall; she went upstairs and helped him up to a chair, but then pt got up and fell again. Pt's sister was concerned he was having a stroke so she called EMS.   In ED the patient was evaluated, and code stroke was called. CTH showed no acute infarct or hemorrhage. CTA head and neck showed no LVO or stenosis or aneurysms. CT perfusion showed no core infarct. IV TPA was not given because LKN was last night, NIHSS 3. Pt was given 2 baby ASA. He was found to have new onset Afib in ED.  He has not seen an MD in many years.      Subjective:       TELE:  afib average rates 70-80bpm, brief paroxysms of RVR to 120-130bpm    MEDICATIONS  (STANDING):  atorvastatin 80 milliGRAM(s) Oral at bedtime  chlorhexidine 4% Liquid 1 Application(s) Topical <User Schedule>  digoxin     Tablet 125 MICROGram(s) Oral daily  furosemide    Tablet 20 milliGRAM(s) Oral daily  metoprolol tartrate 50 milliGRAM(s) Oral two times a day    MEDICATIONS  (PRN):      Allergies    No Known Allergies    Vital Signs Last 24 Hrs  T(C): 36.3 (21 Apr 2022 08:42), Max: 36.7 (20 Apr 2022 15:53)  T(F): 97.3 (21 Apr 2022 08:42), Max: 98 (20 Apr 2022 15:53)  HR: 72 (21 Apr 2022 08:42) (72 - 83)  BP: 109/71 (21 Apr 2022 08:42) (106/69 - 110/82)  BP(mean): --  RR: 18 (21 Apr 2022 08:42) (18 - 19)  SpO2: 98% (21 Apr 2022 08:42) (98% - 100%)    PHYSICAL EXAMINATION:  GENERAL: In no apparent distress, well nourished, and hydrated.  HEAD:  Atraumatic, Normocephalic  EYES: EOMI, PERRLA, conjunctiva and sclera clear  ENMT: No tonsillar erythema, exudates, or enlargement; Moist mucous membranes, Good dentition, No lesions  NECK: Supple and normal thyroid.  No JVD or carotid bruit.  Carotid pulse is 2+ bilaterally.  HEART: Regular rate and rhythm; No murmurs, rubs, or gallops.  PULMONARY: Clear to auscultation and perfusion.  No rales, wheezing, or rhonchi bilaterally.  ABDOMEN: Soft, Nontender, Nondistended; Bowel sounds present  EXTREMITIES:  2+ Peripheral Pulses, No clubbing, cyanosis, or edema  LYMPH: No lymphadenopathy noted  NEUROLOGICAL: Grossly nonfocal    LABS:                        15.0   11.24 )-----------( 242      ( 21 Apr 2022 08:17 )             44.3     04-21    138  |  104  |  28<H>  ----------------------------<  125<H>  4.0   |  28  |  1.04    Ca    9.4      21 Apr 2022 08:17    TPro  7.0  /  Alb  3.0<L>  /  TBili  1.7<H>  /  DBili  x   /  AST  23  /  ALT  34  /  AlkPhos  99  04-21        RADIOLOGY & ADDITIONAL TESTS:    ECHO 4/12/2022     Summary     Moderate (2+) mitral regurgitation is present.   Normal aortic valve structure and function.   Moderate (2+) tricuspid valve regurgitation is present.   The left ventricle cavity is mildly dilated.   Estimated left ventricular ejection fraction is 15 %.   Severe, diffuse hypokinesis of the left ventricle is present.   The right ventricle exhibits mild diffuse hypokinesis, and mild   depression   of contractility.   Pleural effusion - is present.     Signature     ----------------------------------------------------------------   Electronically signed by Anders Marcelino MD(Interpreting   physician) on 04/12/2022 10:55 AM   ----------------------------------------------------------------   HPI: 62 yr old M with no PMHX was brought to ED by EMS on 4/11/22 s/p fall for LE weakness. Patient states at 5:30 am this morning he got up to turn off the TV when his leg gave way and he fell. Pt's sister lives downstairs and heard the fall; she went upstairs and helped him up to a chair, but then pt got up and fell again. Pt's sister was concerned he was having a stroke so she called EMS.   In ED the patient was evaluated, and code stroke was called. CTH showed no acute infarct or hemorrhage. CTA head and neck showed no LVO or stenosis or aneurysms. CT perfusion showed no core infarct. IV TPA was not given because LKN was last night, NIHSS 3. Pt was given 2 baby ASA. He was found to have new onset Afib in ED.  He has not seen an MD in many years.      Subjective: pt examined sitting up in chair, without any acute distress, c/o being exhausted mentally with everything that's going on with him.  otherwise denies any chest pain, sob, dizziness or palpitations.       TELE:  afib average rates 70-80bpm, brief paroxysms of RVR to 120-130bpm    MEDICATIONS  (STANDING):  atorvastatin 80 milliGRAM(s) Oral at bedtime  chlorhexidine 4% Liquid 1 Application(s) Topical <User Schedule>  digoxin     Tablet 125 MICROGram(s) Oral daily  furosemide    Tablet 20 milliGRAM(s) Oral daily  metoprolol tartrate 50 milliGRAM(s) Oral two times a day    MEDICATIONS  (PRN):      Allergies    No Known Allergies    Vital Signs Last 24 Hrs  T(C): 36.3 (21 Apr 2022 08:42), Max: 36.7 (20 Apr 2022 15:53)  T(F): 97.3 (21 Apr 2022 08:42), Max: 98 (20 Apr 2022 15:53)  HR: 72 (21 Apr 2022 08:42) (72 - 83)  BP: 109/71 (21 Apr 2022 08:42) (106/69 - 110/82)  BP(mean): --  RR: 18 (21 Apr 2022 08:42) (18 - 19)  SpO2: 98% (21 Apr 2022 08:42) (98% - 100%)    PHYSICAL EXAMINATION:  GENERAL: In no apparent distress, well nourished, and hydrated.  HEAD:  Atraumatic, Normocephalic  EYES: EOMI, PERRLA, conjunctiva and sclera clear  ENMT: No tonsillar erythema, exudates, or enlargement; Moist mucous membranes, Good dentition, No lesions  NECK: Supple and normal thyroid.  No JVD or carotid bruit.  Carotid pulse is 2+ bilaterally.  HEART: Irregular; No murmurs, rubs, or gallops.  PULMONARY: Clear to auscultation and perfusion.  No rales, wheezing, or rhonchi bilaterally.  ABDOMEN: Soft, Nontender, Nondistended; Bowel sounds present  EXTREMITIES:  2+ Peripheral Pulses, No clubbing, cyanosis, or edema  LYMPH: No lymphadenopathy noted  NEUROLOGICAL: Grossly nonfocal    LABS:                        15.0   11.24 )-----------( 242      ( 21 Apr 2022 08:17 )             44.3     04-21    138  |  104  |  28<H>  ----------------------------<  125<H>  4.0   |  28  |  1.04    Ca    9.4      21 Apr 2022 08:17    TPro  7.0  /  Alb  3.0<L>  /  TBili  1.7<H>  /  DBili  x   /  AST  23  /  ALT  34  /  AlkPhos  99  04-21        RADIOLOGY & ADDITIONAL TESTS:    ECHO 4/12/2022     Summary     Moderate (2+) mitral regurgitation is present.   Normal aortic valve structure and function.   Moderate (2+) tricuspid valve regurgitation is present.   The left ventricle cavity is mildly dilated.   Estimated left ventricular ejection fraction is 15 %.   Severe, diffuse hypokinesis of the left ventricle is present.   The right ventricle exhibits mild diffuse hypokinesis, and mild   depression   of contractility.   Pleural effusion - is present.     Signature     ----------------------------------------------------------------   Electronically signed by Anders Marcelino MD(Interpreting   physician) on 04/12/2022 10:55 AM   ----------------------------------------------------------------

## 2022-04-22 PROBLEM — Z00.00 ENCOUNTER FOR PREVENTIVE HEALTH EXAMINATION: Status: ACTIVE | Noted: 2022-04-22

## 2022-04-22 LAB
HCT VFR BLD CALC: 48.1 % — SIGNIFICANT CHANGE UP (ref 39–50)
HGB BLD-MCNC: 16.1 G/DL — SIGNIFICANT CHANGE UP (ref 13–17)
MCHC RBC-ENTMCNC: 29.6 PG — SIGNIFICANT CHANGE UP (ref 27–34)
MCHC RBC-ENTMCNC: 33.5 GM/DL — SIGNIFICANT CHANGE UP (ref 32–36)
MCV RBC AUTO: 88.4 FL — SIGNIFICANT CHANGE UP (ref 80–100)
PLATELET # BLD AUTO: 259 K/UL — SIGNIFICANT CHANGE UP (ref 150–400)
RBC # BLD: 5.44 M/UL — SIGNIFICANT CHANGE UP (ref 4.2–5.8)
RBC # FLD: 12.9 % — SIGNIFICANT CHANGE UP (ref 10.3–14.5)
WBC # BLD: 11.96 K/UL — HIGH (ref 3.8–10.5)
WBC # FLD AUTO: 11.96 K/UL — HIGH (ref 3.8–10.5)

## 2022-04-22 PROCEDURE — 70450 CT HEAD/BRAIN W/O DYE: CPT | Mod: 26

## 2022-04-22 PROCEDURE — 99233 SBSQ HOSP IP/OBS HIGH 50: CPT

## 2022-04-22 RX ORDER — ACETAMINOPHEN 500 MG
650 TABLET ORAL EVERY 6 HOURS
Refills: 0 | Status: DISCONTINUED | OUTPATIENT
Start: 2022-04-22 | End: 2022-04-28

## 2022-04-22 RX ORDER — LISINOPRIL 2.5 MG/1
2.5 TABLET ORAL DAILY
Refills: 0 | Status: DISCONTINUED | OUTPATIENT
Start: 2022-04-22 | End: 2022-04-24

## 2022-04-22 RX ORDER — SODIUM CHLORIDE 9 MG/ML
1000 INJECTION INTRAMUSCULAR; INTRAVENOUS; SUBCUTANEOUS
Refills: 0 | Status: DISCONTINUED | OUTPATIENT
Start: 2022-04-22 | End: 2022-04-22

## 2022-04-22 RX ORDER — APIXABAN 2.5 MG/1
5 TABLET, FILM COATED ORAL EVERY 12 HOURS
Refills: 0 | Status: DISCONTINUED | OUTPATIENT
Start: 2022-04-22 | End: 2022-04-22

## 2022-04-22 RX ADMIN — CHLORHEXIDINE GLUCONATE 1 APPLICATION(S): 213 SOLUTION TOPICAL at 05:36

## 2022-04-22 RX ADMIN — LISINOPRIL 2.5 MILLIGRAM(S): 2.5 TABLET ORAL at 12:24

## 2022-04-22 RX ADMIN — Medication 650 MILLIGRAM(S): at 21:32

## 2022-04-22 RX ADMIN — ATORVASTATIN CALCIUM 80 MILLIGRAM(S): 80 TABLET, FILM COATED ORAL at 21:32

## 2022-04-22 RX ADMIN — Medication 650 MILLIGRAM(S): at 22:48

## 2022-04-22 RX ADMIN — Medication 125 MICROGRAM(S): at 12:24

## 2022-04-22 NOTE — PROGRESS NOTE ADULT - SUBJECTIVE AND OBJECTIVE BOX
HPI:  62 yr old M with no PMHX was brought to ED by EMS on 4/11/22 s/p fall for LE weakness. Patient states at 5:30 am this morning he got up to turn off the TV when his leg gave way and he fell. Pt's sister lives downstairs and heard the fall; she went upstairs and helped him up to a chair, but then pt got up and fell again. Pt's sister was concerned he was having a stroke so she called EMS.   In ED the patient was evaluated, and code stroke was called. CTH showed no acute infarct or hemorrhage. CTA head and neck showed no LVO or stenosis or aneurysms. CT perfusion showed no core infarct. IV TPA was not given because LKN was last night, NIHSS 3. Pt was given 2 baby ASA. He was found to have new onset Afib in ED.  He has not seen an MD in many years.    4/22/22: Patient s/p [facility] with non obstructive CAD.  Patient without complaints, no events overnight  TELE: Afib 50-90    MEDICATIONS  (STANDING):  apixaban 5 milliGRAM(s) Oral every 12 hours  atorvastatin 80 milliGRAM(s) Oral at bedtime  chlorhexidine 4% Liquid 1 Application(s) Topical <User Schedule>  digoxin     Tablet 125 MICROGram(s) Oral daily  lisinopril 2.5 milliGRAM(s) Oral daily  metoprolol tartrate 50 milliGRAM(s) Oral two times a day    MEDICATIONS  (PRN):    Allergies    No Known Allergies    Vital Signs Last 24 Hrs  T(C): 36.3 (22 Apr 2022 11:42), Max: 36.7 (21 Apr 2022 17:12)  T(F): 97.3 (22 Apr 2022 11:42), Max: 98 (21 Apr 2022 17:12)  HR: 83 (22 Apr 2022 11:42) (58 - 83)  BP: 128/66 (22 Apr 2022 11:42) (94/55 - 134/97)  BP(mean): 88 (22 Apr 2022 06:30) (88 - 88)  RR: 18 (22 Apr 2022 11:42) (16 - 19)  SpO2: 100% (22 Apr 2022 11:42) (97% - 100%)                          PHYSICAL EXAMINATION:    GENERAL APPEARANCE:  Pt. is not currently dyspneic, in no distress. Pt. is alert, oriented, and pleasant.  HEENT:  Pupils are normal and react normally. No icterus.  Surtures above left eyebrow are intact.  NECK:  Supple. No lymphadenopathy. Jugular venous pressure not elevated. Carotids equal.   HEART:   Irregular S1S2.  There are no murmurs, rubs or gallops noted  CHEST:  Chest is clear to auscultation. Normal respiratory effort.  ABDOMEN:  Soft and nontender.   EXTREMITIES:  There is no edema.   SKIN:  No rash or significant lesions are noted.    Labs:  04-21    138  |  104  |  28<H>  ----------------------------<  125<H>  4.0   |  28  |  1.04    Ca    9.4      21 Apr 2022 08:17    TPro  7.0  /  Alb  3.0<L>  /  TBili  1.7<H>  /  DBili  x   /  AST  23  /  ALT  34  /  AlkPhos  99  04-21                          16.1   11.96 )-----------( 259      ( 22 Apr 2022 09:00 )             48.1       RADIOLOGY & ADDITIONAL TESTS:    < from: TTE Echo Complete w/o Contrast w/ Doppler (04.12.22 @ 09:02) >   Impression     Summary     Moderate (2+) mitral regurgitation is present.   Normal aortic valve structure and function.   Moderate (2+) tricuspid valve regurgitation is present.   The left ventricle cavity is mildly dilated.   Estimated left ventricular ejection fraction is 15 %.   Severe, diffuse hypokinesis of the left ventricle is present.   The right ventricle exhibits mild diffuse hypokinesis, and mild   depression   of contractility.   Pleural effusion - is present.      < from: CT Head No Cont (04.17.22 @ 10:53) >  IMPRESSION:  Evolving right basal ganglia region hemorrhagic infarct without   significant change from the prior exam.    No new intracranial hemorrhage.        < from: MR Head No Cont (04.12.22 @ 09:44) >  IMPRESSION:   Acute infarction within the anterior RIGHT basal ganglia   and straightening restricted diffusion and central hemorrhage measuring   2.4 cm, with mass effect on the anterior horn of the RIGHT lateral   ventricle. Smaller acute infarctions involve the anterior inferior RIGHT   frontal lobe, the posterior RIGHT basal ganglia and posterior RIGHT   insular cortex as well as the posterior RIGHT temporal on lateral RIGHT   occipital cortex,  with restricted diffusion.

## 2022-04-22 NOTE — CHART NOTE - NSCHARTNOTEFT_GEN_A_CORE
62 yr old M with no PMHX was brought to ED by EMS on 4/11/22 s/p fall for LE weakness. CT head showed no acute infarct or hemorrhage. CTA head and neck showed no LVO or stenosis or aneurysms. CT perfusion showed no core infarct. IV TPA was not given because LKN was last night, NIHSS 4-5. Pt was given 2 baby ASA. He was found to have new onset Afib in ED. MRI Brain later revealed Acute right CVA, with hemorrhagic conversion    #Acute stroke with hemorrhagic conversion, stable on repeat CT head   #New-onset A-fib      Plan:  - No acute neurosurgical intervention  - No absolute contraindications for starting anticoagulation however would weight the risks/benefits prior to starting in this patient with intracerebral hemorrhage  - Would recommend routine follow up Head CT prior to starting anticoagulation    Discussed with Dr. Burkett

## 2022-04-22 NOTE — PROGRESS NOTE ADULT - SUBJECTIVE AND OBJECTIVE BOX
Patient is a 62y old  Male who presents with a chief complaint of Left sided weakness (14 Apr 2022 16:35)    4/15- lethargic but arousable , denies HA   tele shows rate controlled afib     4/18 examined at bedsite, still feels fatigue, off Dig secondary to elevated Dig level    4/20 Examined at bedsite, HR better controlled with Dig, improvement on the left side    4/22 S/P C non-obstructive coronary artery disease.    MEDICATIONS  (STANDING):  atorvastatin 80 milliGRAM(s) Oral at bedtime  chlorhexidine 4% Liquid 1 Application(s) Topical <User Schedule>  furosemide   Injectable 20 milliGRAM(s) IV Push daily  metoprolol tartrate 50 milliGRAM(s) Oral two times a day    MEDICATIONS  (PRN):            Vital Signs Last 24 Hrs  T(C): 36.8 (15 Apr 2022 04:00), Max: 36.9 (14 Apr 2022 21:24)  T(F): 98.2 (15 Apr 2022 04:00), Max: 98.5 (14 Apr 2022 21:24)  HR: 99 (15 Apr 2022 04:00) (90 - 111)  BP: 132/78 (15 Apr 2022 04:00) (108/78 - 132/78)  BP(mean): 88 (15 Apr 2022 04:00) (88 - 88)  RR: 18 (15 Apr 2022 04:00) (18 - 19)  SpO2: 96% (15 Apr 2022 04:00) (92% - 98%)            INTERPRETATION OF TELEMETRY:    ECG:        LABS:                        16.0   13.90 )-----------( 180      ( 14 Apr 2022 08:05 )             45.1     04-14    135  |  105  |  18  ----------------------------<  107<H>  3.7   |  24  |  0.94    Ca    8.9      14 Apr 2022 08:05              I&O's Summary    14 Apr 2022 07:01  -  15 Apr 2022 07:00  --------------------------------------------------------  IN: 0 mL / OUT: 300 mL / NET: -300 mL      BNP  RADIOLOGY & ADDITIONAL STUDIES:

## 2022-04-22 NOTE — PROGRESS NOTE ADULT - SUBJECTIVE AND OBJECTIVE BOX
Nurse Practitioner Progress note:     HPI: 61 y/o male (haven't seen a physician or had wellness check in decades) initially presented after a fall due to LLE weakness, found to have R basal ganglia CVA, NIHSS 3, not a tPA candidate, new onset A.fib and newly diagnosed systolic heart failure.  No AC 2/2 hemorrhagic CVA, for LHC today         Vital Signs  T(C): 36.6 (04-22-22 @ 06:30), Max: 36.7 (04-21-22 @ 17:12)  HR: 62 (04-22-22 @ 06:30) (62 - 79)  BP: 110/77 (04-22-22 @ 06:30) (109/71 - 134/97)  RR: 19 (04-22-22 @ 06:30) (18 - 19)  SpO2: 98% (04-22-22 @ 06:30) (97% - 100%)      PHYSICAL EXAM:  NEURO: Non-focal, AxOx3.  No neuro deficits   CHEST/LUNG: Clear to auscultation bilaterally; No wheeze  HEART: s1 s2 Regular rate and rhythm; No murmurs, rubs, or gallops  ABDOMEN: Soft, Nontender, Nondistended; Bowel sounds present X 4 quadrants   EXTREMITIES:  2+ Peripheral Pulses, No clubbing, cyanosis, or edema   VASCULAR: Peripheral pulses palpable 2+ bilaterally  PROCEDURE SITE: RFA accessed, sheath to be removed in recovery room by RN. Site is without hematoma or bleeding. Sensation and RAHEL intact. Distal pulses palpable 2+, capillary refill < 2 seconds. Patient denies pain, numbness, tingling, CP or SOB. Clean dry dressing applied     PROCEDURE RESULTS: full report to follow     ASSESSMENT:  61 y/o male (haven't seen a physician or had wellness check in decades) initially presented after a fall due to LLE weakness, found to have R basal ganglia CVA, NIHSS 3, not a tPA candidate, new onset A.fib and newly diagnosed systolic heart failure.  No AC 2/2 hemorrhagic CVA, for LHC today       s/p LHC revealing non obstructive disease       PLAN:  -VS, diet, activity as per post cath orders  - IVF per JENNIFER protocol ; excluded 2/2 severely reduced EF  -Encourage PO fluids  -Continue current medications, digoxin, lasix and metoprolol   -Post cath instructions reviewed, patient verbalizes and understands instructions  -Discussed therapeutic lifestyle changes to reduce risk factors such as following a cardiac diet, weight loss, maintaining a healthy weight, exercise, smoking cessation, medication compliance, and regular follow-up  with PCP/Cardioloigst  -Plan of care discussed with patient, RN and Dr. Malone  - rest of care per primary team

## 2022-04-22 NOTE — PROGRESS NOTE ADULT - ASSESSMENT
A/P: 62 yr old M with no PMHX was brought to ED by EMS on 4/11/22 s/p fall for LE weakness.     Atrial fibrillation.   suboptimally rate controlled. HR is   Off AC secondary to hemorrhagic transformation.   Rate control with Dig level   CHF. Severely reduced LVEF of 15%- non-obstructive CAD  A Fib rate control  Will reintroduce ACE  EP consult  Evaluation for VEST

## 2022-04-22 NOTE — CHART NOTE - NSCHARTNOTESELECT_GEN_ALL_CORE
Event Note
Event Note
Medicine to ICU transfer/Transfer Note
Neurosurgery follow up/Event Note
Pre procedure

## 2022-04-22 NOTE — PACU DISCHARGE NOTE - COMMENTS
[facility] postponed until tomorrow morning. Report given to MARGARITA Miranda on 3N and confirmed with Margaret tele tech that patient is being remotely monitored. Patient placed on Zoll monitor and pending transport to 3N at this time
Patient s/p LHC via RFA, Right groin site benign. No s/s of bleeding or hematoma. RLE warm and mobile. VS Stable. Patient denies pain at this itme. Report given to MARGARITA Morton  on 3N and patient confirmed to be remotely telemetry monitored at this time. Patient pending transport to  at this time

## 2022-04-22 NOTE — PROGRESS NOTE ADULT - SUBJECTIVE AND OBJECTIVE BOX
Patient is a 63 y/o/m w/ PMHx, admitted in the ED 4/11/22 s/p fall for LE weakness. Patient states at 5:30 am this morning he got up to turn off the TV when his leg gave way and he fell. Pt's sister lives downstairs and heard the fall; she went upstairs and helped him up to a chair, but then pt got up and fell again. Pt's sister was concerned he was having a stroke so she called EMS. s/p code stroke in the ED.      In ED the patient was evaluated, and code stroke was called. CTH showed no acute infarct or hemorrhage. CTA head and neck showed no LVO or stenosis or aneurysms. CT perfusion showed no core infarct. IV TPA was not given because LKN was last night, NIHSS 4-5. Pt was given 2 baby ASA. He was found to have new onset Afib in ED, and upon my evaluation his BP was elevated at 156/118.    Medical progress: Care discussed with neurosurgery - ok to restart blood thinners (CT scan prior to restarting Ac /  neurochecks q4h). Care discussed with Dr. Malone - s/p cardiac cath - non-obstructive coronary artery disease.   Complaints: coughing  State of mind:  normal /  slow      REVIEW OF SYSTEMS:  Poor historian    Physical Exam:   GENERAL APPEARANCE: + weakness  ICU Vital Signs Last 24 Hrs  T(C): 36.3 (22 Apr 2022 11:42), Max: 36.7 (21 Apr 2022 17:12)  T(F): 97.3 (22 Apr 2022 11:42), Max: 98 (21 Apr 2022 17:12)  HR: 83 (22 Apr 2022 11:42) (58 - 83)  BP: 128/66 (22 Apr 2022 11:42) (94/55 - 134/97)  BP(mean): 88 (22 Apr 2022 06:30) (88 - 88)  RR: 18 (22 Apr 2022 11:42) (16 - 19)  SpO2: 100% (22 Apr 2022 11:42) (97% - 100%)  HEENT:  Head is normocephalic    Skin:  Warm and dry without any rash   NECK:  Supple without lymphadenopathy.   HEART:  Regular rate and rhythm. normal S1 and S2   LUNGS:  Good ins/exp effort, no W/R/R/C  ABDOMEN:  Soft, nontender, nondistended with good bowel sounds heard  EXTREMITIES:  Without cyanosis, clubbing or edema.   NEUROLOGICAL: dysarthria   slight flattening of left nasolabial fold, mild dysarthria, tongue protrudes in the midline. coordination: +dysmetria on left finger to nose    Labs:     CBC Full  -  ( 22 Apr 2022 09:00 )  WBC Count : 11.96 K/uL  RBC Count : 5.44 M/uL  Hemoglobin : 16.1 g/dL  Hematocrit : 48.1 %  Platelet Count - Automated : 259 K/uL  Mean Cell Volume : 88.4 fl  Mean Cell Hemoglobin : 29.6 pg  Mean Cell Hemoglobin Concentration : 33.5 gm/dL  Auto Neutrophil # : x  Auto Lymphocyte # : x  Auto Monocyte # : x  Auto Eosinophil # : x  Auto Basophil # : x  Auto Neutrophil % : x  Auto Lymphocyte % : x  Auto Monocyte % : x  Auto Eosinophil % : x  Auto Basophil % : x        04-21    138  |  104  |  28<H>  ----------------------------<  125<H>  4.0   |  28  |  1.04    Ca    9.4      21 Apr 2022 08:17    TPro  7.0  /  Alb  3.0<L>  /  TBili  1.7<H>  /  DBili  x   /  AST  23  /  ALT  34  /  AlkPhos  99  04-21          # Acute right CVA, with hemorrhagic transformation. ? Embolic d/t new onset Afib  - start AC today (CT scan of the head / neuro - checks q4h)  - care discussed with neurosurgery /  cardiology  - BPs at goal  - risk and benefits discussed with patient and patient's sister Nancy and the patient.   - I want to discuss care with Dr. Edwards in regards of starting anticoagulation and when that can be started    # New onset a-fib (patient poor rhythm control with good HR control)  - start AC today (CT scan of the head / neuro - checks q4h)  - continues to be  atrial fibrillation -  night goes to 40s, frequent PVCs, HR 60-80s.   - d/c dig as patient's dig level is elevated  - EF on echo - 15%.  - eventual ischemia workup  - Cardiology is following.    # CHF. Severely reduced LVEF of 15%- ischemic vs reversible cardiomyopathy . Will hold off on ACEI for now and will increase betablocker to better rate control  - ischemia evaluation in the am  - eventually need a cath or perfusion study with viability    - patient's cardiomyopathy secondary to   - Will eventually need a cath or perfusion study with viability when clinical status improves     # Leukocytosis -  patient might be microaspiration   - 4/20 patient re-evaluation asked to speech and swallow  - patient does not appears to have any fevers, chills or shakes  - no signs of infection /  if any fevers, chills or shakes -  will initiate infectious workup    # (+) Troponin Likely demand in setting of CVA, afib   - Medical mgmt as above.   - Only one set positive and later sets negative.        Patient is a 61 y/o/m w/ PMHx, admitted in the ED 4/11/22 s/p fall for LE weakness. Patient states at 5:30 am this morning he got up to turn off the TV when his leg gave way and he fell. Pt's sister lives downstairs and heard the fall; she went upstairs and helped him up to a chair, but then pt got up and fell again. Pt's sister was concerned he was having a stroke so she called EMS. s/p code stroke in the ED.      In ED the patient was evaluated, and code stroke was called. CTH showed no acute infarct or hemorrhage. CTA head and neck showed no LVO or stenosis or aneurysms. CT perfusion showed no core infarct. IV TPA was not given because LKN was last night, NIHSS 4-5. Pt was given 2 baby ASA. He was found to have new onset Afib in ED, and upon my evaluation his BP was elevated at 156/118.    Medical progress: Care discussed with neurosurgery - ok to restart blood thinners (CT scan prior to restarting Ac /  neurochecks q4h). Care discussed with Dr. Malone - s/p cardiac cath - non-obstructive coronary artery disease.   Complaints: coughing  State of mind:  normal /  slow  plan to start AC in the am. i would like to monitor patient through-out the day myself.     REVIEW OF SYSTEMS:  Poor historian    Physical Exam:   GENERAL APPEARANCE: + weakness  ICU Vital Signs Last 24 Hrs  T(C): 36.3 (22 Apr 2022 11:42), Max: 36.7 (21 Apr 2022 17:12)  T(F): 97.3 (22 Apr 2022 11:42), Max: 98 (21 Apr 2022 17:12)  HR: 83 (22 Apr 2022 11:42) (58 - 83)  BP: 128/66 (22 Apr 2022 11:42) (94/55 - 134/97)  BP(mean): 88 (22 Apr 2022 06:30) (88 - 88)  RR: 18 (22 Apr 2022 11:42) (16 - 19)  SpO2: 100% (22 Apr 2022 11:42) (97% - 100%)  HEENT:  Head is normocephalic    Skin:  Warm and dry without any rash   NECK:  Supple without lymphadenopathy.   HEART:  Regular rate and rhythm. normal S1 and S2   LUNGS:  Good ins/exp effort, no W/R/R/C  ABDOMEN:  Soft, nontender, nondistended with good bowel sounds heard  EXTREMITIES:  Without cyanosis, clubbing or edema.   NEUROLOGICAL: dysarthria   slight flattening of left nasolabial fold, mild dysarthria, tongue protrudes in the midline. coordination: +dysmetria on left finger to nose    Labs:     CBC Full  -  ( 22 Apr 2022 09:00 )  WBC Count : 11.96 K/uL  RBC Count : 5.44 M/uL  Hemoglobin : 16.1 g/dL  Hematocrit : 48.1 %  Platelet Count - Automated : 259 K/uL  Mean Cell Volume : 88.4 fl  Mean Cell Hemoglobin : 29.6 pg  Mean Cell Hemoglobin Concentration : 33.5 gm/dL  Auto Neutrophil # : x  Auto Lymphocyte # : x  Auto Monocyte # : x  Auto Eosinophil # : x  Auto Basophil # : x  Auto Neutrophil % : x  Auto Lymphocyte % : x  Auto Monocyte % : x  Auto Eosinophil % : x  Auto Basophil % : x        04-21    138  |  104  |  28<H>  ----------------------------<  125<H>  4.0   |  28  |  1.04    Ca    9.4      21 Apr 2022 08:17    TPro  7.0  /  Alb  3.0<L>  /  TBili  1.7<H>  /  DBili  x   /  AST  23  /  ALT  34  /  AlkPhos  99  04-21          # Acute right CVA, with hemorrhagic transformation. ? Embolic d/t new onset Afib  - start AC today (CT scan of the head / neuro - checks q4h)  - care discussed with neurosurgery /  cardiology  - BPs at goal  - risk and benefits discussed with patient and patient's sister Nancy and the patient.   - I want to discuss care with Dr. Edwards in regards of starting anticoagulation and when that can be started    # New onset a-fib (patient poor rhythm control with good HR control)  - start AC today (CT scan of the head / neuro - checks q4h)  - continues to be  atrial fibrillation -  night goes to 40s, frequent PVCs, HR 60-80s.   - d/c dig as patient's dig level is elevated  - EF on echo - 15%.  - eventual ischemia workup  - Cardiology is following.    # CHF. Severely reduced LVEF of 15%- ischemic vs reversible cardiomyopathy . Will hold off on ACEI for now and will increase betablocker to better rate control  - ischemia evaluation in the am  - eventually need a cath or perfusion study with viability    - patient's cardiomyopathy secondary to   - Will eventually need a cath or perfusion study with viability when clinical status improves     # Leukocytosis -  patient might be microaspiration   - 4/20 patient re-evaluation asked to speech and swallow  - patient does not appears to have any fevers, chills or shakes  - no signs of infection /  if any fevers, chills or shakes -  will initiate infectious workup    # (+) Troponin Likely demand in setting of CVA, afib   - Medical mgmt as above.   - Only one set positive and later sets negative.

## 2022-04-22 NOTE — PROGRESS NOTE ADULT - ASSESSMENT
63 yo male with no PMHX was brought to ED by EMS on 4/11/22 s/p fall for LE weakness, admitted for CVA complicated by hemorrhagic conversion and new onset afib, and echo with new severely reduced LVEF    New onset Atrial fibrillation, HFrEF  Patient remains in Afib and  cannot receive OAC due to hemorrhagic transformation of CVA, therefore cannot pursue rhythm control strategy for afib  Afib is currently rate controlled, continue Metoprolol and Digoxin  LHC revealed non obstructive CAD  Lifevest not indicated in setting of non ischemic cardiomyopathy  No ventricular ectopy noted on tele - in setting of non-ischemic etiology patient will need 3months of optimal GDMT  and re-evaluation of LVEF   Outpatient EP follow up  Further management per medicine and neuro  Plan discussed with patient and Dr. Roy

## 2022-04-23 PROCEDURE — 99221 1ST HOSP IP/OBS SF/LOW 40: CPT

## 2022-04-23 PROCEDURE — 99233 SBSQ HOSP IP/OBS HIGH 50: CPT

## 2022-04-23 RX ORDER — APIXABAN 2.5 MG/1
5 TABLET, FILM COATED ORAL EVERY 12 HOURS
Refills: 0 | Status: DISCONTINUED | OUTPATIENT
Start: 2022-04-23 | End: 2022-04-28

## 2022-04-23 RX ORDER — SPIRONOLACTONE 25 MG/1
25 TABLET, FILM COATED ORAL
Refills: 0 | Status: DISCONTINUED | OUTPATIENT
Start: 2022-04-23 | End: 2022-04-28

## 2022-04-23 RX ORDER — APIXABAN 2.5 MG/1
5 TABLET, FILM COATED ORAL EVERY 12 HOURS
Refills: 0 | Status: DISCONTINUED | OUTPATIENT
Start: 2022-04-23 | End: 2022-04-23

## 2022-04-23 RX ORDER — METOPROLOL TARTRATE 50 MG
50 TABLET ORAL
Refills: 0 | Status: DISCONTINUED | OUTPATIENT
Start: 2022-04-23 | End: 2022-04-28

## 2022-04-23 RX ADMIN — APIXABAN 5 MILLIGRAM(S): 2.5 TABLET, FILM COATED ORAL at 08:36

## 2022-04-23 RX ADMIN — Medication 50 MILLIGRAM(S): at 21:43

## 2022-04-23 RX ADMIN — Medication 125 MICROGRAM(S): at 09:13

## 2022-04-23 RX ADMIN — APIXABAN 5 MILLIGRAM(S): 2.5 TABLET, FILM COATED ORAL at 21:40

## 2022-04-23 RX ADMIN — Medication 50 MILLIGRAM(S): at 09:13

## 2022-04-23 RX ADMIN — ATORVASTATIN CALCIUM 80 MILLIGRAM(S): 80 TABLET, FILM COATED ORAL at 21:40

## 2022-04-23 RX ADMIN — SPIRONOLACTONE 25 MILLIGRAM(S): 25 TABLET, FILM COATED ORAL at 15:44

## 2022-04-23 RX ADMIN — LISINOPRIL 2.5 MILLIGRAM(S): 2.5 TABLET ORAL at 09:13

## 2022-04-23 NOTE — BH CONSULTATION LIAISON ASSESSMENT NOTE - SUMMARY
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    Patient was sitting in a chair, minimally conversant, alert, oriented to time and place, endorses that he has weakness on the left side more so on the left leg than the hand. As per CT head he has a stroke on the Rt. Basal ganglia and thus is sad , but also grateful that he is able to drive to work as the Right side is intact. He works for security for many years. He is unaware of any high BP or other medical issues, has good sleep/appetite. Denied any drug abuse hx, denies any alcohol or other psycho-stimulant abuse. No prior Psychiatric issues before the incident  and there are no family hx of Depression as well. He is unwilling to take any anti-depressants at this time.    Plan: To continue medical treatment as suggested           No psychiatric Intervention needed

## 2022-04-23 NOTE — PROGRESS NOTE ADULT - SUBJECTIVE AND OBJECTIVE BOX
Patient is a 62y old  Male who presents with a chief complaint of Left sided weakness .       HPI:  62 yr old M with no PMHX was brought to ED by EMS on 22 s/p fall for LE weakness. Patient states at 5:30 am this morning he got up to turn off the TV when his leg gave way and he fell. Pt's sister lives downstairs and heard the fall; she went upstairs and helped him up to a chair, but then pt got up and fell again. Pt's sister was concerned he was having a stroke so she called EMS.    Pt seen this am.  No symptoms or overnight issues.    PAST MEDICAL & SURGICAL HISTORY: None  FAMILY HISTORY: mother  at 87 from dementia; Father  at abdelrahman age of 87 from AFIB  Social Hx:  Nonsmoker, no drug or alcohol use      PAST MEDICAL & SURGICAL HISTORY:      MEDICATIONS  (STANDING):  atorvastatin 80 milliGRAM(s) Oral at bedtime  chlorhexidine 4% Liquid 1 Application(s) Topical <User Schedule>  digoxin     Tablet 125 MICROGram(s) Oral daily  lisinopril 2.5 milliGRAM(s) Oral daily  metoprolol succinate ER 50 milliGRAM(s) Oral two times a day  spironolactone 25 milliGRAM(s) Oral daily    MEDICATIONS  (PRN):  acetaminophen     Tablet .. 650 milliGRAM(s) Oral every 6 hours PRN Mild Pain (1 - 3), Moderate Pain (4 - 6)      FAMILY HISTORY:      SOCIAL HISTORY: no recent smoking     REVIEW OF SYSTEMS:  CONSTITUTIONAL:    No fatigue, malaise, lethargy.  No fever or chills.  RESPIRATORY:  No cough.  No wheeze.  No hemoptysis.  No shortness of breath.  CARDIOVASCULAR:  No chest pains.  No palpitations. No shortness of breath, No orthopnea or PND.  GASTROINTESTINAL:  No abdominal pain.  No nausea or vomiting.    GENITOURINARY:    No hematuria.    MUSCULOSKELETAL:  No musculoskeletal pain.  No joint swelling.  No arthritis.  PSYCHIATRIC:  No confusion  SKIN:  No rashes.            Vital Signs Last 24 Hrs  T(C): 36.4 (2022 21:32), Max: 36.7 (2022 10:12)  T(F): 97.5 (2022 21:32), Max: 98 (2022 10:12)  HR: 110 (2022 21:32) (58 - 110)  BP: 98/51 (2022 21:32) (88/57 - 129/73)  BP(mean): --  RR: 18 (2022 21:32) (16 - 18)  SpO2: 100% (2022 21:32) (100% - 100%)    PHYSICAL EXAM-    Constitutional:cachectic male in no acute distress   Head: Head is normocephalic and atraumatic.      Neck: No jugular venous distention. No audible carotid bruits. There are strong carotid pulses bilaterally. No JVD.     Cardiovascular: irregular rate and rhythm without S3, S4. No murmurs or rubs are appreciated.      Respiratory: Breathsounds are normal. No rales. No wheezing.    Abdomen: Soft, nontender, nondistended with positive bowel sounds.      Extremity: No tenderness. No  pitting edema     Neurologic: The patient is alert and oriented.      Skin: No rash, no obvious lesions noted.      Psychiatric: The patient appears to be emotionally stable.      INTERPRETATION OF TELEMETRY: afib 80/min     ECG: afib     I&O's Detail      LABS:                        16.1   11.96 )-----------( 259      ( 2022 09:00 )             48.1     -    138  |  104  |  28<H>  ----------------------------<  125<H>  4.0   |  28  |  1.04    Ca    9.4      2022 08:17    TPro  7.0  /  Alb  3.0<L>  /  TBili  1.7<H>  /  DBili  x   /  AST  23  /  ALT  34  /  AlkPhos  99              I&O's Summary    BNP  RADIOLOGY & ADDITIONAL STUDIES:  < from: TTE Echo Complete w/o Contrast w/ Doppler (22 @ 09:02) >     Impression     Summary     Moderate (2+) mitral regurgitation is present.   Normal aortic valve structure and function.   Moderate (2+) tricuspid valve regurgitation is present.   The left ventricle cavity is mildly dilated.   Estimated left ventricular ejection fraction is 15 %.   Severe, diffuse hypokinesis of the left ventricle is present.   The right ventricle exhibits mild diffuse hypokinesis, and mild   depression   of contractility.   Pleural effusion - is present.     Signature     ----------------------------------------------------------------   Electronically signed by [facility]   physician) on 2022 10:55 AM   ----------------------------------------------------------------    < end of copied text >

## 2022-04-23 NOTE — PROGRESS NOTE ADULT - ASSESSMENT
Nonischemic cardiomyopathy - LVEF 15%, euvolemic-  Will switch metoprolol to toprol   will start spirnolactone at lunch time to space out.  Will continue lisinopril.  BP borderline low for further uptitration.  EP team not planning on life vest.    Afib- rate controlled  Unable to rate control secondary to hemorrhagic transformation.  cont digoxin and toprol.    HTN- meds as above.    Hyperlipidemia- continue statin.    Other medical issues- Management per primary team.   Thank you for allowing me to participate in the care of this patient. Please feel free to contact me with any questions.

## 2022-04-23 NOTE — BH CONSULTATION LIAISON ASSESSMENT NOTE - HPI (INCLUDE ILLNESS QUALITY, SEVERITY, DURATION, TIMING, CONTEXT, MODIFYING FACTORS, ASSOCIATED SIGNS AND SYMPTOMS)
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    Patient was sitting in a chair, minimally conversant, alert, oriented to time and place, endorses that he has weakness on the left side more so on the left leg than the hand. As per CT head he has a stroke on the Rt. Basal ganglia and thus is sad , but also grateful that he is able to drive to work as the Right side is intact. He works for security for many years. He is unaware of any high BP or other medical issues, has good sleep/appetite. Denied any drug abuse hx, denies any alcohol or other psycho-stimulant abuse. No prior Psychiatric issues before the incident  and there are no family hx of Depression as well. He is unwilling to take any anti-depressants at this time.

## 2022-04-23 NOTE — PROGRESS NOTE ADULT - SUBJECTIVE AND OBJECTIVE BOX
Patient is a 61 y/o/m w/ PMHx, admitted in the ED 4/11/22 s/p fall for LE weakness. Patient states at 5:30 am this morning he got up to turn off the TV when his leg gave way and he fell. Pt's sister lives downstairs and heard the fall; she went upstairs and helped him up to a chair, but then pt got up and fell again. Pt's sister was concerned he was having a stroke so she called EMS. s/p code stroke in the ED.      In ED the patient was evaluated, and code stroke was called. CTH showed no acute infarct or hemorrhage. CTA head and neck showed no LVO or stenosis or aneurysms. CT perfusion showed no core infarct. IV TPA was not given because LKN was last night, NIHSS 4-5. Pt was given 2 baby ASA. He was found to have new onset Afib in ED, and upon my evaluation his BP was elevated at 156/118.    Medical progress: Patient continues to be deconditioned frail. Minimal activity. Withdrawn from conversation. Sister updated daily.   Complaints: feels tired  State of mind:  normal /  slow  Care discussed with Dr. Malone /  Neurosurgery team and Neurology. As patient remains in the hospital due to pending insurance /  bed in the rehab -  patient will be started on anticoagulation. Prior to anticoagulation CT scan of the head has been obtained and neurological exam was done. As this is a day time -  I will be frequently re-evaluation patient for any neurological changes. Anticipated d/c on monday in rehab.     REVIEW OF SYSTEMS:  Poor historian    Physical Exam:   GENERAL APPEARANCE: + weakness, deconditioned state.   T(C): 36.4 (22 Apr 2022 21:32), Max: 36.7 (22 Apr 2022 10:12)  T(F): 97.5 (22 Apr 2022 21:32), Max: 98 (22 Apr 2022 10:12)  HR: 110 (22 Apr 2022 21:32) (58 - 110)  BP: 98/51 (22 Apr 2022 21:32) (88/57 - 128/66)  RR: 18 (22 Apr 2022 21:32) (16 - 18)  SpO2: 100% (22 Apr 2022 21:32) (100% - 100%)  HEENT:  (L) upper orbital laceration from fall in the hospital requiring sutures by surgical team  Skin:  Warm and dry without any rash   NECK:  Supple without lymphadenopathy.   HEART:  Regular rate and rhythm. normal S1 and S2   LUNGS:  Good ins/exp effort, no W/R/R/C  ABDOMEN:  Soft, nontender, nondistended with good bowel sounds heard  EXTREMITIES:  Without cyanosis, clubbing or edema.   NEUROLOGICAL: dysarthria   slight flattening of left nasolabial fold, mild dysarthria, tongue protrudes in the midline. coordination: +dysmetria on left finger to nose    Labs:   CBC Full  -  ( 22 Apr 2022 09:00 )  WBC Count : 11.96 K/uL  RBC Count : 5.44 M/uL  Hemoglobin : 16.1 g/dL  Hematocrit : 48.1 %  Platelet Count - Automated : 259 K/uL  Mean Cell Volume : 88.4 fl  Mean Cell Hemoglobin : 29.6 pg  Mean Cell Hemoglobin Concentration : 33.5 gm/dL  Auto Neutrophil # : x  Auto Lymphocyte # : x  Auto Monocyte # : x  Auto Eosinophil # : x  Auto Basophil # : x  Auto Neutrophil % : x  Auto Lymphocyte % : x  Auto Monocyte % : x  Auto Eosinophil % : x  Auto Basophil % : x        # Acute right CVA, with hemorrhagic transformation suspected embolic d/t new onset Afib  - tele monitoring  - MRI: Acute infarction within the anterior RIGHT basal ganglia and straightening restricted diffusion and central hemorrhage measuring 2.4 cm, with mass effect on the anterior horn of the RIGHT lateral   ventricle. Smaller acute infarctions involve the anterior inferior RIGHT frontal lobe, the posterior RIGHT basal ganglia and posterior RIGHT insular cortex as well as the posterior RIGHT temporal on lateral RIGHT   occipital cortex,  with restricted diffusion.  - start AC today (CT scan of the head / neuro - checks q4h) 4/23 am dose  - BPs at goal  - risk and benefits discussed with patient and patient's sister Nancy and the patient. If any neurological changes - plan  is to D/C AC and repeat CT scan.   - care discussed with neurosurgical team    # New onset a-fib (patient poor rhythm control with good HR control)  - start AC today (CT scan of the head / neuro - checks q4h) 4/23 am dose  - tele monitoring: currently patient's rhythm a. fib, [id number], when patient goes to bathroom 190s  - Toprol XL 50 mg po q12h  - dig 125 mg po qdaily  - EF on echo - 15%.  - Cardiology is following.    # Acute on chronic HEFrEF   # Severely reduced LVEF of 15% suspected secondary to tachyarrhythmia   # (+) Troponin Likely demand in setting of CVA, afib   - patient continues to have a at times poor rate control  - Torpol Xl 50 mg po q12h  - Spirinolactone 25 mg po qdaily  - lisinopril 2.5 mg po qdaily  - s/p LHC revealing non obstructive disease  4/22    # Leukocytosis -  patient might be microaspiration   - resolving

## 2022-04-23 NOTE — BH CONSULTATION LIAISON ASSESSMENT NOTE - NSBHCHARTREVIEWVS_PSY_A_CORE FT
Vital Signs Last 24 Hrs  T(C): 36.3 (23 Apr 2022 08:26), Max: 36.5 (22 Apr 2022 16:20)  T(F): 97.3 (23 Apr 2022 08:26), Max: 97.7 (22 Apr 2022 16:20)  HR: 71 (23 Apr 2022 08:26) (71 - 110)  BP: 110/66 (23 Apr 2022 08:26) (88/57 - 116/74)  BP(mean): --  RR: 18 (23 Apr 2022 08:26) (18 - 18)  SpO2: 100% (23 Apr 2022 08:26) (100% - 100%)

## 2022-04-23 NOTE — BH CONSULTATION LIAISON ASSESSMENT NOTE - CURRENT MEDICATION
MEDICATIONS  (STANDING):  apixaban 5 milliGRAM(s) Oral every 12 hours  atorvastatin 80 milliGRAM(s) Oral at bedtime  chlorhexidine 4% Liquid 1 Application(s) Topical <User Schedule>  digoxin     Tablet 125 MICROGram(s) Oral daily  lisinopril 2.5 milliGRAM(s) Oral daily  metoprolol succinate ER 50 milliGRAM(s) Oral two times a day  spironolactone 25 milliGRAM(s) Oral <User Schedule>    MEDICATIONS  (PRN):  acetaminophen     Tablet .. 650 milliGRAM(s) Oral every 6 hours PRN Mild Pain (1 - 3), Moderate Pain (4 - 6)

## 2022-04-24 LAB
ALBUMIN SERPL ELPH-MCNC: 2.8 G/DL — LOW (ref 3.3–5)
ALP SERPL-CCNC: 108 U/L — SIGNIFICANT CHANGE UP (ref 40–120)
ALT FLD-CCNC: 34 U/L — SIGNIFICANT CHANGE UP (ref 12–78)
ANION GAP SERPL CALC-SCNC: 9 MMOL/L — SIGNIFICANT CHANGE UP (ref 5–17)
AST SERPL-CCNC: 24 U/L — SIGNIFICANT CHANGE UP (ref 15–37)
BILIRUB SERPL-MCNC: 0.8 MG/DL — SIGNIFICANT CHANGE UP (ref 0.2–1.2)
BUN SERPL-MCNC: 29 MG/DL — HIGH (ref 7–23)
CALCIUM SERPL-MCNC: 9.3 MG/DL — SIGNIFICANT CHANGE UP (ref 8.5–10.1)
CHLORIDE SERPL-SCNC: 105 MMOL/L — SIGNIFICANT CHANGE UP (ref 96–108)
CO2 SERPL-SCNC: 24 MMOL/L — SIGNIFICANT CHANGE UP (ref 22–31)
CREAT SERPL-MCNC: 1.05 MG/DL — SIGNIFICANT CHANGE UP (ref 0.5–1.3)
CULTURE RESULTS: SIGNIFICANT CHANGE UP
EGFR: 79 ML/MIN/1.73M2 — SIGNIFICANT CHANGE UP
GLUCOSE SERPL-MCNC: 210 MG/DL — HIGH (ref 70–99)
HCT VFR BLD CALC: 42.1 % — SIGNIFICANT CHANGE UP (ref 39–50)
HGB BLD-MCNC: 14.6 G/DL — SIGNIFICANT CHANGE UP (ref 13–17)
MCHC RBC-ENTMCNC: 29.9 PG — SIGNIFICANT CHANGE UP (ref 27–34)
MCHC RBC-ENTMCNC: 34.7 GM/DL — SIGNIFICANT CHANGE UP (ref 32–36)
MCV RBC AUTO: 86.1 FL — SIGNIFICANT CHANGE UP (ref 80–100)
PLATELET # BLD AUTO: 241 K/UL — SIGNIFICANT CHANGE UP (ref 150–400)
POTASSIUM SERPL-MCNC: 4.1 MMOL/L — SIGNIFICANT CHANGE UP (ref 3.5–5.3)
POTASSIUM SERPL-SCNC: 4.1 MMOL/L — SIGNIFICANT CHANGE UP (ref 3.5–5.3)
PROT SERPL-MCNC: 6.7 GM/DL — SIGNIFICANT CHANGE UP (ref 6–8.3)
RBC # BLD: 4.89 M/UL — SIGNIFICANT CHANGE UP (ref 4.2–5.8)
RBC # FLD: 12.7 % — SIGNIFICANT CHANGE UP (ref 10.3–14.5)
SARS-COV-2 RNA SPEC QL NAA+PROBE: SIGNIFICANT CHANGE UP
SODIUM SERPL-SCNC: 138 MMOL/L — SIGNIFICANT CHANGE UP (ref 135–145)
SPECIMEN SOURCE: SIGNIFICANT CHANGE UP
WBC # BLD: 10.47 K/UL — SIGNIFICANT CHANGE UP (ref 3.8–10.5)
WBC # FLD AUTO: 10.47 K/UL — SIGNIFICANT CHANGE UP (ref 3.8–10.5)

## 2022-04-24 PROCEDURE — 99233 SBSQ HOSP IP/OBS HIGH 50: CPT

## 2022-04-24 RX ORDER — LISINOPRIL 2.5 MG/1
5 TABLET ORAL DAILY
Refills: 0 | Status: DISCONTINUED | OUTPATIENT
Start: 2022-04-24 | End: 2022-04-25

## 2022-04-24 RX ADMIN — LISINOPRIL 5 MILLIGRAM(S): 2.5 TABLET ORAL at 14:45

## 2022-04-24 RX ADMIN — Medication 125 MICROGRAM(S): at 14:45

## 2022-04-24 RX ADMIN — CHLORHEXIDINE GLUCONATE 1 APPLICATION(S): 213 SOLUTION TOPICAL at 11:37

## 2022-04-24 RX ADMIN — ATORVASTATIN CALCIUM 80 MILLIGRAM(S): 80 TABLET, FILM COATED ORAL at 21:06

## 2022-04-24 RX ADMIN — SPIRONOLACTONE 25 MILLIGRAM(S): 25 TABLET, FILM COATED ORAL at 18:59

## 2022-04-24 RX ADMIN — Medication 50 MILLIGRAM(S): at 11:38

## 2022-04-24 RX ADMIN — APIXABAN 5 MILLIGRAM(S): 2.5 TABLET, FILM COATED ORAL at 21:06

## 2022-04-24 RX ADMIN — APIXABAN 5 MILLIGRAM(S): 2.5 TABLET, FILM COATED ORAL at 11:37

## 2022-04-24 RX ADMIN — Medication 50 MILLIGRAM(S): at 21:08

## 2022-04-24 NOTE — PROGRESS NOTE ADULT - SUBJECTIVE AND OBJECTIVE BOX
Patient is a 62y old  Male who presents with a chief complaint of Left sided weakness .       HPI:  62 yr old M with no PMHX was brought to ED by EMS on 22 s/p fall for LE weakness. Patient states at 5:30 am this morning he got up to turn off the TV when his leg gave way and he fell. Pt's sister lives downstairs and heard the fall; she went upstairs and helped him up to a chair, but then pt got up and fell again. Pt's sister was concerned he was having a stroke so she called EMS.  -   Pt seen this am.  No symptoms or overnight issues.  - pt denies any symptoms overnight.     PAST MEDICAL & SURGICAL HISTORY: None  FAMILY HISTORY: mother  at 87 from dementia; Father  at abdelrahman age of 87 from AFIB  Social Hx:  Nonsmoker, no drug or alcohol use      PAST MEDICAL & SURGICAL HISTORY:      MEDICATIONS  (STANDING):  atorvastatin 80 milliGRAM(s) Oral at bedtime  chlorhexidine 4% Liquid 1 Application(s) Topical <User Schedule>  digoxin     Tablet 125 MICROGram(s) Oral daily  lisinopril 2.5 milliGRAM(s) Oral daily  metoprolol succinate ER 50 milliGRAM(s) Oral two times a day  spironolactone 25 milliGRAM(s) Oral daily    MEDICATIONS  (PRN):  acetaminophen     Tablet .. 650 milliGRAM(s) Oral every 6 hours PRN Mild Pain (1 - 3), Moderate Pain (4 - 6)      FAMILY HISTORY:      SOCIAL HISTORY: no recent smoking     REVIEW OF SYSTEMS:  CONSTITUTIONAL:    No fatigue, malaise, lethargy.  No fever or chills.  RESPIRATORY:  No cough.  No wheeze.  No hemoptysis.  No shortness of breath.  CARDIOVASCULAR:  No chest pains.  No palpitations. No shortness of breath, No orthopnea or PND.  GASTROINTESTINAL:  No abdominal pain.  No nausea or vomiting.    GENITOURINARY:    No hematuria.    MUSCULOSKELETAL:  No musculoskeletal pain.  No joint swelling.  No arthritis.  PSYCHIATRIC:  No confusion  SKIN:  No rashes.        ICU Vital Signs Last 24 Hrs  T(C): 36.8 (2022 20:09), Max: 36.8 (2022 20:09)  T(F): 98.2 (2022 20:09), Max: 98.2 (2022 20:09)  HR: 56 (2022 20:09) (56 - 80)  BP: 109/65 (2022 20:09) (108/71 - 110/66)  BP(mean): --  ABP: --  ABP(mean): --  RR: 18 (2022 20:09) (18 - 18)  SpO2: 100% (2022 20:09) (100% - 100%)      PHYSICAL EXAM-    Constitutional:cachectic male in no acute distress   Head: Head is normocephalic and atraumatic.      Neck: No jugular venous distention. No audible carotid bruits. There are strong carotid pulses bilaterally. No JVD.     Cardiovascular: irregular rate and rhythm without S3, S4. No murmurs or rubs are appreciated.      Respiratory: Breathsounds are normal. No rales. No wheezing.    Abdomen: Soft, nontender, nondistended with positive bowel sounds.      Extremity: No tenderness. No  pitting edema     Neurologic: The patient is alert and oriented.      Skin: No rash, no obvious lesions noted.      Psychiatric: The patient appears to be emotionally stable.      INTERPRETATION OF TELEMETRY: afib 80/min     ECG: afib     I&O's Detail      LABS:                        16.1   11.96 )-----------( 259      ( 2022 09:00 )             48.1                   04-12 Chol 149 LDL -- HDL 43 Trig 81        I&O's Summary    BNP  RADIOLOGY & ADDITIONAL STUDIES:  < from: TTE Echo Complete w/o Contrast w/ Doppler (22 @ 09:02) >     Impression     Summary     Moderate (2+) mitral regurgitation is present.   Normal aortic valve structure and function.   Moderate (2+) tricuspid valve regurgitation is present.   The left ventricle cavity is mildly dilated.   Estimated left ventricular ejection fraction is 15 %.   Severe, diffuse hypokinesis of the left ventricle is present.   The right ventricle exhibits mild diffuse hypokinesis, and mild   depression   of contractility.   Pleural effusion - is present.     Signature     ----------------------------------------------------------------   Electronically signed by Anders Marcelino MD(Interpreting   physician) on 2022 10:55 AM   ----------------------------------------------------------------    < end of copied text >

## 2022-04-24 NOTE — PROGRESS NOTE ADULT - SUBJECTIVE AND OBJECTIVE BOX
CC: Left sided weakness (24 Apr 2022 06:25)    HPI:  62 yr old M with no PMHX was brought to ED by EMS on 4/11/22 s/p fall for LE weakness. Patient states at 5:30 am this morning he got up to turn off the TV when his leg gave way and he fell. Pt's sister lives downstairs and heard the fall; she went upstairs and helped him up to a chair, but then pt got up and fell again. Pt's sister was concerned he was having a stroke so she called EMS.     In ED the patient was evaluated, and code stroke was called. CTH showed no acute infarct or hemorrhage. CTA head and neck showed no LVO or stenosis or aneurysms. CT perfusion showed no core infarct. IV TPA was not given because LKN was last night, NIHSS 3. Pt was given 2 baby ASA. He was found to have new onset Afib in ED, and upon my evaluation BP was elevated at 156/118.      INTERVAL HPI/ OVERNIGHT EVENTS:  chart reviewed, Pt was seen and examined, reports feeling tired not sleeping well in the hospital. States that now hyst L leg feels weak, otherwise is better. Agrees with d/c to rehab when ready     Vital Signs Last 24 Hrs  T(C): 36.5 (24 Apr 2022 16:48), Max: 36.9 (24 Apr 2022 09:13)  T(F): 97.7 (24 Apr 2022 16:48), Max: 98.4 (24 Apr 2022 09:13)  HR: 73 (24 Apr 2022 16:48) (56 - 73)  BP: 126/84 (24 Apr 2022 16:48) (103/58 - 126/84)  RR: 18 (24 Apr 2022 16:48) (18 - 18)  SpO2: 97% (24 Apr 2022 16:48) (97% - 100%)    REVIEW OF SYSTEMS:  All other review of systems is negative unless indicated above.      PHYSICAL EXAM:  General: Well developed;  in no acute distress  Eyes:  EOMI; conjunctiva and sclera clear  Head: Normocephalic; L temporal area laceration, repaired sutures D/C/I   ENMT: No nasal discharge; airway clear  Neck: Supple; non tender; no masses  Respiratory: No wheezes, rales or rhonchi  Cardiovascular: Irregular rate and rhythm. S1 and S2 Normal;   Gastrointestinal: Soft non-tender non-distended; Normal bowel sounds  Genitourinary: No  suprapubic  tenderness  Extremities: No  edema  Vascular: Peripheral pulses palpable 2+ bilaterally  Neurological: Alert and oriented x3, speech clear, LLE4/5.   Skin: Warm and dry. No acute rash  Lymph Nodes: No acute cervical adenopathy  Musculoskeletal: Normal muscle tone, without deformities  Psychiatric: Cooperative and appropriate      LABS:                         14.6   10.47 )-----------( 241      ( 24 Apr 2022 10:50 )             42.1     24 Apr 2022 10:50    138    |  105    |  29     ----------------------------<  210    4.1     |  24     |  1.05     Ca    9.3        24 Apr 2022 10:50    TPro  6.7    /  Alb  2.8    /  TBili  0.8    /  DBili  x      /  AST  24     /  ALT  34     /  AlkPhos  108    24 Apr 2022 10:50      LIVER FUNCTIONS - ( 24 Apr 2022 10:50 )  Alb: 2.8 g/dL / Pro: 6.7 gm/dL / ALK PHOS: 108 U/L / ALT: 34 U/L / AST: 24 U/L / GGT: x               MEDICATIONS  (STANDING):  apixaban 5 milliGRAM(s) Oral every 12 hours  atorvastatin 80 milliGRAM(s) Oral at bedtime  chlorhexidine 4% Liquid 1 Application(s) Topical <User Schedule>  digoxin     Tablet 125 MICROGram(s) Oral daily  lisinopril 5 milliGRAM(s) Oral daily  metoprolol succinate ER 50 milliGRAM(s) Oral two times a day  spironolactone 25 milliGRAM(s) Oral <User Schedule>    MEDICATIONS  (PRN):  acetaminophen     Tablet .. 650 milliGRAM(s) Oral every 6 hours PRN Mild Pain (1 - 3), Moderate Pain (4 - 6)      RADIOLOGY & ADDITIONAL TESTS:      ACC: [id number] EXAM:  ECHO TTE WO CON COMP W DOPP                        PROCEDURE DATE:  04/12/2022  >   Summary     Moderate (2+) mitral regurgitation is present.   Normal aortic valve structure and function.   Moderate (2+) tricuspid valve regurgitation is present.   The left ventricle cavity is mildly dilated.   Estimated left ventricular ejection fraction is 15 %.   Severe, diffuse hypokinesis of the left ventricle is present.   The right ventricle exhibits mild diffuse hypokinesis, and mild   depression   of contractility.   Pleural effusion - is present.        ACC: [id number] EXAM:  CT BRAIN                        PROCEDURE DATE:  04/22/2022        FINDINGS:   CT dated 04/17/2022 available for review.  The brain demonstrates evolving hemorrhagic infarction in the RIGHT basal   ganglia with effacement of RIGHT lateral ventricle but no midline shift.   No acute cerebral cortical infarct is seen.    The orbits are unremarkable.  The paranasal sinuses are clear.  The nasal   cavity appears intact.  The nasopharynx is symmetric.  The central skull   base, petrous temporal bones and calvarium remain intact.      IMPRESSION:  evolving hemorrhagic infarction in the RIGHT basal ganglia   with effacement of RIGHT lateral ventricle but no midline shift.

## 2022-04-24 NOTE — PROGRESS NOTE ADULT - ASSESSMENT
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 Smaller acute infarctions involve the anterior inferior RIGHT frontal lobe, the posterior RIGHT basal ganglia and posterior RIGHT insular cortex as well as the posterior RIGHT temporal on lateral RIGHT   occipital cortex,  with restricted diffusion.  - repeat CT head stable, Pt was clered by neuro Sx to start A/c   - C/w neuro checks   - BPs at goal  - will repeat CT head in am   - C/w statin   - C/w PT         # New onset a-fib   - tele monitoring: AFIB, HR overall improved but not amulated   - C/w  Toprol XL 50 mg po q12h  - dig 125 mg po qdaily  - started on Eliquis on 4/23   - Cardiology is following.    # Acute  HEFrEF   # Severely reduced LVEF of 15% suspected secondary to tachyarrhythmia   # (+) Troponin Likely demand in setting of CVA, afib   - ECHO: EF 15%, Mod MR/TR severe diffuse hypokinesis LV, mild hypokinesis RV   - Torpol Xl 50 mg po q12h  - Spirinolactone 25 mg po qdaily  - lisinopril  titrated to 5 mg po qdaily  - s/p LHC revealing non obstructive disease  4/22  - CArdio f/u appreciated     # Leukocytosis -  likely reactive   - resolving, no signs of infection         DVT PPx: Eliquis      Dispo; d/c planning to Acute vs [facility] when HR stable. Check COVID PCR

## 2022-04-24 NOTE — PROGRESS NOTE ADULT - ASSESSMENT
Nonischemic cardiomyopathy - LVEF 15%, euvolemic-  Continue current GDMT  Yesterday added and made changes to meds and his BP seems even better on meds.  Will continue uptitration of lisinopril to 5mg .   EP team not planning on life vest.    Afib- rate controlled  Unable to rate control secondary to hemorrhagic transformation.  CT new results noted.   cont digoxin and toprol.    HTN- meds as above.    Hyperlipidemia- continue statin.    Other medical issues- Management per primary team.   Thank you for allowing me to participate in the care of this patient. Please feel free to contact me with any questions.

## 2022-04-25 LAB
ANION GAP SERPL CALC-SCNC: 7 MMOL/L — SIGNIFICANT CHANGE UP (ref 5–17)
BUN SERPL-MCNC: 26 MG/DL — HIGH (ref 7–23)
CALCIUM SERPL-MCNC: 9.1 MG/DL — SIGNIFICANT CHANGE UP (ref 8.5–10.1)
CHLORIDE SERPL-SCNC: 107 MMOL/L — SIGNIFICANT CHANGE UP (ref 96–108)
CO2 SERPL-SCNC: 26 MMOL/L — SIGNIFICANT CHANGE UP (ref 22–31)
CREAT SERPL-MCNC: 0.89 MG/DL — SIGNIFICANT CHANGE UP (ref 0.5–1.3)
DIGOXIN SERPL-MCNC: 0.9 NG/ML — SIGNIFICANT CHANGE UP (ref 0.8–2)
EGFR: 96 ML/MIN/1.73M2 — SIGNIFICANT CHANGE UP
GLUCOSE SERPL-MCNC: 108 MG/DL — HIGH (ref 70–99)
HCT VFR BLD CALC: 42.8 % — SIGNIFICANT CHANGE UP (ref 39–50)
HGB BLD-MCNC: 14.1 G/DL — SIGNIFICANT CHANGE UP (ref 13–17)
MCHC RBC-ENTMCNC: 29.5 PG — SIGNIFICANT CHANGE UP (ref 27–34)
MCHC RBC-ENTMCNC: 32.9 GM/DL — SIGNIFICANT CHANGE UP (ref 32–36)
MCV RBC AUTO: 89.5 FL — SIGNIFICANT CHANGE UP (ref 80–100)
PLATELET # BLD AUTO: 208 K/UL — SIGNIFICANT CHANGE UP (ref 150–400)
POTASSIUM SERPL-MCNC: 4.2 MMOL/L — SIGNIFICANT CHANGE UP (ref 3.5–5.3)
POTASSIUM SERPL-SCNC: 4.2 MMOL/L — SIGNIFICANT CHANGE UP (ref 3.5–5.3)
RBC # BLD: 4.78 M/UL — SIGNIFICANT CHANGE UP (ref 4.2–5.8)
RBC # FLD: 12.8 % — SIGNIFICANT CHANGE UP (ref 10.3–14.5)
SODIUM SERPL-SCNC: 140 MMOL/L — SIGNIFICANT CHANGE UP (ref 135–145)
TSH SERPL-MCNC: 0.95 UU/ML — SIGNIFICANT CHANGE UP (ref 0.34–4.82)
WBC # BLD: 10.22 K/UL — SIGNIFICANT CHANGE UP (ref 3.8–10.5)
WBC # FLD AUTO: 10.22 K/UL — SIGNIFICANT CHANGE UP (ref 3.8–10.5)

## 2022-04-25 PROCEDURE — 99233 SBSQ HOSP IP/OBS HIGH 50: CPT

## 2022-04-25 PROCEDURE — 71250 CT THORAX DX C-: CPT | Mod: 26

## 2022-04-25 PROCEDURE — 93010 ELECTROCARDIOGRAM REPORT: CPT

## 2022-04-25 RX ORDER — SACUBITRIL AND VALSARTAN 24; 26 MG/1; MG/1
1 TABLET, FILM COATED ORAL
Refills: 0 | Status: DISCONTINUED | OUTPATIENT
Start: 2022-04-26 | End: 2022-04-28

## 2022-04-25 RX ADMIN — SPIRONOLACTONE 25 MILLIGRAM(S): 25 TABLET, FILM COATED ORAL at 12:39

## 2022-04-25 RX ADMIN — APIXABAN 5 MILLIGRAM(S): 2.5 TABLET, FILM COATED ORAL at 09:20

## 2022-04-25 RX ADMIN — APIXABAN 5 MILLIGRAM(S): 2.5 TABLET, FILM COATED ORAL at 21:24

## 2022-04-25 RX ADMIN — Medication 125 MICROGRAM(S): at 09:20

## 2022-04-25 RX ADMIN — CHLORHEXIDINE GLUCONATE 1 APPLICATION(S): 213 SOLUTION TOPICAL at 09:18

## 2022-04-25 RX ADMIN — Medication 50 MILLIGRAM(S): at 09:20

## 2022-04-25 RX ADMIN — ATORVASTATIN CALCIUM 80 MILLIGRAM(S): 80 TABLET, FILM COATED ORAL at 21:24

## 2022-04-25 RX ADMIN — Medication 50 MILLIGRAM(S): at 21:24

## 2022-04-25 NOTE — PROGRESS NOTE ADULT - SUBJECTIVE AND OBJECTIVE BOX
Patient is a 62y old  Male who presents with a chief complaint of Left sided weakness .       HPI:  62 yr old M with no PMHX was brought to ED by EMS on 22 s/p fall for LE weakness. Patient states at 5:30 am this morning he got up to turn off the TV when his leg gave way and he fell. Pt's sister lives downstairs and heard the fall; she went upstairs and helped him up to a chair, but then pt got up and fell again. Pt's sister was concerned he was having a stroke so she called EMS.  -   Pt seen this am.  No symptoms or overnight issues.  - pt denies any symptoms overnight.     - pt seen this am.      - pt seen this am.  He denies any symptoms this am.      PAST MEDICAL & SURGICAL HISTORY: None  FAMILY HISTORY: mother  at 87 from dementia; Father  at abdelrahman age of 87 from AFIB  Social Hx:  Nonsmoker, no drug or alcohol use      PAST MEDICAL & SURGICAL HISTORY:      MEDICATIONS  (STANDING):  atorvastatin 80 milliGRAM(s) Oral at bedtime  chlorhexidine 4% Liquid 1 Application(s) Topical <User Schedule>  digoxin     Tablet 125 MICROGram(s) Oral daily  lisinopril 2.5 milliGRAM(s) Oral daily  metoprolol succinate ER 50 milliGRAM(s) Oral two times a day  spironolactone 25 milliGRAM(s) Oral daily    MEDICATIONS  (PRN):  acetaminophen     Tablet .. 650 milliGRAM(s) Oral every 6 hours PRN Mild Pain (1 - 3), Moderate Pain (4 - 6)      FAMILY HISTORY:      SOCIAL HISTORY: no recent smoking     REVIEW OF SYSTEMS:  CONSTITUTIONAL:    No fatigue, malaise, lethargy.  No fever or chills.  RESPIRATORY:  No cough.  No wheeze.  No hemoptysis.  No shortness of breath.  CARDIOVASCULAR:  No chest pains.  No palpitations. No shortness of breath, No orthopnea or PND.  GASTROINTESTINAL:  No abdominal pain.  No nausea or vomiting.    GENITOURINARY:    No hematuria.    MUSCULOSKELETAL:  No musculoskeletal pain.  No joint swelling.  No arthritis.  PSYCHIATRIC:  No confusion  SKIN:  No rashes.        ICU Vital Signs Last 24 Hrs  T(C): 36.5 (2022 08:27), Max: 36.9 (2022 09:13)  T(F): 97.7 (2022 08:27), Max: 98.4 (2022 09:13)  HR: 62 (2022 08:27) (57 - 73)  BP: 113/66 (2022 08:27) (103/58 - 126/84)  BP(mean): --  ABP: --  ABP(mean): --  RR: 18 (2022 08:27) (18 - 18)  SpO2: 99% (2022 08:27) (97% - 100%)        PHYSICAL EXAM-    Constitutional: cachectic male in no acute distress   Head: Head is normocephalic and atraumatic.      Neck:  No JVD.     Cardiovascular: irregular rate and rhythm without S3, S4. No murmurs or rubs are appreciated.      Respiratory: Breath sounds are normal. No rales. No wheezing.    Abdomen: Soft, nontender, nondistended with positive bowel sounds.      Extremity: No tenderness. No  pitting edema     Neurologic: The patient is alert and oriented.      Skin: No rash, no obvious lesions noted.      Psychiatric: The patient appears to be emotionally stable.      INTERPRETATION OF TELEMETRY: afib 80/min , 3.1 sec pause     ECG: afib     I&O's Detail      LABS:                                   14.1   10.22 )-----------( 208      ( 2022 07:46 )             42.8     04-    138  |  105  |  29<H>  ----------------------------<  210<H>  4.1   |  24  |  1.05    Ca    9.3      2022 10:50    TPro  6.7  /  Alb  2.8<L>  /  TBili  0.8  /  DBili  x   /  AST  24  /  ALT  34  /  AlkPhos  108  04-24        LIVER FUNCTIONS - ( 2022 10:50 )  Alb: 2.8 g/dL / Pro: 6.7 gm/dL / ALK PHOS: 108 U/L / ALT: 34 U/L / AST: 24 U/L / GGT: x             12 Chol 149 LDL -- HDL 43 Trig 81      I&O's Summary    BNP  RADIOLOGY & ADDITIONAL STUDIES:  < from: TTE Echo Complete w/o Contrast w/ Doppler (22 @ 09:02) >     Impression     Summary     Moderate (2+) mitral regurgitation is present.   Normal aortic valve structure and function.   Moderate (2+) tricuspid valve regurgitation is present.   The left ventricle cavity is mildly dilated.   Estimated left ventricular ejection fraction is 15 %.   Severe, diffuse hypokinesis of the left ventricle is present.   The right ventricle exhibits mild diffuse hypokinesis, and mild   depression   of contractility.   Pleural effusion - is present.     Signature     ----------------------------------------------------------------   Electronically signed by Anders Marcelino MD(Interpreting   physician) on 2022 10:55 AM   ----------------------------------------------------------------    < end of copied text >

## 2022-04-25 NOTE — PROGRESS NOTE ADULT - ASSESSMENT
Nonischemic cardiomyopathy - LVEF 15%, euvolemic-  With uptitration of lisinopril and bb doing well.  Will consider starting entresto today night, 36 hr window between entresto and lisinopril.   Will hold off lisinopril .  Pt to f/u and see me within one week after DC  He needs aggressive management of his cardiomyopathy.    EP team not planning on life vest.    Afib- rate controlled  Unable to rate control secondary to hemorrhagic transformation.  CT new results noted.   cont digoxin and toprol.    HTN- meds as above.    Hyperlipidemia- continue statin.    Other medical issues- Management per primary team.   Thank you for allowing me to participate in the care of this patient. Please feel free to contact me with any questions.    Nonischemic cardiomyopathy - LVEF 15%, euvolemic-  With uptitration of lisinopril and bb doing well.  Will consider starting entresto today night, 36 hr window between entresto and lisinopril.   Will hold off lisinopril .  Pt to f/u and see me within one week after DC.  spoke to Dr Gutierres.   He needs aggressive management of his cardiomyopathy.    EP team not planning on life vest.    Afib- rate controlled  Unable to rate control secondary to hemorrhagic transformation.  CT new results noted.   cont digoxin and toprol.    HTN- meds as above.    Hyperlipidemia- continue statin.    Other medical issues- Management per primary team.   Thank you for allowing me to participate in the care of this patient. Please feel free to contact me with any questions.

## 2022-04-25 NOTE — PROGRESS NOTE ADULT - ASSESSMENT
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 Smaller acute infarctions involve the anterior inferior RIGHT frontal lobe, the posterior RIGHT basal ganglia and posterior RIGHT insular cortex as well as the posterior RIGHT temporal on lateral RIGHT   occipital cortex,  with restricted diffusion.  - repeat CT head stable, Pt was clered by neuro Sx to start A/c   - C/w neuro checks   - BPs at goal  - will repeat CT head in am   - C/w statin   - C/w PT         # New onset a-fib   - tele monitoring: AFIB, HR overall improved but not amulated   - C/w  Toprol XL 50 mg po q12h  - dig 125 mg po qdaily  - started on Eliquis on 4/23   - Cardiology is following.    # Acute  HEFrEF   # Severely reduced LVEF of 15% suspected secondary to tachyarrhythmia   # (+) Troponin Likely demand in setting of CVA, afib   - ECHO: EF 15%, Mod MR/TR severe diffuse hypokinesis LV, mild hypokinesis RV   - Torpol Xl 50 mg po q12h  - Spirinolactone 25 mg po qdaily  - lisinopril  titrated to 5 mg po qdaily  - s/p LHC revealing non obstructive disease  4/22  - CArdio f/u appreciated     # Leukocytosis -  likely reactive   - resolving, no signs of infection         DVT PPx: Eliquis      Dispo; d/c planning to [facility] vs [facility] when HR stable. Check COVID PCR  52 y.o male with no known PMH, not on meds admitted for:     # Acute right CVA, with hemorrhagic transformation suspected embolic  etiology in settings of  new onset Afib  - MRI: Acute infarction within the anterior RIGHT basal ganglia and straightening restricted diffusion and central hemorrhage measuring 2.4 cm, with mass effect on the anterior horn of the RIGHT lateral   ventricle. Smaller acute infarctions involve the anterior inferior RIGHT frontal lobe, the posterior RIGHT basal ganglia and posterior RIGHT insular cortex as well as the posterior RIGHT temporal on lateral RIGHT   occipital cortex,  with restricted diffusion.  - repeat CT head stable, Pt was clered by neuro Sx to start A/c   - C/w neuro checks   - BPs at goal  -  repeat CT head in am   - C/w statin   - C/w PT         # New onset a-fib   - tele monitoring: AFIB, HR overall improved but not amulated   - C/w  Toprol XL 50 mg po q12h  - dig 125 mg po qdaily  - started on Eliquis on 4/23   - Cardiology is following.    # Acute  HEFrEF   # Severely reduced LVEF of 15% suspected secondary to tachyarrhythmia   # (+) Troponin Likely demand in setting of CVA, afib   - ECHO: EF 15%, Mod MR/TR severe diffuse hypokinesis LV, mild hypokinesis RV   - Torpol Xl 50 mg po q12h  - Spirinolactone 25 mg po qdaily  - lisinopril  d/suzi plan to start Entresto tonight ( 36h after last dose of lisinopril)   - s/p LHC revealing non obstructive disease  4/22  - D/w Dr Angel     # Leukocytosis -  likely reactive   - resolving, no signs of infection         DVT PPx: Eliquis      Dispo; d/c planning  preferably to rehab. D/w CM,  unclear if Pt has rehab insurance coverage

## 2022-04-25 NOTE — PROGRESS NOTE ADULT - SUBJECTIVE AND OBJECTIVE BOX
CC: Left sided weakness (24 Apr 2022 06:25)    HPI:  62 yr old M with no PMHX was brought to ED by EMS on 4/11/22 s/p fall for LE weakness. Patient states at 5:30 am this morning he got up to turn off the TV when his leg gave way and he fell. Pt's sister lives downstairs and heard the fall; she went upstairs and helped him up to a chair, but then pt got up and fell again. Pt's sister was concerned he was having a stroke so she called EMS.     In ED the patient was evaluated, and code stroke was called. CTH showed no acute infarct or hemorrhage. CTA head and neck showed no LVO or stenosis or aneurysms. CT perfusion showed no core infarct. IV TPA was not given because LKN was last night, NIHSS 3. Pt was given 2 baby ASA. He was found to have new onset Afib in ED, and upon my evaluation BP was elevated at 156/118.      INTERVAL HPI/ OVERNIGHT EVENTS:  chart reviewed, Pt was seen and examined, reports feeling tired not sleeping well in the hospital. States that now hyst L leg feels weak, otherwise is better. Agrees with d/c to rehab when ready     Vital Signs Last 24 Hrs  T(C): 36.5 (25 Apr 2022 08:27), Max: 36.6 (24 Apr 2022 21:05)  T(F): 97.7 (25 Apr 2022 08:27), Max: 97.9 (24 Apr 2022 21:05)  HR: 62 (25 Apr 2022 08:27) (57 - 73)  BP: 113/66 (25 Apr 2022 08:27) (104/73 - 126/84)  BP(mean): --  RR: 18 (25 Apr 2022 08:27) (18 - 18)  SpO2: 99% (25 Apr 2022 08:27) (97% - 100%)    REVIEW OF SYSTEMS:  All other review of systems is negative unless indicated above.      PHYSICAL EXAM:  General: Well developed;  in no acute distress  Eyes:  EOMI; conjunctiva and sclera clear  Head: Normocephalic; L temporal area laceration, repaired sutures D/C/I   ENMT: No nasal discharge; airway clear  Neck: Supple; non tender; no masses  Respiratory: No wheezes, rales or rhonchi  Cardiovascular: Irregular rate and rhythm. S1 and S2 Normal;   Gastrointestinal: Soft non-tender non-distended; Normal bowel sounds  Genitourinary: No  suprapubic  tenderness  Extremities: No  edema  Vascular: Peripheral pulses palpable 2+ bilaterally  Neurological: Alert and oriented x3, speech clear, LLE4/5.   Skin: Warm and dry. No acute rash  Lymph Nodes: No acute cervical adenopathy  Musculoskeletal: Normal muscle tone, without deformities  Psychiatric: Cooperative and appropriate      LABS:                         14.6   10.47 )-----------( 241      ( 24 Apr 2022 10:50 )             42.1     24 Apr 2022 10:50    138    |  105    |  29     ----------------------------<  210    4.1     |  24     |  1.05     Ca    9.3        24 Apr 2022 10:50    TPro  6.7    /  Alb  2.8    /  TBili  0.8    /  DBili  x      /  AST  24     /  ALT  34     /  AlkPhos  108    24 Apr 2022 10:50      LIVER FUNCTIONS - ( 24 Apr 2022 10:50 )  Alb: 2.8 g/dL / Pro: 6.7 gm/dL / ALK PHOS: 108 U/L / ALT: 34 U/L / AST: 24 U/L / GGT: x               MEDICATIONS  (STANDING):  apixaban 5 milliGRAM(s) Oral every 12 hours  atorvastatin 80 milliGRAM(s) Oral at bedtime  chlorhexidine 4% Liquid 1 Application(s) Topical <User Schedule>  digoxin     Tablet 125 MICROGram(s) Oral daily  lisinopril 5 milliGRAM(s) Oral daily  metoprolol succinate ER 50 milliGRAM(s) Oral two times a day  spironolactone 25 milliGRAM(s) Oral <User Schedule>    MEDICATIONS  (PRN):  acetaminophen     Tablet .. 650 milliGRAM(s) Oral every 6 hours PRN Mild Pain (1 - 3), Moderate Pain (4 - 6)      RADIOLOGY & ADDITIONAL TESTS:      ACC: [id number] EXAM:  ECHO TTE WO CON COMP W DOPP                        PROCEDURE DATE:  04/12/2022  >   Summary     Moderate (2+) mitral regurgitation is present.   Normal aortic valve structure and function.   Moderate (2+) tricuspid valve regurgitation is present.   The left ventricle cavity is mildly dilated.   Estimated left ventricular ejection fraction is 15 %.   Severe, diffuse hypokinesis of the left ventricle is present.   The right ventricle exhibits mild diffuse hypokinesis, and mild   depression   of contractility.   Pleural effusion - is present.        ACC: [id number] EXAM:  CT BRAIN                        PROCEDURE DATE:  04/22/2022        FINDINGS:   CT dated 04/17/2022 available for review.  The brain demonstrates evolving hemorrhagic infarction in the RIGHT basal   ganglia with effacement of RIGHT lateral ventricle but no midline shift.   No acute cerebral cortical infarct is seen.    The orbits are unremarkable.  The paranasal sinuses are clear.  The nasal   cavity appears intact.  The nasopharynx is symmetric.  The central skull   base, petrous temporal bones and calvarium remain intact.      IMPRESSION:  evolving hemorrhagic infarction in the RIGHT basal ganglia   with effacement of RIGHT lateral ventricle but no midline shift.             CC: Left sided weakness (24 Apr 2022 06:25)    HPI:  62 yr old M with no PMHX was brought to ED by EMS on 4/11/22 s/p fall for LE weakness. Patient states at 5:30 am this morning he got up to turn off the TV when his leg gave way and he fell. Pt's sister lives downstairs and heard the fall; she went upstairs and helped him up to a chair, but then pt got up and fell again. Pt's sister was concerned he was having a stroke so she called EMS.     In ED the patient was evaluated, and code stroke was called. CTH showed no acute infarct or hemorrhage. CTA head and neck showed no LVO or stenosis or aneurysms. CT perfusion showed no core infarct. IV TPA was not given because LKN was last night, NIHSS 3. Pt was given 2 baby ASA. He was found to have new onset Afib in ED, and upon my evaluation BP was elevated at 156/118.      INTERVAL HPI/ OVERNIGHT EVENTS:   Pt was seen and examined,  no new complains, POC/d/c planning  discussed. Will be started on Entresto tonight         Vital Signs Last 24 Hrs  T(C): 36.5 (25 Apr 2022 08:27), Max: 36.6 (24 Apr 2022 21:05)  T(F): 97.7 (25 Apr 2022 08:27), Max: 97.9 (24 Apr 2022 21:05)  HR: 62 (25 Apr 2022 08:27) (57 - 73)  BP: 113/66 (25 Apr 2022 08:27) (104/73 - 126/84)  RR: 18 (25 Apr 2022 08:27) (18 - 18)  SpO2: 99% (25 Apr 2022 08:27) (97% - 100%)    REVIEW OF SYSTEMS:  All other review of systems is negative unless indicated above.      PHYSICAL EXAM:  General: Well developed;  in no acute distress  Eyes:  EOMI; conjunctiva and sclera clear  Head: Normocephalic; L temporal area laceration, repaired sutures D/C/I   ENMT: No nasal discharge; airway clear  Neck: Supple; non tender; no masses  Respiratory: No wheezes, rales or rhonchi  Cardiovascular: Irregular rate and rhythm. S1 and S2 Normal;   Gastrointestinal: Soft non-tender non-distended; Normal bowel sounds  Genitourinary: No  suprapubic  tenderness  Extremities: No  edema  Vascular: Peripheral pulses palpable 2+ bilaterally  Neurological: Alert and oriented x3, speech clear, LLE4/5.   Skin: Warm and dry. No acute rash  Lymph Nodes: No acute cervical adenopathy  Musculoskeletal: Normal muscle tone, without deformities  Psychiatric: Cooperative and appropriate      LABS:                         14.1   10.22 )-----------( 208      ( 25 Apr 2022 07:46 )             42.8     04-25    140  |  107  |  26<H>  ----------------------------<  108<H>  4.2   |  26  |  0.89    Ca    9.1      25 Apr 2022 07:46    TPro  6.7  /  Alb  2.8<L>  /  TBili  0.8  /  DBili  x   /  AST  24  /  ALT  34  /  AlkPhos  108  04-24        LIVER FUNCTIONS - ( 24 Apr 2022 10:50 )  Alb: 2.8 g/dL / Pro: 6.7 gm/dL / ALK PHOS: 108 U/L / ALT: 34 U/L / AST: 24 U/L / GGT: x                                 14.6   10.47 )-----------( 241      ( 24 Apr 2022 10:50 )             42.1     24 Apr 2022 10:50    138    |  105    |  29     ----------------------------<  210    4.1     |  24     |  1.05     Ca    9.3        24 Apr 2022 10:50    TPro  6.7    /  Alb  2.8    /  TBili  0.8    /  DBili  x      /  AST  24     /  ALT  34     /  AlkPhos  108    24 Apr 2022 10:50      LIVER FUNCTIONS - ( 24 Apr 2022 10:50 )  Alb: 2.8 g/dL / Pro: 6.7 gm/dL / ALK PHOS: 108 U/L / ALT: 34 U/L / AST: 24 U/L / GGT: x               MEDICATIONS  (STANDING):  apixaban 5 milliGRAM(s) Oral every 12 hours  atorvastatin 80 milliGRAM(s) Oral at bedtime  chlorhexidine 4% Liquid 1 Application(s) Topical <User Schedule>  digoxin     Tablet 125 MICROGram(s) Oral daily  lisinopril 5 milliGRAM(s) Oral daily  metoprolol succinate ER 50 milliGRAM(s) Oral two times a day  spironolactone 25 milliGRAM(s) Oral <User Schedule>    MEDICATIONS  (PRN):  acetaminophen     Tablet .. 650 milliGRAM(s) Oral every 6 hours PRN Mild Pain (1 - 3), Moderate Pain (4 - 6)      RADIOLOGY & ADDITIONAL TESTS:      ACC: [id number] EXAM:  ECHO TTE WO CON COMP W DOPP                        PROCEDURE DATE:  04/12/2022  >   Summary     Moderate (2+) mitral regurgitation is present.   Normal aortic valve structure and function.   Moderate (2+) tricuspid valve regurgitation is present.   The left ventricle cavity is mildly dilated.   Estimated left ventricular ejection fraction is 15 %.   Severe, diffuse hypokinesis of the left ventricle is present.   The right ventricle exhibits mild diffuse hypokinesis, and mild   depression   of contractility.   Pleural effusion - is present.        ACC: [id number] EXAM:  CT BRAIN                        PROCEDURE DATE:  04/22/2022        FINDINGS:   CT dated 04/17/2022 available for review.  The brain demonstrates evolving hemorrhagic infarction in the RIGHT basal   ganglia with effacement of RIGHT lateral ventricle but no midline shift.   No acute cerebral cortical infarct is seen.    The orbits are unremarkable.  The paranasal sinuses are clear.  The nasal   cavity appears intact.  The nasopharynx is symmetric.  The central skull   base, petrous temporal bones and calvarium remain intact.      IMPRESSION:  evolving hemorrhagic infarction in the RIGHT basal ganglia   with effacement of RIGHT lateral ventricle but no midline shift.

## 2022-04-26 LAB
ANION GAP SERPL CALC-SCNC: 4 MMOL/L — LOW (ref 5–17)
BUN SERPL-MCNC: 23 MG/DL — SIGNIFICANT CHANGE UP (ref 7–23)
CALCIUM SERPL-MCNC: 9.1 MG/DL — SIGNIFICANT CHANGE UP (ref 8.5–10.1)
CHLORIDE SERPL-SCNC: 111 MMOL/L — HIGH (ref 96–108)
CO2 SERPL-SCNC: 26 MMOL/L — SIGNIFICANT CHANGE UP (ref 22–31)
CREAT SERPL-MCNC: 0.9 MG/DL — SIGNIFICANT CHANGE UP (ref 0.5–1.3)
EGFR: 95 ML/MIN/1.73M2 — SIGNIFICANT CHANGE UP
GLUCOSE SERPL-MCNC: 107 MG/DL — HIGH (ref 70–99)
HCT VFR BLD CALC: 43.2 % — SIGNIFICANT CHANGE UP (ref 39–50)
HGB BLD-MCNC: 14.5 G/DL — SIGNIFICANT CHANGE UP (ref 13–17)
MCHC RBC-ENTMCNC: 29.8 PG — SIGNIFICANT CHANGE UP (ref 27–34)
MCHC RBC-ENTMCNC: 33.6 GM/DL — SIGNIFICANT CHANGE UP (ref 32–36)
MCV RBC AUTO: 88.7 FL — SIGNIFICANT CHANGE UP (ref 80–100)
PLATELET # BLD AUTO: 250 K/UL — SIGNIFICANT CHANGE UP (ref 150–400)
POTASSIUM SERPL-MCNC: 4.4 MMOL/L — SIGNIFICANT CHANGE UP (ref 3.5–5.3)
POTASSIUM SERPL-SCNC: 4.4 MMOL/L — SIGNIFICANT CHANGE UP (ref 3.5–5.3)
RBC # BLD: 4.87 M/UL — SIGNIFICANT CHANGE UP (ref 4.2–5.8)
RBC # FLD: 12.9 % — SIGNIFICANT CHANGE UP (ref 10.3–14.5)
SODIUM SERPL-SCNC: 141 MMOL/L — SIGNIFICANT CHANGE UP (ref 135–145)
WBC # BLD: 11.69 K/UL — HIGH (ref 3.8–10.5)
WBC # FLD AUTO: 11.69 K/UL — HIGH (ref 3.8–10.5)

## 2022-04-26 PROCEDURE — 70450 CT HEAD/BRAIN W/O DYE: CPT | Mod: 26

## 2022-04-26 PROCEDURE — 99232 SBSQ HOSP IP/OBS MODERATE 35: CPT

## 2022-04-26 RX ORDER — SACUBITRIL AND VALSARTAN 24; 26 MG/1; MG/1
1 TABLET, FILM COATED ORAL
Qty: 60 | Refills: 0
Start: 2022-04-26 | End: 2022-05-25

## 2022-04-26 RX ORDER — APIXABAN 2.5 MG/1
1 TABLET, FILM COATED ORAL
Qty: 60 | Refills: 0
Start: 2022-04-26 | End: 2022-05-25

## 2022-04-26 RX ADMIN — SPIRONOLACTONE 25 MILLIGRAM(S): 25 TABLET, FILM COATED ORAL at 17:21

## 2022-04-26 RX ADMIN — Medication 50 MILLIGRAM(S): at 11:19

## 2022-04-26 RX ADMIN — SACUBITRIL AND VALSARTAN 1 TABLET(S): 24; 26 TABLET, FILM COATED ORAL at 22:09

## 2022-04-26 RX ADMIN — ATORVASTATIN CALCIUM 80 MILLIGRAM(S): 80 TABLET, FILM COATED ORAL at 22:09

## 2022-04-26 RX ADMIN — APIXABAN 5 MILLIGRAM(S): 2.5 TABLET, FILM COATED ORAL at 11:20

## 2022-04-26 RX ADMIN — APIXABAN 5 MILLIGRAM(S): 2.5 TABLET, FILM COATED ORAL at 22:10

## 2022-04-26 RX ADMIN — CHLORHEXIDINE GLUCONATE 1 APPLICATION(S): 213 SOLUTION TOPICAL at 11:22

## 2022-04-26 RX ADMIN — Medication 50 MILLIGRAM(S): at 22:10

## 2022-04-26 RX ADMIN — Medication 125 MICROGRAM(S): at 11:19

## 2022-04-26 RX ADMIN — SACUBITRIL AND VALSARTAN 1 TABLET(S): 24; 26 TABLET, FILM COATED ORAL at 11:20

## 2022-04-26 NOTE — PROVIDER CONTACT NOTE (OTHER) - REASON
3.4s pause
cardiology consult
Cardiology
hypertension and tachycardia
3.19 second pause on cardiac monitor

## 2022-04-26 NOTE — PROGRESS NOTE ADULT - SUBJECTIVE AND OBJECTIVE BOX
Cardiology Progress Note    HPI: 62 yr old M with no PMHX was brought to ED by EMS on 22 s/p fall for LE weakness. Patient states at 5:30 am this morning he got up to turn off the TV when his leg gave way and he fell. Pt's sister lives downstairs and heard the fall; she went upstairs and helped him up to a chair, but then pt got up and fell again. Pt's sister was concerned he was having a stroke so she called EMS.    . No CP/SOB. Chart reviewed. No events last pm.   Afib on tele in 60s.     PAST MEDICAL & SURGICAL HISTORY: None  FAMILY HISTORY: mother  at 87 from dementia; Father  at abdelrahman age of 87 from AFIB  Social Hx:  Nonsmoker, no drug or alcohol use      PAST MEDICAL & SURGICAL HISTORY:      MEDICATIONS  (STANDING):  atorvastatin 80 milliGRAM(s) Oral at bedtime  chlorhexidine 4% Liquid 1 Application(s) Topical <User Schedule>  digoxin     Tablet 125 MICROGram(s) Oral daily  lisinopril 2.5 milliGRAM(s) Oral daily  metoprolol succinate ER 50 milliGRAM(s) Oral two times a day  spironolactone 25 milliGRAM(s) Oral daily    MEDICATIONS  (PRN):  acetaminophen     Tablet .. 650 milliGRAM(s) Oral every 6 hours PRN Mild Pain (1 - 3), Moderate Pain (4 - 6)      FAMILY HISTORY:      SOCIAL HISTORY: no recent smoking     REVIEW OF SYSTEMS:  CONSTITUTIONAL:    No fatigue, malaise, lethargy.  No fever or chills.  RESPIRATORY:  No cough.  No wheeze.  No hemoptysis.  No shortness of breath.  CARDIOVASCULAR:  No chest pains.  No palpitations. No shortness of breath, No orthopnea or PND.  GASTROINTESTINAL:  No abdominal pain.  No nausea or vomiting.    GENITOURINARY:    No hematuria.    MUSCULOSKELETAL:  No musculoskeletal pain.  No joint swelling.  No arthritis.  PSYCHIATRIC:  No confusion  SKIN:  No rashes.        ICU Vital Signs Last 24 Hrs  T(C): 36.5 (2022 08:27), Max: 36.9 (2022 09:13)  T(F): 97.7 (2022 08:27), Max: 98.4 (2022 09:13)  HR: 62 (2022 08:27) (57 - 73)  BP: 113/66 (2022 08:27) (103/58 - 126/84)  BP(mean): --  ABP: --  ABP(mean): --  RR: 18 (2022 08:27) (18 - 18)  SpO2: 99% (2022 08:27) (97% - 100%)        PHYSICAL EXAM-    Constitutional: cachectic male in no acute distress   Head: Head is normocephalic and atraumatic.      Neck:  No JVD.     Cardiovascular: irregular rate and rhythm without S3, S4. No murmurs or rubs are appreciated.      Respiratory: Breath sounds are normal. No rales. No wheezing.    Abdomen: Soft, nontender, nondistended with positive bowel sounds.      Extremity: No tenderness. No  pitting edema     Neurologic: The patient is alert and oriented.        INTERPRETATION OF TELEMETRY: afib 80/min , 3.1 sec pause     ECG: afib     I&O's Detail      LABS:                                   14.1   10.22 )-----------( 208      ( 2022 07:46 )             42.8     04-24    138  |  105  |  29<H>  ----------------------------<  210<H>  4.1   |  24  |  1.05    Ca    9.3      2022 10:50    TPro  6.7  /  Alb  2.8<L>  /  TBili  0.8  /  DBili  x   /  AST  24  /  ALT  34  /  AlkPhos  108          LIVER FUNCTIONS - ( 2022 10:50 )  Alb: 2.8 g/dL / Pro: 6.7 gm/dL / ALK PHOS: 108 U/L / ALT: 34 U/L / AST: 24 U/L / GGT: x              Chol 149 LDL -- HDL 43 Trig 81      I&O's Summary    BNP  RADIOLOGY & ADDITIONAL STUDIES:  < from: TTE Echo Complete w/o Contrast w/ Doppler (22 @ 09:02) >     Impression     Summary     Moderate (2+) mitral regurgitation is present.   Normal aortic valve structure and function.   Moderate (2+) tricuspid valve regurgitation is present.   The left ventricle cavity is mildly dilated.   Estimated left ventricular ejection fraction is 15 %.   Severe, diffuse hypokinesis of the left ventricle is present.   The right ventricle exhibits mild diffuse hypokinesis, and mild   depression   of contractility.   Pleural effusion - is present.     Signature     ----------------------------------------------------------------   Electronically signed by Anders Marcelino MD(Interpreting   physician) on 2022 10:55 AM   ----------------------------------------------------------------    < end of copied text >

## 2022-04-26 NOTE — PROVIDER CONTACT NOTE (OTHER) - ASSESSMENT
Pt at rest, denies cp, offers no complaints, AF on tele  Prior hx 3.19 s pause noted
hr afib 100-115. sbp 143
pt was sleeping. when woken for VS check, no reports of chest pain/discomfort, dizziness, AOx4. VSS as noted in flowsheet

## 2022-04-26 NOTE — PROVIDER CONTACT NOTE (OTHER) - SITUATION
Consult called in spoke with Yissel
Spoke to Dom.
patient on cardizem gtt for afib and sbp goal <140
pt admitted for stroke. a-fib on cardiac monitor. experienced a 3.19 second pause during sleep.
Presents with 3.4s pause

## 2022-04-26 NOTE — PROVIDER CONTACT NOTE (OTHER) - ACTION/TREATMENT ORDERED:
no interventions required, continue to monitor, as per MD SIMON to make day team aware.
Will continue to monitor
lopressor ordered q8hr

## 2022-04-26 NOTE — PROGRESS NOTE ADULT - ASSESSMENT
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 Smaller acute infarctions involve the anterior inferior RIGHT frontal lobe, the posterior RIGHT basal ganglia and posterior RIGHT insular cortex as well as the posterior RIGHT temporal on lateral RIGHT   occipital cortex,  with restricted diffusion.  - repeat CT head stable, Pt was clered by neuro Sx to start A/c   - C/w neuro checks   -  repeat CT head  stable   - C/w statin   - C/w PT     # New onset a-fib   - tele monitoring: AFIB, HR improved 55-77,  has pauses 1.8sec last 24h    - C/w  Toprol XL 50 mg po q12h  - dig 125 mg po qdaily  - started on Eliquis on 4/23   - Cardiology is following.    # Acute  HEFrEF   # Severely reduced LVEF of 15% . Non Ischemic Cardiomyopathy suspected secondary to tachyarrhythmia.   # (+) Troponin Likely demand in setting of CVA, afib   - ECHO: EF 15%, Mod MR/TR severe diffuse hypokinesis LV, mild hypokinesis RV   - Torpol Xl 50 mg po q12h  - Spironolactone 25 mg po qdaily  - off  lisinopril  now on  Entresto, monitor BP closely   - s/p LHC revealing non obstructive disease  4/22  -    # Leukocytosis -  likely reactive   - resolving, no signs of infection     DVT PPx: Eliquis      Dispo; d/c planning  preferably to rehab. D/w CM,  unclear if Pt has rehab insurance coverage.

## 2022-04-26 NOTE — PROGRESS NOTE ADULT - ASSESSMENT
1. Nonischemic cardiomyopathy - LVEF 15%, euvolemic- no CP/SOB at present.  Cont entresto, Bbl, digoxin for now.   [facility] with nonobstructive CAD.  EP team not planning on life vest.    2. Afib- rate controlled  Unable to rate control secondary to hemorrhagic transformation.  CT new results noted.   cont digoxin and toprol.    3. HTN- meds as above.    4. Hyperlipidemia- continue statin.    5. D/C plannng. DVT proph.

## 2022-04-26 NOTE — PROGRESS NOTE ADULT - SUBJECTIVE AND OBJECTIVE BOX
CC: Left sided weakness (24 Apr 2022 06:25)    HPI:  62 yr old M with no PMHX was brought to ED by EMS on 4/11/22 s/p fall for LE weakness. Patient states at 5:30 am this morning he got up to turn off the TV when his leg gave way and he fell. Pt's sister lives downstairs and heard the fall; she went upstairs and helped him up to a chair, but then pt got up and fell again. Pt's sister was concerned he was having a stroke so she called EMS.     In ED the patient was evaluated, and code stroke was called. CTH showed no acute infarct or hemorrhage. CTA head and neck showed no LVO or stenosis or aneurysms. CT perfusion showed no core infarct. IV TPA was not given because LKN was last night, NIHSS 3. Pt was given 2 baby ASA. He was found to have new onset Afib in ED, and upon my evaluation BP was elevated at 156/118.      INTERVAL HPI/ OVERNIGHT EVENTS:   Pt was seen and examined,   OOb to chair, overall is better, denies SOB or CP, no HA. POC discussed      Vital Signs Last 24 Hrs  T(C): 36.5 (25 Apr 2022 08:27), Max: 36.6 (24 Apr 2022 21:05)  T(F): 97.7 (25 Apr 2022 08:27), Max: 97.9 (24 Apr 2022 21:05)  HR: 62 (25 Apr 2022 08:27) (57 - 73)  BP: 113/66 (25 Apr 2022 08:27) (104/73 - 126/84)  RR: 18 (25 Apr 2022 08:27) (18 - 18)  SpO2: 99% (25 Apr 2022 08:27) (97% - 100%)    REVIEW OF SYSTEMS:  All other review of systems is negative unless indicated above.      PHYSICAL EXAM:  General: Well developed;  in no acute distress  Eyes:  EOMI; conjunctiva and sclera clear  Head: Normocephalic; L temporal area laceration, repaired sutures D/C/I   ENMT: No nasal discharge; airway clear  Neck: Supple; non tender; no masses  Respiratory: No wheezes, rales or rhonchi  Cardiovascular: Irregular rate and rhythm. S1 and S2 Normal;   Gastrointestinal: Soft non-tender non-distended; Normal bowel sounds  Genitourinary: No  suprapubic  tenderness  Extremities: No  edema  Vascular: Peripheral pulses palpable 2+ bilaterally  Neurological: Alert and oriented x3, speech clear, LLE4/5.   Skin: Warm and dry. No acute rash  Lymph Nodes: No acute cervical adenopathy  Musculoskeletal: Normal muscle tone, without deformities  Psychiatric: Cooperative and appropriate      LABS:                         14.5   11.69 )-----------( 250      ( 26 Apr 2022 07:59 )             43.2     04-26    141  |  111<H>  |  23  ----------------------------<  107<H>  4.4   |  26  |  0.90    Ca    9.1      26 Apr 2022 07:59                          14.1   10.22 )-----------( 208      ( 25 Apr 2022 07:46 )             42.8     04-25    140  |  107  |  26<H>  ----------------------------<  108<H>  4.2   |  26  |  0.89    Ca    9.1      25 Apr 2022 07:46    TPro  6.7  /  Alb  2.8<L>  /  TBili  0.8  /  DBili  x   /  AST  24  /  ALT  34  /  AlkPhos  108  04-24        LIVER FUNCTIONS - ( 24 Apr 2022 10:50 )  Alb: 2.8 g/dL / Pro: 6.7 gm/dL / ALK PHOS: 108 U/L / ALT: 34 U/L / AST: 24 U/L / GGT: x                                 14.6   10.47 )-----------( 241      ( 24 Apr 2022 10:50 )             42.1     24 Apr 2022 10:50    138    |  105    |  29     ----------------------------<  210    4.1     |  24     |  1.05     Ca    9.3        24 Apr 2022 10:50    TPro  6.7    /  Alb  2.8    /  TBili  0.8    /  DBili  x      /  AST  24     /  ALT  34     /  AlkPhos  108    24 Apr 2022 10:50      LIVER FUNCTIONS - ( 24 Apr 2022 10:50 )  Alb: 2.8 g/dL / Pro: 6.7 gm/dL / ALK PHOS: 108 U/L / ALT: 34 U/L / AST: 24 U/L / GGT: x             MEDICATIONS  (STANDING):  apixaban 5 milliGRAM(s) Oral every 12 hours  atorvastatin 80 milliGRAM(s) Oral at bedtime  chlorhexidine 4% Liquid 1 Application(s) Topical <User Schedule>  digoxin     Tablet 125 MICROGram(s) Oral daily  metoprolol succinate ER 50 milliGRAM(s) Oral two times a day  sacubitril 24 mG/valsartan 26 mG 1 Tablet(s) Oral two times a day  spironolactone 25 milliGRAM(s) Oral <User Schedule>    MEDICATIONS  (PRN):  acetaminophen     Tablet .. 650 milliGRAM(s) Oral every 6 hours PRN Mild Pain (1 - 3), Moderate Pain (4 - 6)    RADIOLOGY & ADDITIONAL TESTS:      ACC: [id number] EXAM:  ECHO TTE WO CON COMP W DOPP                        PROCEDURE DATE:  04/12/2022  >   Summary     Moderate (2+) mitral regurgitation is present.   Normal aortic valve structure and function.   Moderate (2+) tricuspid valve regurgitation is present.   The left ventricle cavity is mildly dilated.   Estimated left ventricular ejection fraction is 15 %.   Severe, diffuse hypokinesis of the left ventricle is present.   The right ventricle exhibits mild diffuse hypokinesis, and mild   depression   of contractility.   Pleural effusion - is present.        ACC: [id number] EXAM:  CT BRAIN                        PROCEDURE DATE:  04/22/2022        FINDINGS:   CT dated 04/17/2022 available for review.  The brain demonstrates evolving hemorrhagic infarction in the RIGHT basal   ganglia with effacement of RIGHT lateral ventricle but no midline shift.   No acute cerebral cortical infarct is seen.    The orbits are unremarkable.  The paranasal sinuses are clear.  The nasal   cavity appears intact.  The nasopharynx is symmetric.  The central skull   base, petrous temporal bones and calvarium remain intact.      IMPRESSION:  evolving hemorrhagic infarction in the RIGHT basal ganglia   with effacement of RIGHT lateral ventricle but no midline shift.

## 2022-04-27 LAB
ANION GAP SERPL CALC-SCNC: 4 MMOL/L — LOW (ref 5–17)
BUN SERPL-MCNC: 24 MG/DL — HIGH (ref 7–23)
CALCIUM SERPL-MCNC: 9.1 MG/DL — SIGNIFICANT CHANGE UP (ref 8.5–10.1)
CHLORIDE SERPL-SCNC: 109 MMOL/L — HIGH (ref 96–108)
CO2 SERPL-SCNC: 26 MMOL/L — SIGNIFICANT CHANGE UP (ref 22–31)
CREAT SERPL-MCNC: 0.9 MG/DL — SIGNIFICANT CHANGE UP (ref 0.5–1.3)
EGFR: 95 ML/MIN/1.73M2 — SIGNIFICANT CHANGE UP
GLUCOSE SERPL-MCNC: 107 MG/DL — HIGH (ref 70–99)
POTASSIUM SERPL-MCNC: 4.4 MMOL/L — SIGNIFICANT CHANGE UP (ref 3.5–5.3)
POTASSIUM SERPL-SCNC: 4.4 MMOL/L — SIGNIFICANT CHANGE UP (ref 3.5–5.3)
SODIUM SERPL-SCNC: 139 MMOL/L — SIGNIFICANT CHANGE UP (ref 135–145)

## 2022-04-27 PROCEDURE — 99231 SBSQ HOSP IP/OBS SF/LOW 25: CPT

## 2022-04-27 RX ADMIN — CHLORHEXIDINE GLUCONATE 1 APPLICATION(S): 213 SOLUTION TOPICAL at 09:52

## 2022-04-27 RX ADMIN — Medication 125 MICROGRAM(S): at 09:53

## 2022-04-27 RX ADMIN — APIXABAN 5 MILLIGRAM(S): 2.5 TABLET, FILM COATED ORAL at 21:45

## 2022-04-27 RX ADMIN — APIXABAN 5 MILLIGRAM(S): 2.5 TABLET, FILM COATED ORAL at 09:53

## 2022-04-27 RX ADMIN — SPIRONOLACTONE 25 MILLIGRAM(S): 25 TABLET, FILM COATED ORAL at 12:33

## 2022-04-27 RX ADMIN — ATORVASTATIN CALCIUM 80 MILLIGRAM(S): 80 TABLET, FILM COATED ORAL at 21:45

## 2022-04-27 RX ADMIN — SACUBITRIL AND VALSARTAN 1 TABLET(S): 24; 26 TABLET, FILM COATED ORAL at 21:45

## 2022-04-27 RX ADMIN — Medication 50 MILLIGRAM(S): at 09:52

## 2022-04-27 RX ADMIN — SACUBITRIL AND VALSARTAN 1 TABLET(S): 24; 26 TABLET, FILM COATED ORAL at 09:53

## 2022-04-27 NOTE — PROGRESS NOTE ADULT - PROVIDER SPECIALTY LIST ADULT
Electrophysiology
Neurology
Cardiology
Hospitalist
Intervent Cardiology
Neurology
Neurosurgery
Cardiology
Electrophysiology
Electrophysiology
Hospitalist
Neurology
Neurology
Neurosurgery
Neurosurgery
Physiatry
Cardiology
Electrophysiology
Hospitalist
Hospitalist
Neurology
Hospitalist
Hospitalist
Cardiology
Cardiology
Critical Care

## 2022-04-27 NOTE — PROGRESS NOTE ADULT - ASSESSMENT
1. Nonischemic cardiomyopathy - LVEF 15%, euvolemic- no CP/SOB at present.  Cont entresto, Bbl, digoxin for now. Further medication titration as outpt.  [facility] with nonobstructive CAD.  EP team not planning on life vest.    2. Afib- rate controlled. Cont eliquis for CVA proph.  cont digoxin and toprol for HR control.    3. HTN- meds as above.    4. Hyperlipidemia- continue statin.    5. D/C plannng. DVT proph.

## 2022-04-27 NOTE — PROGRESS NOTE ADULT - SUBJECTIVE AND OBJECTIVE BOX
CC: Left sided weakness (24 Apr 2022 06:25)    HPI:  62 yr old M with no PMHX was brought to ED by EMS on 4/11/22 s/p fall for LE weakness. Patient states at 5:30 am this morning he got up to turn off the TV when his leg gave way and he fell. Pt's sister lives downstairs and heard the fall; she went upstairs and helped him up to a chair, but then pt got up and fell again. Pt's sister was concerned he was having a stroke so she called EMS.     In ED the patient was evaluated, and code stroke was called. CTH showed no acute infarct or hemorrhage. CTA head and neck showed no LVO or stenosis or aneurysms. CT perfusion showed no core infarct. IV TPA was not given because LKN was last night, NIHSS 3. Pt was given 2 baby ASA. He was found to have new onset Afib in ED, and upon my evaluation BP was elevated at 156/118.      INTERVAL HPI/ OVERNIGHT EVENTS:   Pt was seen and examined,   OOb to chair, feeling better,  has difficulty walking.  No SOB or CP.  Plan discussed       Vital Signs Last 24 Hrs  T(C): 36.5 (27 Apr 2022 21:42), Max: 36.6 (27 Apr 2022 06:05)  T(F): 97.7 (27 Apr 2022 21:42), Max: 97.9 (27 Apr 2022 06:05)  HR: 65 (27 Apr 2022 21:42) (52 - 70)  BP: 99/57 (27 Apr 2022 21:42) (96/74 - 106/63)  RR: 18 (27 Apr 2022 21:42) (18 - 19)  SpO2: 98% (27 Apr 2022 21:42) (98% - 100%)      REVIEW OF SYSTEMS:  All other review of systems is negative unless indicated above.      PHYSICAL EXAM:  General: Well developed;  in no acute distress  Eyes:  EOMI; conjunctiva and sclera clear  Head: Normocephalic; L temporal area laceration, repaired sutures D/C/I   ENMT: No nasal discharge; airway clear  Neck: Supple; non tender; no masses  Respiratory: No wheezes, rales or rhonchi  Cardiovascular: Irregular rate and rhythm. S1 and S2 Normal;   Gastrointestinal: Soft non-tender non-distended; Normal bowel sounds  Genitourinary: No  suprapubic  tenderness  Extremities: No  edema  Vascular: Peripheral pulses palpable 2+ bilaterally  Neurological: Alert and oriented x3, speech clear, LLE 4+ /5.   Skin: Warm and dry. No acute rash  Lymph Nodes: No acute cervical adenopathy  Musculoskeletal: Normal muscle tone, without deformities  Psychiatric: Cooperative and appropriate      LABS:                           14.5   11.69 )-----------( 250      ( 26 Apr 2022 07:59 )             43.2     04-27    139  |  109<H>  |  24<H>  ----------------------------<  107<H>  4.4   |  26  |  0.90    Ca    9.1      27 Apr 2022 07:04                              14.5   11.69 )-----------( 250      ( 26 Apr 2022 07:59 )             43.2     04-26    141  |  111<H>  |  23  ----------------------------<  107<H>  4.4   |  26  |  0.90    Ca    9.1      26 Apr 2022 07:59                          14.1   10.22 )-----------( 208      ( 25 Apr 2022 07:46 )             42.8     04-25    140  |  107  |  26<H>  ----------------------------<  108<H>  4.2   |  26  |  0.89    Ca    9.1      25 Apr 2022 07:46    TPro  6.7  /  Alb  2.8<L>  /  TBili  0.8  /  DBili  x   /  AST  24  /  ALT  34  /  AlkPhos  108  04-24        LIVER FUNCTIONS - ( 24 Apr 2022 10:50 )  Alb: 2.8 g/dL / Pro: 6.7 gm/dL / ALK PHOS: 108 U/L / ALT: 34 U/L / AST: 24 U/L / GGT: x                                 14.6   10.47 )-----------( 241      ( 24 Apr 2022 10:50 )             42.1     24 Apr 2022 10:50    138    |  105    |  29     ----------------------------<  210    4.1     |  24     |  1.05     Ca    9.3        24 Apr 2022 10:50    TPro  6.7    /  Alb  2.8    /  TBili  0.8    /  DBili  x      /  AST  24     /  ALT  34     /  AlkPhos  108    24 Apr 2022 10:50      LIVER FUNCTIONS - ( 24 Apr 2022 10:50 )  Alb: 2.8 g/dL / Pro: 6.7 gm/dL / ALK PHOS: 108 U/L / ALT: 34 U/L / AST: 24 U/L / GGT: x             MEDICATIONS  (STANDING):  apixaban 5 milliGRAM(s) Oral every 12 hours  atorvastatin 80 milliGRAM(s) Oral at bedtime  chlorhexidine 4% Liquid 1 Application(s) Topical <User Schedule>  digoxin     Tablet 125 MICROGram(s) Oral daily  metoprolol succinate ER 50 milliGRAM(s) Oral two times a day  sacubitril 24 mG/valsartan 26 mG 1 Tablet(s) Oral two times a day  spironolactone 25 milliGRAM(s) Oral <User Schedule>    MEDICATIONS  (PRN):  acetaminophen     Tablet .. 650 milliGRAM(s) Oral every 6 hours PRN Mild Pain (1 - 3), Moderate Pain (4 - 6)    RADIOLOGY & ADDITIONAL TESTS:      ACC: [id number] EXAM:  ECHO TTE WO CON COMP W DOPP                        PROCEDURE DATE:  04/12/2022  >   Summary     Moderate (2+) mitral regurgitation is present.   Normal aortic valve structure and function.   Moderate (2+) tricuspid valve regurgitation is present.   The left ventricle cavity is mildly dilated.   Estimated left ventricular ejection fraction is 15 %.   Severe, diffuse hypokinesis of the left ventricle is present.   The right ventricle exhibits mild diffuse hypokinesis, and mild   depression   of contractility.   Pleural effusion - is present.        ACC: [id number] EXAM:  CT BRAIN                        PROCEDURE DATE:  04/22/2022        FINDINGS:   CT dated 04/17/2022 available for review.  The brain demonstrates evolving hemorrhagic infarction in the RIGHT basal   ganglia with effacement of RIGHT lateral ventricle but no midline shift.   No acute cerebral cortical infarct is seen.    The orbits are unremarkable.  The paranasal sinuses are clear.  The nasal   cavity appears intact.  The nasopharynx is symmetric.  The central skull   base, petrous temporal bones and calvarium remain intact.      IMPRESSION:  evolving hemorrhagic infarction in the RIGHT basal ganglia   with effacement of RIGHT lateral ventricle but no midline shift.

## 2022-04-27 NOTE — PROGRESS NOTE ADULT - SUBJECTIVE AND OBJECTIVE BOX
Cardiology Progress Note    HPI: 62 yr old M with no PMHX was brought to ED by EMS on 22 s/p fall for LE weakness. Patient states at 5:30 am this morning he got up to turn off the TV when his leg gave way and he fell. Pt's sister lives downstairs and heard the fall; she went upstairs and helped him up to a chair, but then pt got up and fell again. Pt's sister was concerned he was having a stroke so she called EMS.    . No CP/SOB. Chart reviewed. No events last pm.   Afib on tele in 60s.     . No events last pm. No new complaints. No CP/SOB.   Afib in 60-70s on tele.     PAST MEDICAL & SURGICAL HISTORY: None  FAMILY HISTORY: mother  at 87 from dementia; Father  at abdelrahman age of 87 from AFIB  Social Hx:  Nonsmoker, no drug or alcohol use      PAST MEDICAL & SURGICAL HISTORY:      MEDICATIONS  (STANDING):  atorvastatin 80 milliGRAM(s) Oral at bedtime  chlorhexidine 4% Liquid 1 Application(s) Topical <User Schedule>  digoxin     Tablet 125 MICROGram(s) Oral daily  lisinopril 2.5 milliGRAM(s) Oral daily  metoprolol succinate ER 50 milliGRAM(s) Oral two times a day  spironolactone 25 milliGRAM(s) Oral daily    MEDICATIONS  (PRN):  acetaminophen     Tablet .. 650 milliGRAM(s) Oral every 6 hours PRN Mild Pain (1 - 3), Moderate Pain (4 - 6)      FAMILY HISTORY:      SOCIAL HISTORY: no recent smoking     REVIEW OF SYSTEMS:  CONSTITUTIONAL:    No fatigue, malaise, lethargy.  No fever or chills.  RESPIRATORY:  No cough.  No wheeze.  No hemoptysis.  No shortness of breath.  CARDIOVASCULAR:  No chest pains.  No palpitations. No shortness of breath, No orthopnea or PND.  GASTROINTESTINAL:  No abdominal pain.  No nausea or vomiting.    GENITOURINARY:    No hematuria.    MUSCULOSKELETAL:  No musculoskeletal pain.  No joint swelling.  No arthritis.  PSYCHIATRIC:  No confusion  SKIN:  No rashes.        ICU Vital Signs Last 24 Hrs  T(C): 36.5 (2022 08:27), Max: 36.9 (2022 09:13)  T(F): 97.7 (2022 08:27), Max: 98.4 (2022 09:13)  HR: 62 (2022 08:27) (57 - 73)  BP: 113/66 (2022 08:27) (103/58 - 126/84)  BP(mean): --  ABP: --  ABP(mean): --  RR: 18 (2022 08:27) (18 - 18)  SpO2: 99% (2022 08:27) (97% - 100%)        PHYSICAL EXAM-    Constitutional: cachectic male in no acute distress   Head: Head is normocephalic and atraumatic.      Neck:  No JVD.     Cardiovascular: irregular rate and rhythm without S3, S4. No murmurs or rubs are appreciated.      Respiratory: Breath sounds are normal. No rales. No wheezing.    Abdomen: Soft, nontender, nondistended with positive bowel sounds.      Extremity: No tenderness. No  pitting edema     Neurologic: The patient is alert and oriented.        INTERPRETATION OF TELEMETRY: afib 80/min , 3.1 sec pause     ECG: afib     I&O's Detail      LABS:                                   14.1   10.22 )-----------( 208      ( 2022 07:46 )             42.8     04-24    138  |  105  |  29<H>  ----------------------------<  210<H>  4.1   |  24  |  1.05    Ca    9.3      2022 10:50    TPro  6.7  /  Alb  2.8<L>  /  TBili  0.8  /  DBili  x   /  AST  24  /  ALT  34  /  AlkPhos  108  04-24        LIVER FUNCTIONS - ( 2022 10:50 )  Alb: 2.8 g/dL / Pro: 6.7 gm/dL / ALK PHOS: 108 U/L / ALT: 34 U/L / AST: 24 U/L / GGT: x             04-12 Chol 149 LDL -- HDL 43 Trig 81      I&O's Summary    BNP  RADIOLOGY & ADDITIONAL STUDIES:  < from: TTE Echo Complete w/o Contrast w/ Doppler (22 @ 09:02) >     Impression     Summary     Moderate (2+) mitral regurgitation is present.   Normal aortic valve structure and function.   Moderate (2+) tricuspid valve regurgitation is present.   The left ventricle cavity is mildly dilated.   Estimated left ventricular ejection fraction is 15 %.   Severe, diffuse hypokinesis of the left ventricle is present.   The right ventricle exhibits mild diffuse hypokinesis, and mild   depression   of contractility.   Pleural effusion - is present.     Signature     ----------------------------------------------------------------   Electronically signed by Anders Marcelino MD(Interpreting   physician) on 2022 10:55 AM   ----------------------------------------------------------------    < end of copied text >

## 2022-04-27 NOTE — PROGRESS NOTE ADULT - NUTRITIONAL ASSESSMENT
This patient has been assessed with a concern for Malnutrition and has been determined to have a diagnosis/diagnoses of Severe protein-calorie malnutrition.    This patient is being managed with:   Diet Regular-  DASH/TLC {Sodium & Cholesterol Restricted} (DASH)  1500mL Fluid Restriction ([id number])  Entered: Apr 12 2022  1:24PM    
This patient has been assessed with a concern for Malnutrition and has been determined to have a diagnosis/diagnoses of Severe protein-calorie malnutrition.    This patient is being managed with:   Diet Regular-  DASH/TLC {Sodium & Cholesterol Restricted} (DASH)  1500mL Fluid Restriction ([id number])  Entered: Apr 12 2022  1:24PM    
This patient has been assessed with a concern for Malnutrition and has been determined to have a diagnosis/diagnoses of Severe protein-calorie malnutrition.    This patient is being managed with:   Diet Regular-  DASH/TLC {Sodium & Cholesterol Restricted} (DASH)  1500mL Fluid Restriction ([id number])  Entered: Apr 12 2022  1:24PM    
This patient has been assessed with a concern for Malnutrition and has been determined to have a diagnosis/diagnoses of Severe protein-calorie malnutrition.    This patient is being managed with:   Diet Regular-  DASH/TLC {Sodium & Cholesterol Restricted} (DASH)  1500mL Fluid Restriction ([id number])  Entered: Apr 12 2022  1:24PM    
This patient has been assessed with a concern for Malnutrition and has been determined to have a diagnosis/diagnoses of Severe protein-calorie malnutrition.    This patient is being managed with:   Diet Regular-  DASH/TLC {Sodium & Cholesterol Restricted} (DASH)  1500mL Fluid Restriction ([id number])  Entered: Apr 12 2022  1:24PM    
This patient has been assessed with a concern for Malnutrition and has been determined to have a diagnosis/diagnoses of Severe protein-calorie malnutrition.    This patient is being managed with:   Diet Regular-  DASH/TLC {Sodium & Cholesterol Restricted} (DASH)  1500mL Fluid Restriction ([id number])  Entered: Apr 12 2022  1:24PM    
This patient has been assessed with a concern for Malnutrition and has been determined to have a diagnosis/diagnoses of Severe protein-calorie malnutrition.    This patient is being managed with:   Diet Regular-  DASH/TLC {Sodium & Cholesterol Restricted} (DASH)  1500mL Fluid Restriction ([id number])  Entered: Apr 12 2022  1:24PM    
This patient has been assessed with a concern for Malnutrition and has been determined to have a diagnosis/diagnoses of Severe protein-calorie malnutrition.    This patient is being managed with:   Diet Regular-  DASH/TLC {Sodium & Cholesterol Restricted} (DASH)  1500mL Fluid Restriction ([id number])  Entered: Apr 12 2022  1:24PM    
This patient has been assessed with a concern for Malnutrition and has been determined to have a diagnosis/diagnoses of Severe protein-calorie malnutrition.    This patient is being managed with:   Diet Regular-  DASH/TLC {Sodium & Cholesterol Restricted} (DASH)  1500mL Fluid Restriction ([id number])  Entered: Apr 12 2022  1:24PM    
This patient has been assessed with a concern for Malnutrition and has been determined to have a diagnosis/diagnoses of Severe protein-calorie malnutrition.    This patient is being managed with:   Diet Regular-  DASH/TLC {Sodium & Cholesterol Restricted} (DASH)  1500mL Fluid Restriction ([id number])  Entered: Apr 12 2022  1:24PM    
This patient has been assessed with a concern for Malnutrition and has been determined to have a diagnosis/diagnoses of Severe protein-calorie malnutrition.    This patient is being managed with:   Diet Regular-  DASH/TLC {Sodium & Cholesterol Restricted} (DASH)  1500mL Fluid Restriction ([id number])  Entered: Apr 12 2022  1:24PM    
This patient has been assessed with a concern for Malnutrition and has been determined to have a diagnosis/diagnoses of Severe protein-calorie malnutrition.    This patient is being managed with:   Diet Regular-  DASH/TLC {Sodium & Cholesterol Restricted} (DASH)  1500mL Fluid Restriction ([id number])  Entered: Apr 12 2022  1:24PM    
This patient has been assessed with a concern for Malnutrition and has been determined to have a diagnosis/diagnoses of Severe protein-calorie malnutrition.    This patient is being managed with:   Diet Regular-  DASH/TLC {Sodium & Cholesterol Restricted} (DASH)  1500mL Fluid Restriction ([id number])  Entered: Apr 12 2022  1:24PM    
This patient has been assessed with a concern for Malnutrition and has been determined to have a diagnosis/diagnoses of Severe protein-calorie malnutrition.    This patient is being managed with:   Diet Regular-  DASH/TLC {Sodium & Cholesterol Restricted} (DASH)  1500mL Fluid Restriction ([id number])  Entered: Apr 12 2022  1:24PM

## 2022-04-27 NOTE — PROGRESS NOTE ADULT - REASON FOR ADMISSION
Left sided weakness

## 2022-04-28 VITALS
DIASTOLIC BLOOD PRESSURE: 75 MMHG | RESPIRATION RATE: 18 BRPM | OXYGEN SATURATION: 100 % | TEMPERATURE: 97 F | HEART RATE: 78 BPM | SYSTOLIC BLOOD PRESSURE: 103 MMHG

## 2022-04-28 PROCEDURE — 99239 HOSP IP/OBS DSCHRG MGMT >30: CPT

## 2022-04-28 RX ORDER — SACUBITRIL AND VALSARTAN 24; 26 MG/1; MG/1
1 TABLET, FILM COATED ORAL
Qty: 60 | Refills: 0
Start: 2022-04-28 | End: 2022-05-27

## 2022-04-28 RX ORDER — METOPROLOL TARTRATE 50 MG
1 TABLET ORAL
Qty: 60 | Refills: 0
Start: 2022-04-28 | End: 2022-05-27

## 2022-04-28 RX ORDER — DIGOXIN 250 MCG
1 TABLET ORAL
Qty: 30 | Refills: 0
Start: 2022-04-28 | End: 2022-05-27

## 2022-04-28 RX ORDER — ATORVASTATIN CALCIUM 80 MG/1
1 TABLET, FILM COATED ORAL
Qty: 30 | Refills: 0
Start: 2022-04-28 | End: 2022-05-27

## 2022-04-28 RX ORDER — APIXABAN 2.5 MG/1
1 TABLET, FILM COATED ORAL
Qty: 60 | Refills: 0
Start: 2022-04-28 | End: 2022-05-27

## 2022-04-28 RX ORDER — SPIRONOLACTONE 25 MG/1
1 TABLET, FILM COATED ORAL
Qty: 30 | Refills: 0
Start: 2022-04-28 | End: 2022-05-27

## 2022-04-28 RX ADMIN — SPIRONOLACTONE 25 MILLIGRAM(S): 25 TABLET, FILM COATED ORAL at 12:59

## 2022-04-28 RX ADMIN — Medication 125 MICROGRAM(S): at 10:27

## 2022-04-28 RX ADMIN — CHLORHEXIDINE GLUCONATE 1 APPLICATION(S): 213 SOLUTION TOPICAL at 10:26

## 2022-04-28 RX ADMIN — SACUBITRIL AND VALSARTAN 1 TABLET(S): 24; 26 TABLET, FILM COATED ORAL at 10:26

## 2022-04-28 RX ADMIN — APIXABAN 5 MILLIGRAM(S): 2.5 TABLET, FILM COATED ORAL at 10:26

## 2022-04-28 RX ADMIN — Medication 50 MILLIGRAM(S): at 10:27

## 2022-04-28 NOTE — PHARMACOTHERAPY INTERVENTION NOTE - COMMENTS
obtained pa for eliquis - approved for $30 copay
recommended redrawing digoxin level
Medication history complete, patient does not take any medication at home.

## 2022-05-02 DIAGNOSIS — R29.703 NIHSS SCORE 3: ICD-10-CM

## 2022-05-02 DIAGNOSIS — I24.8 OTHER FORMS OF ACUTE ISCHEMIC HEART DISEASE: ICD-10-CM

## 2022-05-02 DIAGNOSIS — I42.8 OTHER CARDIOMYOPATHIES: ICD-10-CM

## 2022-05-02 DIAGNOSIS — I50.23 ACUTE ON CHRONIC SYSTOLIC (CONGESTIVE) HEART FAILURE: ICD-10-CM

## 2022-05-02 DIAGNOSIS — S05.42XA PENETRATING WOUND OF ORBIT WITH OR WITHOUT FOREIGN BODY, LEFT EYE, INITIAL ENCOUNTER: ICD-10-CM

## 2022-05-02 DIAGNOSIS — I11.0 HYPERTENSIVE HEART DISEASE WITH HEART FAILURE: ICD-10-CM

## 2022-05-02 DIAGNOSIS — E78.5 HYPERLIPIDEMIA, UNSPECIFIED: ICD-10-CM

## 2022-05-02 DIAGNOSIS — E43 UNSPECIFIED SEVERE PROTEIN-CALORIE MALNUTRITION: ICD-10-CM

## 2022-05-02 DIAGNOSIS — R47.1 DYSARTHRIA AND ANARTHRIA: ICD-10-CM

## 2022-05-02 DIAGNOSIS — I61.9 NONTRAUMATIC INTRACEREBRAL HEMORRHAGE, UNSPECIFIED: ICD-10-CM

## 2022-05-02 DIAGNOSIS — R13.10 DYSPHAGIA, UNSPECIFIED: ICD-10-CM
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
## 2022-05-02 DIAGNOSIS — I63.49 CEREBRAL INFARCTION DUE TO EMBOLISM OF OTHER CEREBRAL ARTERY: ICD-10-CM

## 2022-05-02 DIAGNOSIS — I48.91 UNSPECIFIED ATRIAL FIBRILLATION: ICD-10-CM
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
## 2022-05-09 ENCOUNTER — APPOINTMENT (OUTPATIENT)
Dept: ELECTROPHYSIOLOGY | Facility: CLINIC | Age: 62
End: 2022-05-09
Payer: COMMERCIAL

## 2022-05-09 ENCOUNTER — NON-APPOINTMENT (OUTPATIENT)
Age: 62
End: 2022-05-09

## 2022-05-09 VITALS
RESPIRATION RATE: 14 BRPM | HEIGHT: 71 IN | BODY MASS INDEX: 21 KG/M2 | SYSTOLIC BLOOD PRESSURE: 84 MMHG | HEART RATE: 86 BPM | DIASTOLIC BLOOD PRESSURE: 58 MMHG | WEIGHT: 150 LBS | OXYGEN SATURATION: 100 %

## 2022-05-09 VITALS — DIASTOLIC BLOOD PRESSURE: 68 MMHG | SYSTOLIC BLOOD PRESSURE: 103 MMHG

## 2022-05-09 VITALS — DIASTOLIC BLOOD PRESSURE: 62 MMHG | SYSTOLIC BLOOD PRESSURE: 90 MMHG

## 2022-05-09 PROCEDURE — 93000 ELECTROCARDIOGRAM COMPLETE: CPT

## 2022-05-09 PROCEDURE — 99213 OFFICE O/P EST LOW 20 MIN: CPT

## 2022-05-09 RX ORDER — APIXABAN 5 MG/1
5 TABLET, FILM COATED ORAL
Qty: 60 | Refills: 0 | Status: ACTIVE | COMMUNITY

## 2022-05-09 RX ORDER — ATORVASTATIN CALCIUM 80 MG/1
80 TABLET, FILM COATED ORAL DAILY
Refills: 0 | Status: ACTIVE | COMMUNITY

## 2022-05-10 NOTE — ASSESSMENT
[FreeTextEntry1] : 62 year old male with history of CVA with hemorrhagic conversion, now stable on  Eliquis, Afib currently rate controlled and cardiomyopathy with EF 15% on GDMT, orthostatic hypotension.

## 2022-05-10 NOTE — DISCUSSION/SUMMARY
[FreeTextEntry1] : Afib\par Continue Eliquis, Metoprolol and Digoxin\par Patient will see Dr. Andrews on 6/14 for follow up and scheduling of cardioversion\par \par Cardiomyopathy\par Continue Entresto\par Patient will need re-evaluation of EF after three months of GDMT and if EF is persistently low patient will need AICD\par \par Orthostatic Hypotension\par Patient euvolemic today will decrease Spironolactone to 12.5mg daily\par Advised cautious position changes\par Continue to monitor home BP and call for any persistent hypotension\par Patient will return in two weeks for blood pressure check

## 2022-05-10 NOTE — HISTORY OF PRESENT ILLNESS
[FreeTextEntry1] : 62 year old male recently hospitalized with acute CVA with central hemorrhage, new onset Afib, newly diagnosed cardiomyopathy with EF 15% likely secondary to Afib with RVR.  Patient was cleared for anticoagulation by neuro surgery at time of discharge and has been on Eliquis since 4/27. Patient also started on Entresto and Metoprolol for cardiomyopathy. Patient presents for routine hospital follow up.  Reports that he has been recovering from CVA and strength is improving.  Denies any CP, palpitations, SOB, dizziness, lightheadedness, syncope, edema, orthopnea or PND.

## 2022-05-23 ENCOUNTER — APPOINTMENT (OUTPATIENT)
Dept: ELECTROPHYSIOLOGY | Facility: CLINIC | Age: 62
End: 2022-05-23
Payer: COMMERCIAL

## 2022-05-23 VITALS
HEIGHT: 71 IN | WEIGHT: 157.56 LBS | HEART RATE: 44 BPM | OXYGEN SATURATION: 100 % | BODY MASS INDEX: 22.06 KG/M2 | SYSTOLIC BLOOD PRESSURE: 96 MMHG | DIASTOLIC BLOOD PRESSURE: 60 MMHG

## 2022-05-23 VITALS — SYSTOLIC BLOOD PRESSURE: 87 MMHG | DIASTOLIC BLOOD PRESSURE: 59 MMHG

## 2022-05-23 PROCEDURE — 99211 OFF/OP EST MAY X REQ PHY/QHP: CPT

## 2022-07-25 ENCOUNTER — INPATIENT (INPATIENT)
Facility: HOSPITAL | Age: 62
LOS: 4 days | Discharge: HOME | DRG: 244 | End: 2022-07-30
Attending: INTERNAL MEDICINE | Admitting: INTERNAL MEDICINE
Payer: COMMERCIAL

## 2022-07-25 VITALS
HEIGHT: 71 IN | TEMPERATURE: 98 F | HEART RATE: 70 BPM | SYSTOLIC BLOOD PRESSURE: 94 MMHG | RESPIRATION RATE: 16 BRPM | WEIGHT: 149.91 LBS | DIASTOLIC BLOOD PRESSURE: 62 MMHG | OXYGEN SATURATION: 98 %

## 2022-07-25 DIAGNOSIS — I48.19 OTHER PERSISTENT ATRIAL FIBRILLATION: ICD-10-CM

## 2022-07-25 LAB
ANION GAP SERPL CALC-SCNC: 5 MMOL/L — SIGNIFICANT CHANGE UP (ref 5–17)
BUN SERPL-MCNC: 30 MG/DL — HIGH (ref 7–23)
CALCIUM SERPL-MCNC: 9.3 MG/DL — SIGNIFICANT CHANGE UP (ref 8.5–10.1)
CHLORIDE SERPL-SCNC: 108 MMOL/L — SIGNIFICANT CHANGE UP (ref 96–108)
CO2 SERPL-SCNC: 28 MMOL/L — SIGNIFICANT CHANGE UP (ref 22–31)
CREAT SERPL-MCNC: 1.24 MG/DL — SIGNIFICANT CHANGE UP (ref 0.5–1.3)
EGFR: 65 ML/MIN/1.73M2 — SIGNIFICANT CHANGE UP
GLUCOSE SERPL-MCNC: 113 MG/DL — HIGH (ref 70–99)
HCT VFR BLD CALC: 38.8 % — LOW (ref 39–50)
HGB BLD-MCNC: 13 G/DL — SIGNIFICANT CHANGE UP (ref 13–17)
MAGNESIUM SERPL-MCNC: 2.3 MG/DL — SIGNIFICANT CHANGE UP (ref 1.6–2.6)
MCHC RBC-ENTMCNC: 31.4 PG — SIGNIFICANT CHANGE UP (ref 27–34)
MCHC RBC-ENTMCNC: 33.5 GM/DL — SIGNIFICANT CHANGE UP (ref 32–36)
MCV RBC AUTO: 93.7 FL — SIGNIFICANT CHANGE UP (ref 80–100)
PLATELET # BLD AUTO: 162 K/UL — SIGNIFICANT CHANGE UP (ref 150–400)
POTASSIUM SERPL-MCNC: 4.2 MMOL/L — SIGNIFICANT CHANGE UP (ref 3.5–5.3)
POTASSIUM SERPL-SCNC: 4.2 MMOL/L — SIGNIFICANT CHANGE UP (ref 3.5–5.3)
RBC # BLD: 4.14 M/UL — LOW (ref 4.2–5.8)
RBC # FLD: 13.4 % — SIGNIFICANT CHANGE UP (ref 10.3–14.5)
SODIUM SERPL-SCNC: 141 MMOL/L — SIGNIFICANT CHANGE UP (ref 135–145)
WBC # BLD: 7.72 K/UL — SIGNIFICANT CHANGE UP (ref 3.8–10.5)
WBC # FLD AUTO: 7.72 K/UL — SIGNIFICANT CHANGE UP (ref 3.8–10.5)

## 2022-07-25 PROCEDURE — 92960 CARDIOVERSION ELECTRIC EXT: CPT

## 2022-07-25 PROCEDURE — C1894: CPT

## 2022-07-25 PROCEDURE — 33208 INSRT HEART PM ATRIAL & VENT: CPT | Mod: KX

## 2022-07-25 PROCEDURE — C1889: CPT

## 2022-07-25 PROCEDURE — C1898: CPT

## 2022-07-25 PROCEDURE — C1785: CPT

## 2022-07-25 RX ORDER — SODIUM CHLORIDE 9 MG/ML
500 INJECTION INTRAMUSCULAR; INTRAVENOUS; SUBCUTANEOUS ONCE
Refills: 0 | Status: COMPLETED | OUTPATIENT
Start: 2022-07-25 | End: 2022-07-25

## 2022-07-25 RX ORDER — ASPIRIN/CALCIUM CARB/MAGNESIUM 324 MG
81 TABLET ORAL ONCE
Refills: 0 | Status: COMPLETED | OUTPATIENT
Start: 2022-07-25 | End: 2022-07-26

## 2022-07-25 RX ORDER — ATORVASTATIN CALCIUM 80 MG/1
80 TABLET, FILM COATED ORAL AT BEDTIME
Refills: 0 | Status: DISCONTINUED | OUTPATIENT
Start: 2022-07-25 | End: 2022-07-30

## 2022-07-25 RX ORDER — SACUBITRIL AND VALSARTAN 24; 26 MG/1; MG/1
1 TABLET, FILM COATED ORAL EVERY 12 HOURS
Refills: 0 | Status: DISCONTINUED | OUTPATIENT
Start: 2022-07-25 | End: 2022-07-27

## 2022-07-25 RX ORDER — ACETAMINOPHEN 500 MG
650 TABLET ORAL ONCE
Refills: 0 | Status: DISCONTINUED | OUTPATIENT
Start: 2022-07-25 | End: 2022-07-30

## 2022-07-25 RX ORDER — SODIUM CHLORIDE 9 MG/ML
250 INJECTION INTRAMUSCULAR; INTRAVENOUS; SUBCUTANEOUS ONCE
Refills: 0 | Status: COMPLETED | OUTPATIENT
Start: 2022-07-25 | End: 2022-07-25

## 2022-07-25 RX ORDER — METOPROLOL TARTRATE 50 MG
50 TABLET ORAL
Refills: 0 | Status: DISCONTINUED | OUTPATIENT
Start: 2022-07-25 | End: 2022-07-25

## 2022-07-25 RX ORDER — APIXABAN 2.5 MG/1
5 TABLET, FILM COATED ORAL EVERY 12 HOURS
Refills: 0 | Status: DISCONTINUED | OUTPATIENT
Start: 2022-07-25 | End: 2022-07-28

## 2022-07-25 RX ADMIN — APIXABAN 5 MILLIGRAM(S): 2.5 TABLET, FILM COATED ORAL at 22:15

## 2022-07-25 RX ADMIN — SODIUM CHLORIDE 250 MILLILITER(S): 9 INJECTION INTRAMUSCULAR; INTRAVENOUS; SUBCUTANEOUS at 11:55

## 2022-07-25 RX ADMIN — SODIUM CHLORIDE 500 MILLILITER(S): 9 INJECTION INTRAMUSCULAR; INTRAVENOUS; SUBCUTANEOUS at 23:43

## 2022-07-25 RX ADMIN — ATORVASTATIN CALCIUM 80 MILLIGRAM(S): 80 TABLET, FILM COATED ORAL at 22:16

## 2022-07-25 NOTE — PROCEDURAL SAFETY CHECKLIST WITH OR WITHOUT SEDATION - NSPOSTCOMMENTFT_GEN_ALL_CORE
pt prepped for joey/ cardioversion as per protocol. as per protocol. pt prepped for joey/ cardioversion as per protocol. EF 50% as per Dr. Andrews. cardioversion performed at 200J x1.

## 2022-07-25 NOTE — PACU DISCHARGE NOTE - COMMENTS
pt discharged to 3N to room 325-1, connected to the [facility], report given to Werner AVILA , safety and comfort maintained.

## 2022-07-25 NOTE — H&P CARDIOLOGY - COMMENTS
Patient s/p ALFREDO admitted for Tikosyn loading  Start Tikosyn 250mcg q 12H  Patient will need EKG two hours after each dose for 6 doses to monitor QTc  If QTc >500ms hold Tikosyn dose and contact EP for dose adjustment  Daily BMP and Mag  Keep K>4, Mag >2  ChadsVasc 4, Continue Eliquis uninterrupted S/P ALFREDO/DCCV  Patient bradycardic post DCCV, cannot start Tikosyn at this time  Will monitor overnight to see if rates improve  Metoprolol discontinued  Will follow

## 2022-07-25 NOTE — H&P CARDIOLOGY - HISTORY OF PRESENT ILLNESS
62 year old male with history of HTN, NICM with EF 15%, CVA 4/2022 noted to have persistent Afib.  Patient is currently on Eliquis.  Patient presents today for ALFREDO and admission for Tikosyn loading.  Denies any CP, palpitations, SOB, dizziness, lightheadedness.

## 2022-07-25 NOTE — H&P CARDIOLOGY - NSICDXPASTMEDICALHX_GEN_ALL_CORE_FT
PAST MEDICAL HISTORY:  Atrial fibrillation     Cerebrovascular accident (CVA)     Non-ischemic cardiomyopathy

## 2022-07-25 NOTE — H&P CARDIOLOGY - NS ATTEND AMEND GEN_ALL_CORE FT
pt is s/p joey dccv from afib with significant sinus bradycardia  given glucagon 1mg IV x2,   stop metoprolol  monitor in telemetry if HR improves can dc tomorrow, if significant jessy will need a PPM  cannot give AAD due bradycardia

## 2022-07-25 NOTE — PROCEDURAL SAFETY CHECKLIST WITH OR WITHOUT SEDATION - NSRESOLVEDISCREP_GEN_ALL_CORE
Patient alert and oriented x4. Up with standby. NSR on tele. VSS. Afebrile. Maintaining o2 sats on RA. Denies pain. No c/o n/v. Abx given. Updated patient on POC, verbalized understanding. No needs at this time, will continue to monitor.        Problem: Hari Trammell values  - Obtain nutritional consult as needed  - Evaluate psychosocial factors contributing to over-consumption  Outcome: Progressing     Problem: METABOLIC/FLUID AND ELECTROLYTES - ADULT  Goal: Glucose maintained within prescribed range  Description: INT evaluate response  - Implement non-pharmacological measures as appropriate and evaluate response  - Consider cultural and social influences on pain and pain management  - Manage/alleviate anxiety  - Utilize distraction and/or relaxation techniques  - Monit done

## 2022-07-26 LAB
ANION GAP SERPL CALC-SCNC: 6 MMOL/L — SIGNIFICANT CHANGE UP (ref 5–17)
BUN SERPL-MCNC: 19 MG/DL — SIGNIFICANT CHANGE UP (ref 7–23)
CALCIUM SERPL-MCNC: 8.6 MG/DL — SIGNIFICANT CHANGE UP (ref 8.5–10.1)
CHLORIDE SERPL-SCNC: 108 MMOL/L — SIGNIFICANT CHANGE UP (ref 96–108)
CO2 SERPL-SCNC: 24 MMOL/L — SIGNIFICANT CHANGE UP (ref 22–31)
CREAT SERPL-MCNC: 0.82 MG/DL — SIGNIFICANT CHANGE UP (ref 0.5–1.3)
EGFR: 98 ML/MIN/1.73M2 — SIGNIFICANT CHANGE UP
GLUCOSE SERPL-MCNC: 93 MG/DL — SIGNIFICANT CHANGE UP (ref 70–99)
MAGNESIUM SERPL-MCNC: 2 MG/DL — SIGNIFICANT CHANGE UP (ref 1.6–2.6)
POTASSIUM SERPL-MCNC: 4.1 MMOL/L — SIGNIFICANT CHANGE UP (ref 3.5–5.3)
POTASSIUM SERPL-SCNC: 4.1 MMOL/L — SIGNIFICANT CHANGE UP (ref 3.5–5.3)
SODIUM SERPL-SCNC: 138 MMOL/L — SIGNIFICANT CHANGE UP (ref 135–145)

## 2022-07-26 RX ORDER — DOFETILIDE 0.25 MG/1
250 CAPSULE ORAL EVERY 12 HOURS
Refills: 0 | Status: DISCONTINUED | OUTPATIENT
Start: 2022-07-26 | End: 2022-07-28

## 2022-07-26 RX ORDER — SODIUM CHLORIDE 9 MG/ML
1000 INJECTION INTRAMUSCULAR; INTRAVENOUS; SUBCUTANEOUS ONCE
Refills: 0 | Status: COMPLETED | OUTPATIENT
Start: 2022-07-26 | End: 2022-07-26

## 2022-07-26 RX ORDER — SODIUM CHLORIDE 9 MG/ML
500 INJECTION INTRAMUSCULAR; INTRAVENOUS; SUBCUTANEOUS ONCE
Refills: 0 | Status: COMPLETED | OUTPATIENT
Start: 2022-07-26 | End: 2022-07-26

## 2022-07-26 RX ADMIN — APIXABAN 5 MILLIGRAM(S): 2.5 TABLET, FILM COATED ORAL at 09:28

## 2022-07-26 RX ADMIN — SACUBITRIL AND VALSARTAN 1 TABLET(S): 24; 26 TABLET, FILM COATED ORAL at 09:38

## 2022-07-26 RX ADMIN — SODIUM CHLORIDE 500 MILLILITER(S): 9 INJECTION INTRAMUSCULAR; INTRAVENOUS; SUBCUTANEOUS at 05:28

## 2022-07-26 RX ADMIN — DOFETILIDE 250 MICROGRAM(S): 0.25 CAPSULE ORAL at 11:55

## 2022-07-26 RX ADMIN — APIXABAN 5 MILLIGRAM(S): 2.5 TABLET, FILM COATED ORAL at 22:46

## 2022-07-26 RX ADMIN — ATORVASTATIN CALCIUM 80 MILLIGRAM(S): 80 TABLET, FILM COATED ORAL at 22:46

## 2022-07-26 RX ADMIN — SODIUM CHLORIDE 1000 MILLILITER(S): 9 INJECTION INTRAMUSCULAR; INTRAVENOUS; SUBCUTANEOUS at 22:43

## 2022-07-26 RX ADMIN — DOFETILIDE 250 MICROGRAM(S): 0.25 CAPSULE ORAL at 22:43

## 2022-07-26 RX ADMIN — Medication 81 MILLIGRAM(S): at 09:32

## 2022-07-26 NOTE — PROGRESS NOTE ADULT - ASSESSMENT
Assessment and Plan: 62 year old male with history of HTN, NICM with EF 15%, CVA 4/2022 noted to have persistent Afib.  Patient is currently on Eliquis. Patient with improved EF on recent ALFREDO 50%.  Bradycardia has improved, patient can start antiarrythmic.    Will start Tikosyn 250mcg q12h  Patient will need EKG two hours after each dose for first six doses to monitor QTc  If QTc >500ms hold Tikosyn and contact EP for dose adjustment  Daily BMP and Mag  Keep K>4, Mag >2  Continue Eliquis uninterrupted  Avoid AV halle blockers to prevent further significant bradycardia  Continue GDMT for cardiomyopathy  Plan discussed with patient, RN and Dr. Andrews Assessment and Plan: 62 year old male with history of HTN, NICM with EF 15%, CVA 4/2022 noted to have persistent Afib.  Patient is currently on Eliquis. Patient with improved EF on recent ALFREDO 50%.  Bradycardia has improved, patient can start antiarrythmic.    Will start Tikosyn 250mcg q12h  Patient will need EKG two hours after each dose for first six doses to monitor QTc  If QTc >500ms hold Tikosyn and contact EP for dose adjustment  Daily BMP and Mag  Keep K>4, Mag >2  Continue Eliquis uninterrupted  Avoid AV halle blockers to prevent further significant bradycardia  Avoid QT prolonging medications while on Tikosyn  Continue GDMT for cardiomyopathy  Plan discussed with patient, RN and Dr. Andrews

## 2022-07-26 NOTE — PATIENT PROFILE ADULT - IS THERE A SUSPICION OF ABUSE/NEGLIGENCE?
Patient discharged to home. VSS, no complaints of nausea, vomiting, or pain. PIV was removed. Skin remains intact. Patient sustained no injuries during this hospital stay. RN reviewed discharge instructions and medication list. Patient states that he understands and has no questions at this time. Prescriptions discussed and follow-up appointments to be made by patient with primary doctor and surgeon. Patient left floor via walking escorted by his wife.       no

## 2022-07-26 NOTE — PATIENT PROFILE ADULT - FALL HARM RISK - HARM RISK INTERVENTIONS

## 2022-07-26 NOTE — PROGRESS NOTE ADULT - NS ATTEND AMEND GEN_ALL_CORE FT
s/p joey dccv, lvef improved, sinus jessy, also hypotensive asymptomatic, held CHF medications follow with cardiology   brief ATs on tele, HR 60s bpm, start tikosyn load, cont eliquis  avoid any av node blocking agents

## 2022-07-26 NOTE — CONSULT NOTE ADULT - SUBJECTIVE AND OBJECTIVE BOX
Cardiology Consultation    HPI: 62 year old male with history of HTN, NICM with EF 15%, CVA 4/2022 noted to have persistent Afib.  Patient is currently on Eliquis.  Patient presents today for ALFREDO and admission for Tikosyn loading.  Denies any CP, palpitations, SOB, dizziness, lightheadedness. (25 Jul 2022 08:11)    7/26. Pt s/p cardioversion yesterday. No Tikosyn given with low BP.  Maintaining SR today. SBP in 90s. No dizziness. No CP/SOB.     PAST MEDICAL & SURGICAL HISTORY:  Cerebrovascular accident (CVA)  Atrial fibrillation  Non-ischemic cardiomyopathy    Allergies  No Known Allergies    SOCIAL HISTORY: Denies tobacco, etoh abuse or illicit drug use    FAMILY HISTORY: Noncontributory.     MEDICATIONS  (STANDING):  apixaban 5 milliGRAM(s) Oral every 12 hours  aspirin  chewable 81 milliGRAM(s) Oral Once  atorvastatin 80 milliGRAM(s) Oral at bedtime  sacubitril 24 mG/valsartan 26 mG 1 Tablet(s) Oral every 12 hours    MEDICATIONS  (PRN):  acetaminophen     Tablet .. 650 milliGRAM(s) Oral Once PRN Temp greater or equal to 38C (100.4F), Mild Pain (1 - 3)      Vital Signs Last 24 Hrs  T(C): 36.9 (26 Jul 2022 05:23), Max: 37.4 (25 Jul 2022 23:05)  T(F): 98.4 (26 Jul 2022 05:23), Max: 99.3 (25 Jul 2022 23:05)  HR: 63 (26 Jul 2022 05:23) (41 - 82)  BP: 84/48 (26 Jul 2022 05:23) (79/47 - 120/59)  BP(mean): 72 (25 Jul 2022 09:55) (68 - 72)  RR: 18 (25 Jul 2022 18:56) (15 - 18)  SpO2: 96% (26 Jul 2022 05:23) (95% - 100%)    Parameters below as of 25 Jul 2022 18:56  Patient On (Oxygen Delivery Method): room air        REVIEW OF SYSTEMS:    CONSTITUTIONAL:  As per HPI.  HEENT:  Eyes:  No diplopia or blurred vision. ENT:  No earache, sore throat or runny nose.  CARDIOVASCULAR:  No pressure, squeezing, strangling, tightness, heaviness or aching about the chest, neck, axilla or epigastrium.  RESPIRATORY:  No cough, shortness of breath, PND or orthopnea.  GASTROINTESTINAL:  No nausea, vomiting or diarrhea.  GENITOURINARY:  No dysuria, frequency or urgency.  MUSCULOSKELETAL:  As per HPI.  SKIN:  No change in skin, hair or nails.  NEUROLOGIC:  No paresthesias, fasciculations, seizures or weakness.    PHYSICAL EXAMINATION:    GENERAL APPEARANCE:  Pt. is not currently dyspneic, in no distress. Pt. is alert, oriented, and pleasant.  HEENT:  Pupils are normal and react normally. No icterus. Mucous membranes well colored.  NECK:  Supple. No lymphadenopathy. Jugular venous pressure not elevated. Carotids equal.   HEART:   The cardiac impulse has a normal quality. There are no murmurs, rubs or gallops noted  CHEST:  Chest is clear to auscultation. Normal respiratory effort.  ABDOMEN:  Soft and nontender.   EXTREMITIES:  There is no edema.   SKIN:  No rash or significant lesions are noted.    I&O's Summary      LABS:                        13.0   7.72  )-----------( 162      ( 25 Jul 2022 07:30 )             38.8     07-26    138  |  108  |  19  ----------------------------<  93  4.1   |  24  |  0.82    Ca    8.6      26 Jul 2022 07:35  Mg     2.3     07-25     EKG: < from: 12 Lead ECG (07.25.22 @ 08:52) >  Marked sinus bradycardia with marked sinus arrhythmia  Nonspecific ST abnormality  Abnormal ECG    TELEMETRY: SR    CARDIAC TESTS: < from: ALFREDO Complete w/Spect and Color (07.25.22 @ 08:55) >   Mild to moderate mitral regurgitation is present.   Fibrocalcific changes noted to the mitral valve leaflets with preserved   leaflet excursion.   The aortic valve is well visualized, appears normal. Valve opening seems   to be normal.   Lambl's excrescence is seen in non coronary cusp of the aorta.   Mild pulmonic valvular regurgitation (1+) is present.   The left atrium is dilated.   No thrombus seen in the left atrial appendage. AZUL velocity is low 20   cm/sec.   A saline contrast study was performed and showed no evidence of a patent   foramen ovale.   The left ventricle is normal in size, wall thickness, wall motion, low   normal systolic function LVEF 50%   The right ventricle is normal in size, wall thickness, wall motion, and   contractility.   Small to moderate size Atheromatous plaque is visualized at the proximal   descending aorta    RADIOLOGY & ADDITIONAL STUDIES: pending    ASSESSMENT & PLAN:

## 2022-07-26 NOTE — PROVIDER CONTACT NOTE (OTHER) - ASSESSMENT
pt had 3.47s of PAT in the 150s, assessed pt, asymptomatic exception for low BP. pt had 12.31 of SVT in the 150s, assessed pt, asymptomatic exception for low BP.

## 2022-07-26 NOTE — CONSULT NOTE ADULT - ASSESSMENT
A/P: 62 year old male with history of HTN, NICM with EF 15%, CVA 4/2022 noted to have persistent Afib.  Pt admitted for ALFREDO- now in SR. No Tikosyn started with low BP.    1. ALFREDO s/p CV. Maintaining SR. Cont eliquis for CVA proph.  Hold off on antiarrhythmic for now.    2. Hypotension. Bbl and aldactone held.   Will need to restart low dose toprol xl 25mg daily.   Will try to continue low dose entresto as well.   No diuretics. Follow for now.    3. Nonischemic CMP. LVEF improved significantly to a low normal range of 50-55%.  Pt appears euvolemic. Will continue GDMT as BP allows.  No ICD at this time.    4. DVT proph.

## 2022-07-26 NOTE — PROGRESS NOTE ADULT - SUBJECTIVE AND OBJECTIVE BOX
HPI: 62 year old male with history of HTN, NICM with EF 15%, CVA 4/2022 noted to have persistent Afib.  Patient is currently on Eliquis.  Patient presents today for ALFREDO/DCCV and admission for Tikosyn loading.  Denies any CP, palpitations, SOB, dizziness, lightheadedness.  Post cardioversion patient with significant bradycardia and hypotension.  Metoprolol was discontinued and Glucagon was given x2.  Patient admitted for further observation.  Today patient's heart rate has improved and has been in the 50s with brief bursts of tachycardia.        7/26/22: Patient seen at bedside, feels well, no events noted overnight  TELE:  EKG:    MEDICATIONS  (STANDING):  apixaban 5 milliGRAM(s) Oral every 12 hours  aspirin  chewable 81 milliGRAM(s) Oral Once  atorvastatin 80 milliGRAM(s) Oral at bedtime  sacubitril 24 mG/valsartan 26 mG 1 Tablet(s) Oral every 12 hours    MEDICATIONS  (PRN):  acetaminophen     Tablet .. 650 milliGRAM(s) Oral Once PRN Temp greater or equal to 38C (100.4F), Mild Pain (1 - 3)    Vital Signs Last 24 Hrs  T(C): 36.7 (26 Jul 2022 08:47), Max: 37.4 (25 Jul 2022 23:05)  T(F): 98 (26 Jul 2022 08:47), Max: 99.3 (25 Jul 2022 23:05)  HR: 64 (26 Jul 2022 08:47) (41 - 82)  BP: 94/46 (26 Jul 2022 08:47) (79/47 - 120/59)  BP(mean): 72 (25 Jul 2022 09:55) (68 - 72)  RR: 18 (26 Jul 2022 08:47) (15 - 18)  SpO2: 97% (26 Jul 2022 08:47) (95% - 100%)    Parameters below as of 25 Jul 2022 18:56  Patient On (Oxygen Delivery Method): room air      General: Well-nourished, well-developed, NAD  Head: Normocephalic, Atraumatic  Eyes: PERRLA, EOMI, normal sclera  Throat: Moist mucous membranes  Respiratory: CTAB, normal respiratory effort, no wheezes, crackles, rales  CV: RRR, S1/S2, no murmurs, rubs or gallops  Abdominal: Soft, bowel sounds present, NT, ND +BS  Extremities: No edema, 2+ DP pulses  Neurological: A&Ox4, MAEx4, non-focal  Skin: No rashes    Labs:    07-26    138  |  108  |  19  ----------------------------<  93  4.1   |  24  |  0.82    Ca    8.6      26 Jul 2022 07:35  Mg     2.0     07-26      Cardiology:  < from: ALFREDO Complete w/Spect and Color (07.25.22 @ 08:55) >  Summary     Mild to moderate mitral regurgitation is present.   Fibrocalcific changes noted to the mitral valve leaflets with preserved   leaflet excursion.   The aortic valve is well visualized, appears normal. Valve opening seems   to be normal.   Lambl's excrescence is seen in non coronary cusp of the aorta.   Mild pulmonic valvular regurgitation (1+) is present.   The left atrium is dilated.   No thrombus seen in the left atrial appendage. AZUL velocity is low 20   cm/sec.   A saline contrast study was performed and showed no evidence of a patent   foramen ovale.   The left ventricle is normal in size, wall thickness, wall motion, low   normal systolic function LVEF 50%   The right ventricle is normal in size, wall thickness, wall motion, and   contractility.   Small to moderate size Atheromatous plaque is visualized at the proximal   descending aorta       HPI: 62 year old male with history of HTN, NICM with EF 15%, CVA 2022 noted to have persistent Afib.  Patient is currently on Eliquis.  Patient presents today for ALFREDO/DCCV and admission for Tikosyn loading.  Denies any CP, palpitations, SOB, dizziness, lightheadedness.  Post cardioversion patient with significant bradycardia and hypotension.  Metoprolol was discontinued and Glucagon was given x2.  Patient admitted for further observation.  Today patient's heart rate has improved and has been in the 50s with brief bursts of tachycardia.        22: Patient seen at bedside, feels well, no events noted overnight  TELE: SR 50s, brief bursts of tachycardia up to 140bpm  EK22@09:22  SB@58bpm with APC  Pr: 162ms  QRS: 98ms  QT/QTc: 448/439ms    MEDICATIONS  (STANDING):  apixaban 5 milliGRAM(s) Oral every 12 hours  aspirin  chewable 81 milliGRAM(s) Oral Once  atorvastatin 80 milliGRAM(s) Oral at bedtime  sacubitril 24 mG/valsartan 26 mG 1 Tablet(s) Oral every 12 hours    MEDICATIONS  (PRN):  acetaminophen     Tablet .. 650 milliGRAM(s) Oral Once PRN Temp greater or equal to 38C (100.4F), Mild Pain (1 - 3)    Vital Signs Last 24 Hrs  T(C): 36.7 (2022 08:47), Max: 37.4 (2022 23:05)  T(F): 98 (2022 08:47), Max: 99.3 (2022 23:05)  HR: 64 (2022 08:47) (41 - 82)  BP: 94/46 (2022 08:47) (79/47 - 120/59)  BP(mean): 72 (2022 09:55) (68 - 72)  RR: 18 (2022 08:47) (15 - 18)  SpO2: 97% (2022 08:47) (95% - 100%)    Parameters below as of 2022 18:56  Patient On (Oxygen Delivery Method): room air      General: Well-nourished, well-developed, NAD  Head: Normocephalic, Atraumatic  Eyes: PERRLA, EOMI, normal sclera  Throat: Moist mucous membranes  Respiratory: CTAB, normal respiratory effort, no wheezes, crackles, rales  CV: RRR, S1/S2, no murmurs, rubs or gallops  Abdominal: Soft, bowel sounds present, NT, ND +BS  Extremities: No edema, 2+ DP pulses  Neurological: A&Ox4, MAEx4, non-focal  Skin: No rashes    Labs:        138  |  108  |  19  ----------------------------<  93  4.1   |  24  |  0.82    Ca    8.6      2022 07:35  Mg     2.0           Cardiology:  < from: ALFREDO Complete w/Spect and Color (22 @ 08:55) >  Summary     Mild to moderate mitral regurgitation is present.   Fibrocalcific changes noted to the mitral valve leaflets with preserved   leaflet excursion.   The aortic valve is well visualized, appears normal. Valve opening seems   to be normal.   Lambl's excrescence is seen in non coronary cusp of the aorta.   Mild pulmonic valvular regurgitation (1+) is present.   The left atrium is dilated.   No thrombus seen in the left atrial appendage. AZUL velocity is low 20   cm/sec.   A saline contrast study was performed and showed no evidence of a patent   foramen ovale.   The left ventricle is normal in size, wall thickness, wall motion, low   normal systolic function LVEF 50%   The right ventricle is normal in size, wall thickness, wall motion, and   contractility.   Small to moderate size Atheromatous plaque is visualized at the proximal   descending aorta

## 2022-07-27 LAB
ANION GAP SERPL CALC-SCNC: 4 MMOL/L — LOW (ref 5–17)
BUN SERPL-MCNC: 18 MG/DL — SIGNIFICANT CHANGE UP (ref 7–23)
CALCIUM SERPL-MCNC: 8.4 MG/DL — LOW (ref 8.5–10.1)
CHLORIDE SERPL-SCNC: 110 MMOL/L — HIGH (ref 96–108)
CO2 SERPL-SCNC: 25 MMOL/L — SIGNIFICANT CHANGE UP (ref 22–31)
CREAT SERPL-MCNC: 0.86 MG/DL — SIGNIFICANT CHANGE UP (ref 0.5–1.3)
EGFR: 97 ML/MIN/1.73M2 — SIGNIFICANT CHANGE UP
GLUCOSE SERPL-MCNC: 118 MG/DL — HIGH (ref 70–99)
MAGNESIUM SERPL-MCNC: 2 MG/DL — SIGNIFICANT CHANGE UP (ref 1.6–2.6)
POTASSIUM SERPL-MCNC: 4.1 MMOL/L — SIGNIFICANT CHANGE UP (ref 3.5–5.3)
POTASSIUM SERPL-SCNC: 4.1 MMOL/L — SIGNIFICANT CHANGE UP (ref 3.5–5.3)
SODIUM SERPL-SCNC: 139 MMOL/L — SIGNIFICANT CHANGE UP (ref 135–145)

## 2022-07-27 RX ORDER — METOPROLOL TARTRATE 50 MG
25 TABLET ORAL DAILY
Refills: 0 | Status: DISCONTINUED | OUTPATIENT
Start: 2022-07-27 | End: 2022-07-27

## 2022-07-27 RX ADMIN — APIXABAN 5 MILLIGRAM(S): 2.5 TABLET, FILM COATED ORAL at 21:53

## 2022-07-27 RX ADMIN — DOFETILIDE 250 MICROGRAM(S): 0.25 CAPSULE ORAL at 10:31

## 2022-07-27 RX ADMIN — ATORVASTATIN CALCIUM 80 MILLIGRAM(S): 80 TABLET, FILM COATED ORAL at 21:53

## 2022-07-27 RX ADMIN — APIXABAN 5 MILLIGRAM(S): 2.5 TABLET, FILM COATED ORAL at 10:31

## 2022-07-27 RX ADMIN — DOFETILIDE 250 MICROGRAM(S): 0.25 CAPSULE ORAL at 21:52

## 2022-07-27 NOTE — PROGRESS NOTE ADULT - SUBJECTIVE AND OBJECTIVE BOX
Cardiology Progress Note    HPI: 62 year old male with history of HTN, NICM with EF 15%, CVA 4/2022 noted to have persistent Afib.  Patient is currently on Eliquis.  Patient presents today for ALFREDO and admission for Tikosyn loading.  Denies any CP, palpitations, SOB, dizziness, lightheadedness. (25 Jul 2022 08:11)    7/26. Pt s/p cardioversion yesterday. No Tikosyn given with low BP.  Maintaining SR today. SBP in 90s. No dizziness. No CP/SOB.     7/27. SBP remains in 90s. No dizziness. No CP/SOB. SR on tele.   No events last pm.     PAST MEDICAL & SURGICAL HISTORY:  Cerebrovascular accident (CVA)  Atrial fibrillation  Non-ischemic cardiomyopathy    Allergies  No Known Allergies    SOCIAL HISTORY: Denies tobacco, etoh abuse or illicit drug use    FAMILY HISTORY: Noncontributory.     MEDICATIONS  (STANDING):  apixaban 5 milliGRAM(s) Oral every 12 hours  aspirin  chewable 81 milliGRAM(s) Oral Once  atorvastatin 80 milliGRAM(s) Oral at bedtime  sacubitril 24 mG/valsartan 26 mG 1 Tablet(s) Oral every 12 hours    MEDICATIONS  (PRN):  acetaminophen     Tablet .. 650 milliGRAM(s) Oral Once PRN Temp greater or equal to 38C (100.4F), Mild Pain (1 - 3)      Vital Signs Last 24 Hrs  T(C): 36.9 (26 Jul 2022 05:23), Max: 37.4 (25 Jul 2022 23:05)  T(F): 98.4 (26 Jul 2022 05:23), Max: 99.3 (25 Jul 2022 23:05)  HR: 63 (26 Jul 2022 05:23) (41 - 82)  BP: 84/48 (26 Jul 2022 05:23) (79/47 - 120/59)  BP(mean): 72 (25 Jul 2022 09:55) (68 - 72)  RR: 18 (25 Jul 2022 18:56) (15 - 18)  SpO2: 96% (26 Jul 2022 05:23) (95% - 100%)    Parameters below as of 25 Jul 2022 18:56  Patient On (Oxygen Delivery Method): room air        REVIEW OF SYSTEMS:    CONSTITUTIONAL:  As per HPI.  HEENT:  Eyes:  No diplopia or blurred vision. ENT:  No earache, sore throat or runny nose.  CARDIOVASCULAR:  No pressure, squeezing, strangling, tightness, heaviness or aching about the chest, neck, axilla or epigastrium.  RESPIRATORY:  No cough, shortness of breath, PND or orthopnea.  GASTROINTESTINAL:  No nausea, vomiting or diarrhea.  GENITOURINARY:  No dysuria, frequency or urgency.  MUSCULOSKELETAL:  As per HPI.  SKIN:  No change in skin, hair or nails.  NEUROLOGIC:  No paresthesias, fasciculations, seizures or weakness.    PHYSICAL EXAMINATION:    GENERAL APPEARANCE:  Pt. is not currently dyspneic, in no distress. Pt. is alert, oriented, and pleasant.  HEENT:  Pupils are normal and react normally. No icterus. Mucous membranes well colored.  NECK:  Supple. No lymphadenopathy. Jugular venous pressure not elevated. Carotids equal.   HEART:   The cardiac impulse has a normal quality. There are no murmurs, rubs or gallops noted  CHEST:  Chest is clear to auscultation. Normal respiratory effort.  ABDOMEN:  Soft and nontender.   EXTREMITIES:  There is no edema.   SKIN:  No rash or significant lesions are noted.    I&O's Summary      LABS:                        13.0   7.72  )-----------( 162      ( 25 Jul 2022 07:30 )             38.8     07-26    138  |  108  |  19  ----------------------------<  93  4.1   |  24  |  0.82    Ca    8.6      26 Jul 2022 07:35  Mg     2.3     07-25     EKG: < from: 12 Lead ECG (07.25.22 @ 08:52) >  Marked sinus bradycardia with marked sinus arrhythmia  Nonspecific ST abnormality  Abnormal ECG    TELEMETRY: SR    CARDIAC TESTS: < from: ALFREDO Complete w/Spect and Color (07.25.22 @ 08:55) >   Mild to moderate mitral regurgitation is present.   Fibrocalcific changes noted to the mitral valve leaflets with preserved   leaflet excursion.   The aortic valve is well visualized, appears normal. Valve opening seems   to be normal.   Lambl's excrescence is seen in non coronary cusp of the aorta.   Mild pulmonic valvular regurgitation (1+) is present.   The left atrium is dilated.   No thrombus seen in the left atrial appendage. AZUL velocity is low 20   cm/sec.   A saline contrast study was performed and showed no evidence of a patent   foramen ovale.   The left ventricle is normal in size, wall thickness, wall motion, low   normal systolic function LVEF 50%   The right ventricle is normal in size, wall thickness, wall motion, and   contractility.   Small to moderate size Atheromatous plaque is visualized at the proximal   descending aorta    RADIOLOGY & ADDITIONAL STUDIES: pending    ASSESSMENT & PLAN:

## 2022-07-27 NOTE — PROGRESS NOTE ADULT - ASSESSMENT
Assessment and Plan: 62 year old male with history of HTN, NICM with EF 15%, CVA 4/2022 noted to have persistent Afib.  Patient is currently on Eliquis. Patient with improved EF on recent ALFREDO 50%.  Bradycardia has improved, patient can start antiarrythmic.    Will start Tikosyn 250mcg q12h  Patient will need EKG two hours after each dose for first six doses to monitor QTc  If QTc >500ms hold Tikosyn and contact EP for dose adjustment  Daily BMP and Mag  Keep K>4, Mag >2  Continue Eliquis uninterrupted  Avoid AV halle blockers to prevent further significant bradycardia  Avoid QT prolonging medications while on Tikosyn  Continue GDMT for cardiomyopathy  Plan discussed with patient, RN and Dr. Andrews Assessment and Plan: 62 year old male with history of HTN, NICM with EF 15%, CVA 4/2022 noted to have persistent Afib.  Patient is currently on Eliquis. Patient with improved EF on recent ALFREDO 50%.  Bradycardia has improved, patient can start antiarrythmic.    Continue s Tikosyn 250mcg q12h  Patient will need EKG two hours after each dose for first six doses to monitor QTc, patient has received 2/6 doses  If QTc >500ms hold Tikosyn and contact EP for dose adjustment  Daily BMP and Mag  Keep K>4, Mag >2  Continue Eliquis uninterrupted  Avoid QT prolonging medications while on Tikosyn  Entresto discontinued by cardiology and started on Toprol 25mg daily  Toprol discontinued prior to patient receiving a dose, no further AV halle blockers due to baseline bradycardia  Consider ARB for heart failure therapy  Plan discussed with patient, RN and Dr. Andrews

## 2022-07-27 NOTE — PROVIDER CONTACT NOTE (OTHER) - ASSESSMENT
Patient A&O4, denies complaints of chest pain, patient Patient A&O4, denies complaints of chest pain

## 2022-07-27 NOTE — PROGRESS NOTE ADULT - SUBJECTIVE AND OBJECTIVE BOX
HPI: 62 year old male with history of HTN, NICM with EF 15%, CVA 4/2022 noted to have persistent Afib.  Patient is currently on Eliquis.  Patient presents today for ALFREDO/DCCV and admission for Tikosyn loading.  Denies any CP, palpitations, SOB, dizziness, lightheadedness.  Post cardioversion patient with significant bradycardia and hypotension.  Metoprolol was discontinued and Glucagon was given x2.  Patient admitted for further observation.  Today patient's heart rate has improved and has been in the 50s with brief bursts of tachycardia.      7/27/22: Patient seen at bedside, denies any CP, palpitations, SOB, dizziness, lightheadedness  MEDICATIONS  (STANDING):  apixaban 5 milliGRAM(s) Oral every 12 hours  aspirin  chewable 81 milliGRAM(s) Oral Once  atorvastatin 80 milliGRAM(s) Oral at bedtime  sacubitril 24 mG/valsartan 26 mG 1 Tablet(s) Oral every 12 hours    MEDICATIONS  (PRN):  acetaminophen     Tablet .. 650 milliGRAM(s) Oral Once PRN Temp greater or equal to 38C (100.4F), Mild Pain (1 - 3)    Vital Signs Last 24 Hrs  T(C): 36.7 (26 Jul 2022 08:47), Max: 37.4 (25 Jul 2022 23:05)  T(F): 98 (26 Jul 2022 08:47), Max: 99.3 (25 Jul 2022 23:05)  HR: 64 (26 Jul 2022 08:47) (41 - 82)  BP: 94/46 (26 Jul 2022 08:47) (79/47 - 120/59)  BP(mean): 72 (25 Jul 2022 09:55) (68 - 72)  RR: 18 (26 Jul 2022 08:47) (15 - 18)  SpO2: 97% (26 Jul 2022 08:47) (95% - 100%)    Parameters below as of 25 Jul 2022 18:56  Patient On (Oxygen Delivery Method): room air      General: Well-nourished, well-developed, NAD  Head: Normocephalic, Atraumatic  Eyes: PERRLA, EOMI, normal sclera  Throat: Moist mucous membranes  Respiratory: CTAB, normal respiratory effort, no wheezes, crackles, rales  CV: RRR, S1/S2, no murmurs, rubs or gallops  Abdominal: Soft, bowel sounds present, NT, ND +BS  Extremities: No edema, 2+ DP pulses  Neurological: A&Ox4, MAEx4, non-focal  Skin: No rashes    Labs:    07-26    138  |  108  |  19  ----------------------------<  93  4.1   |  24  |  0.82    Ca    8.6      26 Jul 2022 07:35  Mg     2.0     07-26      Cardiology:  < from: ALFREDO Complete w/Spect and Color (07.25.22 @ 08:55) >  Summary     Mild to moderate mitral regurgitation is present.   Fibrocalcific changes noted to the mitral valve leaflets with preserved   leaflet excursion.   The aortic valve is well visualized, appears normal. Valve opening seems   to be normal.   Lambl's excrescence is seen in non coronary cusp of the aorta.   Mild pulmonic valvular regurgitation (1+) is present.   The left atrium is dilated.   No thrombus seen in the left atrial appendage. AZUL velocity is low 20   cm/sec.   A saline contrast study was performed and showed no evidence of a patent   foramen ovale.   The left ventricle is normal in size, wall thickness, wall motion, low   normal systolic function LVEF 50%   The right ventricle is normal in size, wall thickness, wall motion, and   contractility.   Small to moderate size Atheromatous plaque is visualized at the proximal   descending aorta       HPI: 62 year old male with history of HTN, NICM with EF 15%, CVA 2022 noted to have persistent Afib.  Patient is currently on Eliquis.  Patient presents today for ALFREDO/DCCV and admission for Tikosyn loading.  Denies any CP, palpitations, SOB, dizziness, lightheadedness.  Post cardioversion patient with significant bradycardia and hypotension.  Metoprolol was discontinued and Glucagon was given x2.  Patient admitted for further observation.  Today patient's heart rate has improved and has been in the 50s with brief bursts of tachycardia.      22: Patient seen at bedside, denies any CP, palpitations, SOB, dizziness, lightheadedness.    TELE:  SR with rates 35-70bpm, ambulated with patient and HR increased to 60-70, some pauses approximately 2 seconds and occasional junctional beat  EK22@00:00  NSR@60bpm  VT: 152ms  QRS: 106ms  QT/QTc: 458/458ms    MEDICATIONS  (STANDING):  apixaban 5 milliGRAM(s) Oral every 12 hours  atorvastatin 80 milliGRAM(s) Oral at bedtime  dofetilide 250 MICROGram(s) Oral every 12 hours    MEDICATIONS  (PRN):  acetaminophen     Tablet .. 650 milliGRAM(s) Oral Once PRN Temp greater or equal to 38C (100.4F), Mild Pain (1 - 3)      Vital Signs Last 24 Hrs  T(C): 36.3 (2022 08:43), Max: 37 (2022 01:05)  T(F): 97.3 (2022 08:43), Max: 98.6 (2022 01:05)  HR: 55 (2022 08:43) (50 - 59)  BP: 93/71 (2022 08:43) (89/48 - 105/60)  BP(mean): --  RR: 18 (2022 08:43) (16 - 18)  SpO2: 100% (2022 08:43) (97% - 100%)    Parameters below as of 2022 01:05  Patient On (Oxygen Delivery Method): room air        General: Well-nourished, well-developed, NAD  Head: Normocephalic, Atraumatic  Eyes: PERRLA, EOMI, normal sclera  Throat: Moist mucous membranes  Respiratory: CTAB, normal respiratory effort, no wheezes, crackles, rales  CV: RRR, S1/S2, no murmurs, rubs or gallops  Abdominal: Soft, bowel sounds present, NT, ND +BS  Extremities: No edema, 2+ DP pulses  Neurological: A&Ox4, MAEx4, non-focal  Skin: No rashes    Labs:      139  |  110<H>  |  18  ----------------------------<  118<H>  4.1   |  25  |  0.86    Ca    8.4<L>      2022 06:47  Mg     2.0             Cardiology:  < from: ALFREDO Complete w/Spect and Color (22 @ 08:55) >  Summary     Mild to moderate mitral regurgitation is present.   Fibrocalcific changes noted to the mitral valve leaflets with preserved   leaflet excursion.   The aortic valve is well visualized, appears normal. Valve opening seems   to be normal.   Lambl's excrescence is seen in non coronary cusp of the aorta.   Mild pulmonic valvular regurgitation (1+) is present.   The left atrium is dilated.   No thrombus seen in the left atrial appendage. AZUL velocity is low 20   cm/sec.   A saline contrast study was performed and showed no evidence of a patent   foramen ovale.   The left ventricle is normal in size, wall thickness, wall motion, low   normal systolic function LVEF 50%   The right ventricle is normal in size, wall thickness, wall motion, and   contractility.   Small to moderate size Atheromatous plaque is visualized at the proximal   descending aorta

## 2022-07-27 NOTE — PROGRESS NOTE ADULT - ASSESSMENT
A/P: 62 year old male with history of HTN, NICM with EF 15%, CVA 4/2022 noted to have persistent Afib.  Pt admitted for ALFREDO- now in SR. No Tikosyn started with low BP.    1. ALFREDO s/p CV. Maintaining SR. Cont eliquis for CVA proph.  Hold off on antiarrhythmic for now.    2. Hypotension. Unable to tolerate entresto with ongoing hypotension.   WIll d/c and instead start toprol xl 25mg daily.   Further changes can be made as outpt.     3. Nonischemic CMP. LVEF improved significantly to a low normal range of 50-55%.  Pt appears euvolemic. Will continue GDMT as BP allows.  No ICD at this time.    4. DVT proph. D/C planning.

## 2022-07-27 NOTE — PROGRESS NOTE ADULT - NS ATTEND AMEND GEN_ALL_CORE FT
s/p joey dccv, lvef improved, sinus jessy, also hypotensive asymptomatic, held CHF medications follow with cardiology   brief ATs on tele, HR 60s bpm, start tikosyn load, cont eliquis  avoid any av node blocking agents s/p joey dccv, lvef improved, sinus jessy, also hypotensive asymptomatic, held CHF medications follow with cardiology   brief ATs on tele, HR 60s bpm, start tikosyn load, cont eliquis  avoid any av node blocking agents  asymptomatic SND, if he has significant sustained bradycardia episodes may need a ppm  will need mcot on discharge

## 2022-07-27 NOTE — PROVIDER CONTACT NOTE (OTHER) - BACKGROUND
Admit dx. Afib Admit dx. Afib. Previously on Entresto, this AM d/c and changed to metoprolol 25 mg daily

## 2022-07-28 ENCOUNTER — RESULT REVIEW (OUTPATIENT)
Age: 62
End: 2022-07-28

## 2022-07-28 LAB
ANION GAP SERPL CALC-SCNC: 4 MMOL/L — LOW (ref 5–17)
BUN SERPL-MCNC: 16 MG/DL — SIGNIFICANT CHANGE UP (ref 7–23)
CALCIUM SERPL-MCNC: 8.7 MG/DL — SIGNIFICANT CHANGE UP (ref 8.5–10.1)
CHLORIDE SERPL-SCNC: 111 MMOL/L — HIGH (ref 96–108)
CO2 SERPL-SCNC: 27 MMOL/L — SIGNIFICANT CHANGE UP (ref 22–31)
CREAT SERPL-MCNC: 0.83 MG/DL — SIGNIFICANT CHANGE UP (ref 0.5–1.3)
EGFR: 98 ML/MIN/1.73M2 — SIGNIFICANT CHANGE UP
GLUCOSE SERPL-MCNC: 95 MG/DL — SIGNIFICANT CHANGE UP (ref 70–99)
MAGNESIUM SERPL-MCNC: 2.1 MG/DL — SIGNIFICANT CHANGE UP (ref 1.6–2.6)
POTASSIUM SERPL-MCNC: 3.9 MMOL/L — SIGNIFICANT CHANGE UP (ref 3.5–5.3)
POTASSIUM SERPL-SCNC: 3.9 MMOL/L — SIGNIFICANT CHANGE UP (ref 3.5–5.3)
SARS-COV-2 RNA SPEC QL NAA+PROBE: SIGNIFICANT CHANGE UP
SODIUM SERPL-SCNC: 142 MMOL/L — SIGNIFICANT CHANGE UP (ref 135–145)

## 2022-07-28 PROCEDURE — 78472 GATED HEART PLANAR SINGLE: CPT | Mod: 26,ME

## 2022-07-28 PROCEDURE — G1004: CPT

## 2022-07-28 RX ORDER — DOFETILIDE 0.25 MG/1
250 CAPSULE ORAL EVERY 12 HOURS
Refills: 0 | Status: COMPLETED | OUTPATIENT
Start: 2022-07-28 | End: 2022-07-28

## 2022-07-28 RX ORDER — DOFETILIDE 0.25 MG/1
250 CAPSULE ORAL EVERY 12 HOURS
Refills: 0 | Status: DISCONTINUED | OUTPATIENT
Start: 2022-07-29 | End: 2022-07-30

## 2022-07-28 RX ORDER — POTASSIUM CHLORIDE 20 MEQ
20 PACKET (EA) ORAL
Refills: 0 | Status: COMPLETED | OUTPATIENT
Start: 2022-07-28 | End: 2022-07-28

## 2022-07-28 RX ADMIN — Medication 20 MILLIEQUIVALENT(S): at 18:12

## 2022-07-28 RX ADMIN — ATORVASTATIN CALCIUM 80 MILLIGRAM(S): 80 TABLET, FILM COATED ORAL at 22:25

## 2022-07-28 RX ADMIN — DOFETILIDE 250 MICROGRAM(S): 0.25 CAPSULE ORAL at 22:25

## 2022-07-28 RX ADMIN — DOFETILIDE 250 MICROGRAM(S): 0.25 CAPSULE ORAL at 11:25

## 2022-07-28 RX ADMIN — Medication 20 MILLIEQUIVALENT(S): at 11:08

## 2022-07-28 NOTE — PROVIDER CONTACT NOTE (OTHER) - ASSESSMENT
Stable, asymptomatic, offers no complaints, remains SB on tele, QTC WNL  Presents with HR 30-50  Prior tele events noted

## 2022-07-28 NOTE — PROGRESS NOTE ADULT - ASSESSMENT
A/P: 62 year old male with history of HTN, NICM with EF 15%, CVA 4/2022 noted to have persistent Afib.  Pt admitted for ALFREDO- now in SR. No Tikosyn started with low BP.    1. ALFREDO s/p CV. Maintaining SR. Cont eliquis for CVA proph.  continue Tikosyn , can go home after 6th dose, probably in AM   2. con eliquis     3. Nonischemic CMP. LVEF improved significantly to a low normal range of 50-55%.  Pt appears euvolemic. cannot tolerate BBlockers bc of bradycardia or ACEI bc of hypotension

## 2022-07-28 NOTE — PROGRESS NOTE ADULT - SUBJECTIVE AND OBJECTIVE BOX
HPI: 62 year old male with history of HTN, NICM with EF 15%, CVA 2022 noted to have persistent Afib.  Patient is currently on Eliquis.  Patient presents today for ALFREDO/DCCV and admission for Tikosyn loading.  Denies any CP, palpitations, SOB, dizziness, lightheadedness.  Post cardioversion patient with significant bradycardia and hypotension.  Metoprolol was discontinued and Glucagon was given x2.  Patient admitted for further observation.  Today patient's heart rate has improved and has been in the 50s with brief bursts of tachycardia.      22: Patient seen at bedside, denies any CP, palpitations, SOB, dizziness, lightheadedness.    TELE:  SR with rates 35-70bpm, ambulated with patient and HR increased to 60-70, some pauses approximately 2 seconds and occasional junctional beat  EK22@00:00  NSR@60bpm  SC: 152ms  QRS: 106ms  QT/QTc: 458/458ms    Subjective: pt evaluated at bedside, sitting up comfortably denies any symptoms or complaints of discomfort.      EKG:   TELE: sinus bradycardia average rates in low 40s, occasionally in 30s, brief episodes of pauses 2.8-2.9sec in sinus jessy, episodes of junctional rhythm     MEDICATIONS  (STANDING):  atorvastatin 80 milliGRAM(s) Oral at bedtime  potassium chloride    Tablet ER 20 milliEquivalent(s) Oral every 2 hours    MEDICATIONS  (PRN):  acetaminophen     Tablet .. 650 milliGRAM(s) Oral Once PRN Temp greater or equal to 38C (100.4F), Mild Pain (1 - 3)      Allergies    No Known Allergies      Vital Signs Last 24 Hrs  T(C): 36.4 (2022 08:53), Max: 36.7 (2022 20:46)  T(F): 97.5 (2022 08:53), Max: 98 (2022 20:46)  HR: 77 (2022 08:53) (46 - 87)  BP: 102/63 (2022 08:53) (101/56 - 103/55)  BP(mean): --  RR: 18 (2022 08:53) (18 - 18)  SpO2: 100% (2022 08:53) (100% - 100%)    Parameters below as of 2022 06:32  Patient On (Oxygen Delivery Method): room air        PHYSICAL EXAMINATION:  GENERAL: In no apparent distress, well nourished, and hydrated.  HEAD:  Atraumatic, Normocephalic  EYES: EOMI, PERRLA, conjunctiva and sclera clear  ENMT: No tonsillar erythema, exudates, or enlargement; Moist mucous membranes, Good dentition, No lesions  NECK: Supple and normal thyroid.  No JVD or carotid bruit.  Carotid pulse is 2+ bilaterally.  HEART: bradycardic; No murmurs, rubs, or gallops.  PULMONARY:  Left base cracles, No wheezing, no rhonchi bilaterally.  ABDOMEN: Soft, Nontender, Nondistended; Bowel sounds present  EXTREMITIES:  2+ Peripheral Pulses, No clubbing, cyanosis, or edema  LYMPH: No lymphadenopathy noted  NEUROLOGICAL: Grossly nonfocal    LABS:        142  |  111<H>  |  16  ----------------------------<  95  3.9   |  27  |  0.83    Ca    8.7      2022 08:54  Mg     2.1                     RADIOLOGY & ADDITIONAL TESTS:    TTE 22     Summary     Moderate (2+) mitral regurgitation is present.   Normal aortic valve structure and function.   Moderate (2+) tricuspid valve regurgitation is present.   The left ventricle cavity is mildly dilated.   Estimated left ventricular ejection fraction is 15 %.   Severe, diffuse hypokinesis of the left ventricle is present.   The right ventricle exhibits mild diffuse hypokinesis, and mild   depression   of contractility.   Pleural effusion - is present.     Signature     ----------------------------------------------------------------   Electronically signed by Anders Marcelino MD(Interpreting   physician) on 2022 10:55 AM   ----------------------------------------------------------------        ALFREDO 7/25/22   Mild to moderate mitral regurgitation is present.   Fibrocalcific changes noted to the mitral valve leaflets with preserved   leaflet excursion.   The aortic valve is well visualized, appears normal. Valve opening seems   to be normal.   Lambl's excrescence is seen in non coronary cusp of the aorta.   Mild pulmonic valvular regurgitation (1+) is present.   The left atrium is dilated.   No thrombus seen in the left atrial appendage. AZUL velocity is low 20   cm/sec.   A saline contrast study was performed and showed no evidence of a patent   foramen ovale.   The left ventricle is normal in size, wall thickness, wall motion, low   normal systolic function LVEF 50%   The right ventricle is normal in size, wall thickness, wall motion, and   contractility.   Small to moderate size Atheromatous plaque is visualized at the proximal   descending aorta     Signature     ----------------------------------------------------------------   Electronically signed by Yesenia Andrews MD(Interpreting   physician) on 2022 09:31 AM   ----------------------------------------------------------------

## 2022-07-28 NOTE — PROGRESS NOTE ADULT - ASSESSMENT
61yo male with PMH CVA, Afib on eliquis, NICM with EF 15% first diagnosed in april 2022 (during hospitalization for CVA). presented for elective cardioversion and antiarrhythmic loading.  LVEF improved this admission .  post cardioversion with severe bradycardia, bb stopped. telemetry over last 48hrs with persistent sinus bradycardia, heart rate in 40s with occasional pauses 2.9sec and episodes of junctional rhythm, asymptomatic thus far, likely to be Sinus node disease.    Sinus Node Disease /  PAF  will recommend permanent pacemaker implant  - discussed with patient and his sister and they are in agreemetn, plan for tomorrow  hold eliquis for ppm implant  will get MUGA scan for accurate evaluation of LVEF pre-ppm implant   will continue Tikosyn at 250mcg q12h  monitor telemetry closely  Patient will need EKG two hours after each dose for first six doses to monitor QTc  If QTc >500ms hold Tikosyn and contact EP for dose adjustment  Daily BMP and Mag  Keep K>4, Mag >2  NPO after midnight

## 2022-07-28 NOTE — PROGRESS NOTE ADULT - SUBJECTIVE AND OBJECTIVE BOX
Patient is a 62y old  Male who presents with a chief complaint of ALFREDO/DCCV, Tikosyn loading (27 Jul 2022 10:43)    7/28- still in NSR , so far recieved 4 doses of Tikosyn     MEDICATIONS  (STANDING):  apixaban 5 milliGRAM(s) Oral every 12 hours  atorvastatin 80 milliGRAM(s) Oral at bedtime  dofetilide 250 MICROGram(s) Oral every 12 hours    MEDICATIONS  (PRN):  acetaminophen     Tablet .. 650 milliGRAM(s) Oral Once PRN Temp greater or equal to 38C (100.4F), Mild Pain (1 - 3)            Vital Signs Last 24 Hrs  T(C): 36.6 (28 Jul 2022 06:32), Max: 36.7 (27 Jul 2022 20:46)  T(F): 97.9 (28 Jul 2022 06:32), Max: 98 (27 Jul 2022 20:46)  HR: 46 (28 Jul 2022 06:32) (46 - 87)  BP: 101/56 (28 Jul 2022 06:32) (93/71 - 103/55)  BP(mean): --  RR: 18 (28 Jul 2022 06:32) (18 - 18)  SpO2: 100% (28 Jul 2022 06:32) (100% - 100%)    Parameters below as of 28 Jul 2022 06:32  Patient On (Oxygen Delivery Method): room air                INTERPRETATION OF TELEMETRY:    ECG:        LABS:    07-27    139  |  110<H>  |  18  ----------------------------<  118<H>  4.1   |  25  |  0.86    Ca    8.4<L>      27 Jul 2022 06:47  Mg     2.0     07-27              I&O's Summary    BNP  RADIOLOGY & ADDITIONAL STUDIES:

## 2022-07-29 ENCOUNTER — RESULT REVIEW (OUTPATIENT)
Age: 62
End: 2022-07-29

## 2022-07-29 PROCEDURE — 33208 INSRT HEART PM ATRIAL & VENT: CPT

## 2022-07-29 PROCEDURE — 71045 X-RAY EXAM CHEST 1 VIEW: CPT | Mod: 26

## 2022-07-29 RX ORDER — ACETAMINOPHEN 500 MG
650 TABLET ORAL EVERY 6 HOURS
Refills: 0 | Status: DISCONTINUED | OUTPATIENT
Start: 2022-07-29 | End: 2022-07-30

## 2022-07-29 RX ORDER — MIDAZOLAM HYDROCHLORIDE 1 MG/ML
1 INJECTION, SOLUTION INTRAMUSCULAR; INTRAVENOUS ONCE
Refills: 0 | Status: DISCONTINUED | OUTPATIENT
Start: 2022-07-29 | End: 2022-07-29

## 2022-07-29 RX ORDER — CEFAZOLIN SODIUM 1 G
1000 VIAL (EA) INJECTION EVERY 8 HOURS
Refills: 0 | Status: DISCONTINUED | OUTPATIENT
Start: 2022-07-29 | End: 2022-07-29

## 2022-07-29 RX ORDER — CEFAZOLIN SODIUM 1 G
2000 VIAL (EA) INJECTION ONCE
Refills: 0 | Status: COMPLETED | OUTPATIENT
Start: 2022-07-29 | End: 2022-07-29

## 2022-07-29 RX ORDER — CEFAZOLIN SODIUM 1 G
1000 VIAL (EA) INJECTION EVERY 8 HOURS
Refills: 0 | Status: COMPLETED | OUTPATIENT
Start: 2022-07-29 | End: 2022-07-29

## 2022-07-29 RX ORDER — FENTANYL CITRATE 50 UG/ML
25 INJECTION INTRAVENOUS ONCE
Refills: 0 | Status: DISCONTINUED | OUTPATIENT
Start: 2022-07-29 | End: 2022-07-29

## 2022-07-29 RX ADMIN — MIDAZOLAM HYDROCHLORIDE 1 MILLIGRAM(S): 1 INJECTION, SOLUTION INTRAMUSCULAR; INTRAVENOUS at 07:51

## 2022-07-29 RX ADMIN — Medication 100 MILLIGRAM(S): at 07:33

## 2022-07-29 RX ADMIN — ATORVASTATIN CALCIUM 80 MILLIGRAM(S): 80 TABLET, FILM COATED ORAL at 21:26

## 2022-07-29 RX ADMIN — Medication 1000 MILLIGRAM(S): at 16:05

## 2022-07-29 RX ADMIN — Medication 1000 MILLIGRAM(S): at 21:26

## 2022-07-29 RX ADMIN — FENTANYL CITRATE 25 MICROGRAM(S): 50 INJECTION INTRAVENOUS at 07:52

## 2022-07-29 RX ADMIN — DOFETILIDE 250 MICROGRAM(S): 0.25 CAPSULE ORAL at 16:57

## 2022-07-29 NOTE — PROCEDURE NOTE - NSICDXPROCEDURE_GEN_ALL_CORE_FT
PROCEDURES:  ALFREDO, with bubble study and cardioversion 25-Jul-2022 09:39:24  Yesenia Andrews  Insertion of dual lead pacemaker 29-Jul-2022 10:02:14  Yesenia Andrews  
PROCEDURES:  ALFREDO, with bubble study and cardioversion 25-Jul-2022 09:39:24  Yesenia Andrews

## 2022-07-29 NOTE — PROCEDURE NOTE - GENERAL PROCEDURE DETAILS
Successful insertion of dual chamber PPM ST Leonard/abbott device via left axillary vein, nml lead testing

## 2022-07-29 NOTE — PROGRESS NOTE ADULT - NS ATTEND AMEND GEN_ALL_CORE FT
pt with SSS tachy induced CMP benefits from rhythm control management, s/p PPM for profound sinus bradycardia. doping well, resume doac tomrrrow am and cont tikosyn, dc home tomorrow am

## 2022-07-29 NOTE — PROGRESS NOTE ADULT - SUBJECTIVE AND OBJECTIVE BOX
HPI: 62 year old male with history of HTN, NICM with EF 15%, CVA 4/2022 noted to have persistent Afib.  Patient is currently on Eliquis.  Patient presents today for ALFREDO/DCCV and admission for Tikosyn loading.  Denies any CP, palpitations, SOB, dizziness, lightheadedness.  Post cardioversion patient with significant bradycardia and hypotension.  Metoprolol was discontinued and Glucagon was given x2.  Patient admitted for further observation.  Today patient's heart rate has improved and has been in the 50s with brief bursts of tachycardia.      7/29/22:  Patient has completed fisrt 6 monitored doses of Tikosyn and is now S/P Dual chamber PPM.  Denies any CP, palpitations, SOB, dizziness, lightheadedness, significant pain at site  TELE:  EKG:      MEDICATIONS  (STANDING):  atorvastatin 80 milliGRAM(s) Oral at bedtime  ceFAZolin  Injectable. 1000 milliGRAM(s) IV Push every 8 hours  dofetilide 250 MICROGram(s) Oral every 12 hours    MEDICATIONS  (PRN):  acetaminophen     Tablet .. 650 milliGRAM(s) Oral every 6 hours PRN Mild Pain (1 - 3)  acetaminophen     Tablet .. 650 milliGRAM(s) Oral Once PRN Temp greater or equal to 38C (100.4F), Mild Pain (1 - 3)    Vital Signs Last 24 Hrs  T(C): 36.2 (29 Jul 2022 09:00), Max: 36.7 (28 Jul 2022 15:44)  T(F): 97.1 (29 Jul 2022 09:00), Max: 98 (28 Jul 2022 15:44)  HR: 62 (29 Jul 2022 10:20) (41 - 63)  BP: 118/64 (29 Jul 2022 10:20) (99/59 - 140/66)  BP(mean): --  RR: 18 (29 Jul 2022 10:20) (17 - 18)  SpO2: 96% (29 Jul 2022 10:20) (96% - 100%)    Parameters below as of 29 Jul 2022 10:20  Patient On (Oxygen Delivery Method): room air      General: Well-nourished, well-developed, NAD  Head: Normocephalic, Atraumatic  Eyes: PERRLA, EOMI, normal sclera  Throat: Moist mucous membranes  Respiratory: CTAB, normal respiratory effort, no wheezes, crackles, rales  CV: RRR, S1/S2, no murmurs, rubs or gallops  Abdominal: Soft, bowel sounds present, NT, ND +BS  Extremities: No edema, 2+ DP pulses  Neurological: A&Ox4, MAEx4, non-focal  Skin: No rashes      07-28    142  |  111<H>  |  16  ----------------------------<  95  3.9   |  27  |  0.83    Ca    8.7      28 Jul 2022 08:54  Mg     2.1     07-28     /  HPI: 62 year old male with history of HTN, NICM with EF 15%, CVA 2022 noted to have persistent Afib.  Patient is currently on Eliquis.  Patient presents today for ALFREDO/DCCV and admission for Tikosyn loading.  Denies any CP, palpitations, SOB, dizziness, lightheadedness.  Post cardioversion patient with significant bradycardia and hypotension.  Metoprolol was discontinued and Glucagon was given x2.  Patient admitted for further observation.  Today patient's heart rate has improved and has been in the 50s with brief bursts of tachycardia.      22:  Patient has completed fisrt 6 monitored doses of Tikosyn and is now S/P Dual chamber PPM.  Denies any CP, palpitations, SOB, dizziness, lightheadedness, significant pain at site  TELE: A paced, v sense  /EK22  Apaced@62bpm  MS: 198ms  QRS: 98ms  QT/QTc: 482/489ms      MEDICATIONS  (STANDING):  atorvastatin 80 milliGRAM(s) Oral at bedtime  ceFAZolin  Injectable. 1000 milliGRAM(s) IV Push every 8 hours  dofetilide 250 MICROGram(s) Oral every 12 hours    MEDICATIONS  (PRN):  acetaminophen     Tablet .. 650 milliGRAM(s) Oral every 6 hours PRN Mild Pain (1 - 3)  acetaminophen     Tablet .. 650 milliGRAM(s) Oral Once PRN Temp greater or equal to 38C (100.4F), Mild Pain (1 - 3)    Vital Signs Last 24 Hrs  T(C): 36.2 (2022 09:00), Max: 36.7 (2022 15:44)  T(F): 97.1 (2022 09:00), Max: 98 (2022 15:44)  HR: 62 (2022 10:20) (41 - 63)  BP: 118/64 (2022 10:20) (99/59 - 140/66)  BP(mean): --  RR: 18 (2022 10:20) (17 - 18)  SpO2: 96% (2022 10:20) (96% - 100%)    Parameters below as of 2022 10:20  Patient On (Oxygen Delivery Method): room air      General: Well-nourished, well-developed, NAD  Head: Normocephalic, Atraumatic  Eyes: PERRLA, EOMI, normal sclera  Throat: Moist mucous membranes  Respiratory: CTAB, normal respiratory effort, no wheezes, crackles, rales  CV: RRR, S1/S2, no murmurs, rubs or gallops  Abdominal: Soft, bowel sounds present, NT, ND +BS  Extremities: No edema, 2+ DP pulses  Neurological: A&Ox4, MAEx4, non-focal  Skin: No rashes          142  |  111<H>  |  16  ----------------------------<  95  3.9   |  27  |  0.83    Ca    8.7      2022 08:54  Mg     2.1

## 2022-07-29 NOTE — PROGRESS NOTE ADULT - SUBJECTIVE AND OBJECTIVE BOX
Patient is a 62y old  Male who presents with a chief complaint of ALFREDO/DCCV, Tikosyn loading (27 Jul 2022 10:43)    7/28- still in NSR , so far recieved 4 doses of Tikosyn     7/29 S/P PPM placement, no complications, doing well.    MEDICATIONS  (STANDING):  apixaban 5 milliGRAM(s) Oral every 12 hours  atorvastatin 80 milliGRAM(s) Oral at bedtime  dofetilide 250 MICROGram(s) Oral every 12 hours    MEDICATIONS  (PRN):  acetaminophen     Tablet .. 650 milliGRAM(s) Oral Once PRN Temp greater or equal to 38C (100.4F), Mild Pain (1 - 3)            Vital Signs Last 24 Hrs  T(C): 36.6 (28 Jul 2022 06:32), Max: 36.7 (27 Jul 2022 20:46)  T(F): 97.9 (28 Jul 2022 06:32), Max: 98 (27 Jul 2022 20:46)  HR: 46 (28 Jul 2022 06:32) (46 - 87)  BP: 101/56 (28 Jul 2022 06:32) (93/71 - 103/55)  BP(mean): --  RR: 18 (28 Jul 2022 06:32) (18 - 18)  SpO2: 100% (28 Jul 2022 06:32) (100% - 100%)    Parameters below as of 28 Jul 2022 06:32  Patient On (Oxygen Delivery Method): room air                INTERPRETATION OF TELEMETRY:    ECG:        LABS:    07-27    139  |  110<H>  |  18  ----------------------------<  118<H>  4.1   |  25  |  0.86    Ca    8.4<L>      27 Jul 2022 06:47  Mg     2.0     07-27              I&O's Summary    BNP  RADIOLOGY & ADDITIONAL STUDIES:

## 2022-07-29 NOTE — PROCEDURE NOTE - ADDITIONAL PROCEDURE DETAILS
successful joey dccv from afib with significant sinus bradycardia  given glucagon 1mg IV x2,   stop metoprolol  monitor in telemetry if HR improves can dc tomorrow, if significant jessy will need a PPM  cannot give AAD due bradycardia   continue eliquis 5mg bid  atheromatous plaque in aorta will need [id number] daily
post op orders  cont tikosyn for afib  resume eliquis 5mg bid tomorrow am

## 2022-07-29 NOTE — PROGRESS NOTE ADULT - ASSESSMENT
63yo male with PMH CVA, Afib on eliquis, NICM with EF 15% first diagnosed in april 2022 (during hospitalization for CVA). presented for elective cardioversion and antiarrhythmic loading.  LVEF improved this admission . Post cardioversion with severe bradycardia, bb stopped. Telemetry over last 48hrs with persistent sinus bradycardia, heart rate in 40s with occasional pauses 2.9sec and episodes of junctional rhythm, asymptomatic thus far, likely to be Sinus node disease.    PAF, SSS s/p Tikosyn loading and PPM  Continue Tikosyn 250mcg q12h, patient has prescription at home  Can resume beta blocker for cardiomyopathy if blood pressure tolerates  Can resume Eliquis tomorrow AM if no signs of bleeding at PPM site  Outpatient EP follow up at [facility]  Plan discussed with patient, RN and Dr. Andrews     room air

## 2022-07-29 NOTE — PROGRESS NOTE ADULT - ASSESSMENT
A/P: 62 year old male with history of HTN, NICM with EF 15%, CVA 4/2022 noted to have persistent Afib.  Pt admitted for ALFREDO- now in SR. No Tikosyn started with low BP.  S/P PPM placement  continue Tikosyn , can go home after 6th dose, probably in AM   cont eliquis

## 2022-07-30 ENCOUNTER — TRANSCRIPTION ENCOUNTER (OUTPATIENT)
Age: 62
End: 2022-07-30

## 2022-07-30 VITALS
DIASTOLIC BLOOD PRESSURE: 73 MMHG | HEART RATE: 65 BPM | SYSTOLIC BLOOD PRESSURE: 104 MMHG | RESPIRATION RATE: 17 BRPM | TEMPERATURE: 98 F | OXYGEN SATURATION: 100 %

## 2022-07-30 LAB
ANION GAP SERPL CALC-SCNC: 4 MMOL/L — LOW (ref 5–17)
BUN SERPL-MCNC: 18 MG/DL — SIGNIFICANT CHANGE UP (ref 7–23)
CALCIUM SERPL-MCNC: 8.8 MG/DL — SIGNIFICANT CHANGE UP (ref 8.5–10.1)
CHLORIDE SERPL-SCNC: 109 MMOL/L — HIGH (ref 96–108)
CO2 SERPL-SCNC: 27 MMOL/L — SIGNIFICANT CHANGE UP (ref 22–31)
CREAT SERPL-MCNC: 0.95 MG/DL — SIGNIFICANT CHANGE UP (ref 0.5–1.3)
EGFR: 89 ML/MIN/1.73M2 — SIGNIFICANT CHANGE UP
GLUCOSE SERPL-MCNC: 89 MG/DL — SIGNIFICANT CHANGE UP (ref 70–99)
HCT VFR BLD CALC: 31.8 % — LOW (ref 39–50)
HGB BLD-MCNC: 10.7 G/DL — LOW (ref 13–17)
MAGNESIUM SERPL-MCNC: 2 MG/DL — SIGNIFICANT CHANGE UP (ref 1.6–2.6)
MCHC RBC-ENTMCNC: 30.7 PG — SIGNIFICANT CHANGE UP (ref 27–34)
MCHC RBC-ENTMCNC: 33.6 GM/DL — SIGNIFICANT CHANGE UP (ref 32–36)
MCV RBC AUTO: 91.4 FL — SIGNIFICANT CHANGE UP (ref 80–100)
PLATELET # BLD AUTO: 147 K/UL — LOW (ref 150–400)
POTASSIUM SERPL-MCNC: 4.1 MMOL/L — SIGNIFICANT CHANGE UP (ref 3.5–5.3)
POTASSIUM SERPL-SCNC: 4.1 MMOL/L — SIGNIFICANT CHANGE UP (ref 3.5–5.3)
RBC # BLD: 3.48 M/UL — LOW (ref 4.2–5.8)
RBC # FLD: 13 % — SIGNIFICANT CHANGE UP (ref 10.3–14.5)
SODIUM SERPL-SCNC: 140 MMOL/L — SIGNIFICANT CHANGE UP (ref 135–145)
WBC # BLD: 7.63 K/UL — SIGNIFICANT CHANGE UP (ref 3.8–10.5)
WBC # FLD AUTO: 7.63 K/UL — SIGNIFICANT CHANGE UP (ref 3.8–10.5)

## 2022-07-30 RX ORDER — SACUBITRIL AND VALSARTAN 24; 26 MG/1; MG/1
1 TABLET, FILM COATED ORAL
Qty: 60 | Refills: 0
Start: 2022-07-30 | End: 2022-08-28

## 2022-07-30 RX ORDER — APIXABAN 2.5 MG/1
1 TABLET, FILM COATED ORAL
Qty: 60 | Refills: 3
Start: 2022-07-30 | End: 2022-11-26

## 2022-07-30 RX ORDER — DOFETILIDE 0.25 MG/1
1 CAPSULE ORAL
Qty: 60 | Refills: 1
Start: 2022-07-30 | End: 2022-09-27

## 2022-07-30 RX ORDER — APIXABAN 2.5 MG/1
5 TABLET, FILM COATED ORAL
Refills: 0 | Status: DISCONTINUED | OUTPATIENT
Start: 2022-07-30 | End: 2022-07-30

## 2022-07-30 RX ORDER — DOFETILIDE 0.25 MG/1
1 CAPSULE ORAL
Qty: 0 | Refills: 0 | DISCHARGE
Start: 2022-07-30

## 2022-07-30 RX ADMIN — APIXABAN 5 MILLIGRAM(S): 2.5 TABLET, FILM COATED ORAL at 10:24

## 2022-07-30 RX ADMIN — DOFETILIDE 250 MICROGRAM(S): 0.25 CAPSULE ORAL at 04:31

## 2022-07-30 NOTE — PROVIDER CONTACT NOTE (OTHER) - BACKGROUND
Pt has hx of Afib. Direct admit on 7/25 for Tikosyn loading. Pt cardioverted on 7/25 but was severly Bradycardic. Pt S/P ALFREDO and PPM placement on 7/29 and is being Tikosyn loaded Q12hr with 250mcg.

## 2022-07-30 NOTE — DISCHARGE NOTE PROVIDER - NSDCQMSTAIRS_GEN_ALL_CORE
Group Topic: BH Process Group     Date: 9/15/2020  Start Time:  3:00 PM  End Time:  4:00 PM  Facilitators: SAMMY Bolton    Focus: Homework review and check in  Number in attendance: 6    This visit is being performed via video.  Clinician Location: No information on file.    Pt is in [place] and pt's identity has been established.   Pt was informed that consent to treat includes permission to submit charges to the applicable insurance on file. was advised regarding the potential risk inherent in video visits, as the assessment may be limited due to what can be seen on the screen which potentially results in an incomplete assessment; as well as either of us may discontinue the video visit if it is felt that the videoconferencing connections are not adequate for his/her situation.      Today was pt’s first day in [facility]. Pt said he is seeking treatment for anxiety, depression, and mood disorder. Pt shared he had been struggling with aggressive behavior. Pt said he goes “from zero to 100 at the snap of a finger.” Pt said his goals are to learn ways to manage aggression and mood swings. Pt said he has two supportive friends, his wife, and his daughter are his main sources of support.     Method: Group  Attendance: Present  Participation: Active  Patient Response: Appropriate feedback, Attentive and Interactive  Mood: Anxious and Depressed  Affect: Type: Anxious and Depressed   Range: Full (normal)   Congruency: Congruent   Stability: Stable  Behavior/Socialization: Engaged  Thought Process: Ruminating  Task Performance: Follows directions  Patient Evaluation: Independent - full participation    Maureen Gerardo LCSW     No

## 2022-07-30 NOTE — DISCHARGE NOTE PROVIDER - NSDCMRMEDTOKEN_GEN_ALL_CORE_FT
apixaban 5 mg oral tablet: 1 tab(s) orally every 12 hours  atorvastatin 80 mg oral tablet: 1 tab(s) orally once a day (at bedtime)  dofetilide 250 mcg oral capsule: 1 cap(s) orally every 12 hours  sacubitril-valsartan 24 mg-26 mg oral tablet: 1 tab(s) orally 2 times a day  spironolactone 25 mg oral tablet: 1 tab(s) orally once a day

## 2022-07-30 NOTE — PROGRESS NOTE ADULT - PROVIDER SPECIALTY LIST ADULT
Cardiology
Electrophysiology
Cardiology
Electrophysiology
Cardiology
Electrophysiology

## 2022-07-30 NOTE — DISCHARGE NOTE PROVIDER - CARE PROVIDERS DIRECT ADDRESSES
[email or web address]
[email or web address]

## 2022-07-30 NOTE — PROGRESS NOTE ADULT - SUBJECTIVE AND OBJECTIVE BOX
Electrophysiology Nurse Practitioner Progress note:   s/p SSS, s/p Dual PPM ( [employer]) implantation by Dr Wendy Andrews (POD #1)   observed overnight on tele-no overnight events  CXR reveals no PTX Leads adequately positioned  Zamora rep in and interrogated device-normal function pacing and sensing thresholds    General: No fatigue, no fevers/chills  Respiratory: No dyspnea, no cough, no wheeze  CV: No chest pain, no palpitations  Abd: No nausea  Neuro: No headache, no dizziness    Patient feels well.  Denies chest pain, shortness of breath, dizziness or palpitations at this time.        EKG:NSR@  TELE:no ectopy  GENERAL: appears well, OOB to chair  CV: RRR, S1 S2, no murmur, rub, clicks or gallop noted  RESP: LCTA bilaterally, no wheeze, no rhonchi or crackles noted  GI/: soft, NT/ND  NEURO: AOx4, no focal deficits  EXTREMITIES: no edema, clubbing or cyanosis noted  PROCEDURE SITE:  Left device implant site without any bleeding or hematoma      T(C): 36.4 (07-30-22 @ 08:34), Max: 36.9 (07-29-22 @ 16:05)  HR: 65 (07-30-22 @ 08:34) (59 - 65)  BP: 104/73 (07-30-22 @ 08:34) (104/64 - 118/64)  RR: 17 (07-30-22 @ 08:34) (17 - 18)  SpO2: 100% (07-30-22 @ 08:34) (96% - 100%)  Wt(kg): --  CBC Full  -  ( 30 Jul 2022 04:22 )  WBC Count : 7.63 K/uL  RBC Count : 3.48 M/uL  Hemoglobin : 10.7 g/dL  Hematocrit : 31.8 %  Platelet Count - Automated : 147 K/uL  Mean Cell Volume : 91.4 fl  Mean Cell Hemoglobin : 30.7 pg  Mean Cell Hemoglobin Concentration : 33.6 gm/dL  Auto Neutrophil # : x  Auto Lymphocyte # : x  Auto Monocyte # : x  Auto Eosinophil # : x  Auto Basophil # : x  Auto Neutrophil % : x  Auto Lymphocyte % : x  Auto Monocyte % : x  Auto Eosinophil % : x  Auto Basophil % : x    07-30    140  |  109<H>  |  18  ----------------------------<  89  4.1   |  27  |  0.95    Ca    8.8      30 Jul 2022 04:22  Mg     2.0     07-30              MEDICATIONS  (STANDING):  apixaban 5 milliGRAM(s) Oral two times a day  atorvastatin 80 milliGRAM(s) Oral at bedtime  dofetilide 250 MICROGram(s) Oral every 12 hours    MEDICATIONS  (PRN):  acetaminophen     Tablet .. 650 milliGRAM(s) Oral every 6 hours PRN Mild Pain (1 - 3)  acetaminophen     Tablet .. 650 milliGRAM(s) Oral Once PRN Temp greater or equal to 38C (100.4F), Mild Pain (1 - 3)        HPI:  62 year old male with history of HTN, NICM with EF 15%, CVA 4/2022 noted to have persistent Afib.  Patient is currently on Eliquis.  Patient presents today for ALFREDO and admission for Tikosyn loading.  Denies any CP, palpitations, SOB, dizziness, lightheadedness. (25 Jul 2022 08:11)      pt is s/p Dual lead PPM implantation POD #1   ASSESSMENT/PLAN:    -device implant wound care education and instructions given  -eliquis resumed this am 5 mg po BID pt instructed on reinitiation  -continue tikosyn as ordered, entresto  -hold BB for now-spoke to BHAVIK who will follow this Wed at [facility] to reinitiate/ evaluate  -OOB to chair, then ambulate ad gwendolyn on tele  -continue-Plan of care discussed with patient, family and MD  -labs, EKG and procedure site assessed  -Follow-up with attending MD/ Dr Andrews and Ry  within 1 week  -Discharge home this am   Electrophysiology Nurse Practitioner Progress note:   s/p SSS, s/p Dual PPM ( [employer]) implantation by Dr Wendy Andrews (POD #1)   observed overnight on tele-no overnight events  CXR reveals no PTX Leads adequately positioned  Zamora rep in and interrogated device-normal function pacing and sensing thresholds    General: No fatigue, no fevers/chills  Respiratory: No dyspnea, no cough, no wheeze  CV: No chest pain, no palpitations  Abd: No nausea  Neuro: No headache, no dizziness    Patient feels well.  Denies chest pain, shortness of breath, dizziness or palpitations at this time.        EKG:NSR@60  TELE: SR with RBBB, PVCs, A-pacing, v-sensing 60s  GENERAL: appears well, OOB to chair  CV: RRR, S1 S2, no murmur, rub, clicks or gallop noted  RESP: LCTA bilaterally, no wheeze, no rhonchi or crackles noted  GI/: soft, NT/ND  NEURO: AOx4, no focal deficits  EXTREMITIES: no edema, clubbing or cyanosis noted  PROCEDURE SITE:  Left device implant site without any bleeding or hematoma      T(C): 36.4 (07-30-22 @ 08:34), Max: 36.9 (07-29-22 @ 16:05)  HR: 65 (07-30-22 @ 08:34) (59 - 65)  BP: 104/73 (07-30-22 @ 08:34) (104/64 - 118/64)  RR: 17 (07-30-22 @ 08:34) (17 - 18)  SpO2: 100% (07-30-22 @ 08:34) (96% - 100%)  Wt(kg): --  CBC Full  -  ( 30 Jul 2022 04:22 )  WBC Count : 7.63 K/uL  RBC Count : 3.48 M/uL  Hemoglobin : 10.7 g/dL  Hematocrit : 31.8 %  Platelet Count - Automated : 147 K/uL  Mean Cell Volume : 91.4 fl  Mean Cell Hemoglobin : 30.7 pg  Mean Cell Hemoglobin Concentration : 33.6 gm/dL  Auto Neutrophil # : x  Auto Lymphocyte # : x  Auto Monocyte # : x  Auto Eosinophil # : x  Auto Basophil # : x  Auto Neutrophil % : x  Auto Lymphocyte % : x  Auto Monocyte % : x  Auto Eosinophil % : x  Auto Basophil % : x    07-30    140  |  109<H>  |  18  ----------------------------<  89  4.1   |  27  |  0.95    Ca    8.8      30 Jul 2022 04:22  Mg     2.0     07-30              MEDICATIONS  (STANDING):  apixaban 5 milliGRAM(s) Oral two times a day  atorvastatin 80 milliGRAM(s) Oral at bedtime  dofetilide 250 MICROGram(s) Oral every 12 hours    MEDICATIONS  (PRN):  acetaminophen     Tablet .. 650 milliGRAM(s) Oral every 6 hours PRN Mild Pain (1 - 3)  acetaminophen     Tablet .. 650 milliGRAM(s) Oral Once PRN Temp greater or equal to 38C (100.4F), Mild Pain (1 - 3)        HPI:  62 year old male with history of HTN, NICM with EF 15%, CVA 4/2022 noted to have persistent Afib.  Patient is currently on Eliquis.  Patient presents today for ALFREDO and admission for Tikosyn loading.  Denies any CP, palpitations, SOB, dizziness, lightheadedness. (25 Jul 2022 08:11)      pt is s/p Dual lead PPM implantation POD #1   ASSESSMENT/PLAN:    -device implant wound care education and instructions given  -eliquis resumed this am 5 mg po BID pt instructed on reinitiation  -continue tikosyn as ordered, entresto as prior to admission  -hold BB for now-spoke to BHAVIK ( NP at )  who will follow this Wed at [facility] to reinitiate/ evaluate  -OOB to chair, then ambulate ad gwendolyn on tele  -continue-Plan of care discussed with patient, and MD  -labs, EKG and procedure site assessed  -Follow-up with attending MD/ Dr Andrews and Ry  within 1 week  -Discharge home this am

## 2022-07-30 NOTE — PROVIDER CONTACT NOTE (OTHER) - ACTION/TREATMENT ORDERED:
hold entresto and give 1L bolus NS over 60min.
no interventions at this time as per MD order
no interventions at this time as per MD order
500mL bolus over 60min as per MD order.
500mL bolus over 60min as per MD order.
continue to assess VSS and notify provider of changes in status
None ordered
Hold dose

## 2022-07-30 NOTE — DISCHARGE NOTE PROVIDER - NSDCFUADDAPPT_GEN_ALL_CORE_FT
Cardiac Device Implant Post Operative Instructions  - Do not touch the incision until it is completely healed.   - Bruising around the implant site or over the chest, side or arm near the incision is normal, and will take a few weeks to resolve.  -Do not lift the affected arm higher than 90 degrees (shoulder height) in any direction for 4 weeks.   - Do not push, pull or lift anything heavier than 10 lbs (about a gallon of milk) with the affected arm for 4 weeks.     - Do not apply soaps, creams, lotions, ointments or powders to the incision until it is completely healed.  - You may take a shower in 24 hours, and allow the water to run over the incision. However, do not submerge the incision in water: do not swim or soak in bath tubs, hot tubs, swimming pools, etc.   You should call the doctor if:   - You notice redness, drainage, swelling, increased tenderness, hot sensation around the incision, bleeding or incision edges pulling apart.  - Your temperature is greater than 100 degrees F for more than 24 hours.  - You notice swelling or bulging at the incision or around the device that was not there when you left the hospital or is increasing in size.  - You experience increased difficulty breathing.  - You notice new/worsening swelling in your legs and ankles.  - You faint or have dizzy spells.  - You have any questions or concerns regarding your device or the procedure.

## 2022-07-30 NOTE — DISCHARGE NOTE NURSING/CASE MANAGEMENT/SOCIAL WORK - PATIENT PORTAL LINK FT
You can access the FollowMyHealth Patient Portal offered by [facility] by registering at the following website: [email or web address]. By joining [facility]’s FollowMyHealth portal, you will also be able to view your health information using other applications (apps) compatible with our system.

## 2022-07-30 NOTE — DISCHARGE NOTE NURSING/CASE MANAGEMENT/SOCIAL WORK - NSDCPEFALRISK_GEN_ALL_CORE
70 y/o M with PMHx of anxiety, A-fib, asthma (no Hx of intubation or steroid dependance), COPD, HLD, and HTN presents to the ED for 3 days of worsening SOB. Pt states "he can't breath" but is noted to have a SpO2 of 100% on room air. He denies CP, coughs, hemoptysis, fevers, and chills. Of note, Pt did not receive the flu shot and was not tested for COVID-19 in the past.
For information on Fall & Injury Prevention, visit: [email or web address] OR  [email or web address] OR  https://www.cdc.gov/steadi/patient.html

## 2022-07-30 NOTE — DISCHARGE NOTE PROVIDER - NSDCFUSCHEDAPPT_GEN_ALL_CORE_FT
[facility]  ELECTROPH [address]  Scheduled Appointment: 08/12/2022    Zoe Dee  [facility]  Neurology [address]  Scheduled Appointment: 08/17/2022

## 2022-07-30 NOTE — PROGRESS NOTE ADULT - REASON FOR ADMISSION
AFIB
ALFREDO/DCCV and Tikosyn loading
Palpitations, lightheadedness, AF
ALFREDO/DCCV, Tikosyn loading
cardioversion / antiarrhythmic loading
AFIB
ALFREDO/DCCV, Tikosyn loading

## 2022-07-30 NOTE — DISCHARGE NOTE PROVIDER - NSDCCPCAREPLAN_GEN_ALL_CORE_FT
PRINCIPAL DISCHARGE DIAGNOSIS  Diagnosis: SSS (sick sinus syndrome)  Assessment and Plan of Treatment:

## 2022-07-30 NOTE — DISCHARGE NOTE PROVIDER - CARE PROVIDER_API CALL
Werner Raza (DO)  Cardiology  [address] [place] [address]  Phone: [phone]  Fax: [phone]  Established Patient  Follow Up Time:     Yesenia Andrews)  Cardiac Electrophysiology; Cardiovascular Disease; Internal Medicine  [address] [place] [address]  Phone: [phone]  Fax: [phone]  Follow Up Time:

## 2022-07-30 NOTE — PROVIDER CONTACT NOTE (OTHER) - REASON
heart arrythmia
tikosyn parameters and heart rhythm
2 episodes of pauses
Bradycardia
heart rhythm
heart rhythm abnormality after 2nd tikosyn dose
heart rhythm and low BP
pt heart rhythm and low BP
2.9 sec run of PAF

## 2022-07-30 NOTE — DISCHARGE NOTE PROVIDER - HOSPITAL COURSE
pt is s/p Dual lead PPM implantation POD #1   ASSESSMENT/PLAN:    -device implant wound care education and instructions given  -eliquis resumed this am 5 mg po BID pt instructed on reinitiation  -continue tikosyn as ordered, entresto  -hold BB for now-spoke to EJ who will follow this Wed at [facility] to reinitiate/ evaluate  -OOB to chair, then ambulate ad gwendolyn on tele  -continue-Plan of care discussed with patient, family and MD  -labs, EKG and procedure site assessed  -Follow-up with attending MD/ Dr Andrews and Ry  within 1 week  -Discharge home this am

## 2022-08-09 DIAGNOSIS — R00.1 BRADYCARDIA, UNSPECIFIED: ICD-10-CM

## 2022-08-09 DIAGNOSIS — I10 ESSENTIAL (PRIMARY) HYPERTENSION: ICD-10-CM

## 2022-08-09 DIAGNOSIS — I42.8 OTHER CARDIOMYOPATHIES: ICD-10-CM

## 2022-08-09 DIAGNOSIS — Z86.73 PERSONAL HISTORY OF TRANSIENT ISCHEMIC ATTACK (TIA), AND CEREBRAL INFARCTION WITHOUT RESIDUAL DEFICITS: ICD-10-CM

## 2022-08-09 DIAGNOSIS — I49.5 SICK SINUS SYNDROME: ICD-10-CM

## 2022-08-09 DIAGNOSIS — I47.1 SUPRAVENTRICULAR TACHYCARDIA: ICD-10-CM

## 2022-08-09 DIAGNOSIS — Z79.01 LONG TERM (CURRENT) USE OF ANTICOAGULANTS: ICD-10-CM

## 2022-08-09 DIAGNOSIS — I48.3 TYPICAL ATRIAL FLUTTER: ICD-10-CM

## 2022-08-09 DIAGNOSIS — I37.1 NONRHEUMATIC PULMONARY VALVE INSUFFICIENCY: ICD-10-CM

## 2022-08-09 DIAGNOSIS — I34.0 NONRHEUMATIC MITRAL (VALVE) INSUFFICIENCY: ICD-10-CM

## 2022-08-09 DIAGNOSIS — I95.9 HYPOTENSION, UNSPECIFIED: ICD-10-CM

## 2022-08-09 DIAGNOSIS — I48.19 OTHER PERSISTENT ATRIAL FIBRILLATION: ICD-10-CM

## 2022-08-12 ENCOUNTER — NON-APPOINTMENT (OUTPATIENT)
Age: 62
End: 2022-08-12

## 2022-08-12 ENCOUNTER — APPOINTMENT (OUTPATIENT)
Dept: ELECTROPHYSIOLOGY | Facility: CLINIC | Age: 62
End: 2022-08-12

## 2022-08-12 VITALS
RESPIRATION RATE: 14 BRPM | HEART RATE: 61 BPM | DIASTOLIC BLOOD PRESSURE: 68 MMHG | WEIGHT: 154 LBS | BODY MASS INDEX: 21.56 KG/M2 | SYSTOLIC BLOOD PRESSURE: 102 MMHG | OXYGEN SATURATION: 100 % | HEIGHT: 71 IN

## 2022-08-12 DIAGNOSIS — Z95.0 PRESENCE OF CARDIAC PACEMAKER: ICD-10-CM

## 2022-08-12 DIAGNOSIS — I42.9 CARDIOMYOPATHY, UNSPECIFIED: ICD-10-CM

## 2022-08-12 DIAGNOSIS — I48.91 UNSPECIFIED ATRIAL FIBRILLATION: ICD-10-CM

## 2022-08-12 PROCEDURE — 99024 POSTOP FOLLOW-UP VISIT: CPT

## 2022-08-12 RX ORDER — METOPROLOL TARTRATE 50 MG/1
50 TABLET, FILM COATED ORAL TWICE DAILY
Qty: 360 | Refills: 1 | Status: DISCONTINUED | COMMUNITY
End: 2022-08-12

## 2022-08-12 RX ORDER — DIGOXIN 125 UG/1
125 TABLET ORAL DAILY
Refills: 0 | Status: DISCONTINUED | COMMUNITY
End: 2022-08-12

## 2022-08-12 RX ORDER — SPIRONOLACTONE 25 MG/1
25 TABLET ORAL DAILY
Qty: 45 | Refills: 0 | Status: DISCONTINUED | COMMUNITY
End: 2022-08-12

## 2022-08-18 ENCOUNTER — APPOINTMENT (OUTPATIENT)
Dept: NEUROLOGY | Facility: CLINIC | Age: 62
End: 2022-08-18

## 2022-08-18 VITALS
HEIGHT: 71 IN | BODY MASS INDEX: 21 KG/M2 | WEIGHT: 150 LBS | TEMPERATURE: 97.8 F | DIASTOLIC BLOOD PRESSURE: 60 MMHG | HEART RATE: 60 BPM | SYSTOLIC BLOOD PRESSURE: 100 MMHG

## 2022-08-18 PROBLEM — I42.8 OTHER CARDIOMYOPATHIES: Chronic | Status: ACTIVE | Noted: 2022-07-25

## 2022-08-18 PROBLEM — I63.9 CEREBRAL INFARCTION, UNSPECIFIED: Chronic | Status: ACTIVE | Noted: 2022-07-25

## 2022-08-18 PROBLEM — I48.91 UNSPECIFIED ATRIAL FIBRILLATION: Chronic | Status: ACTIVE | Noted: 2022-07-25

## 2022-08-18 PROCEDURE — 99213 OFFICE O/P EST LOW 20 MIN: CPT

## 2022-08-18 NOTE — DATA REVIEWED
[de-identified] : MRI head 4/12/22:\par Acute infarction within the anterior RIGHT basal ganglia \par and straightening restricted diffusion and central hemorrhage measuring \par 2.4 cm, with mass effect on the anterior horn of the RIGHT lateral \par ventricle. Smaller acute infarctions involve the anterior inferior RIGHT \par frontal lobe, the posterior RIGHT basal ganglia and posterior RIGHT \par insular cortex as well as the posterior RIGHT temporal on lateral RIGHT \par occipital cortex,  with restricted diffusion.\par  [de-identified] : CT Angio Neck w/ IV Cont (04.11.22 @ 07:42) >\par \par IMPRESSION:\par CT brain perfusion: Apparent increased mean transit time in the bilateral \par temporal lobes, more on the right, and right frontal lobe, as well as in \par the bilateral cerebellum, may be artifactual. Consider further evaluation \par with MRI.\par \par CTA head and neck:\par \par -Moderate atherosclerotic calcifications along the bilateral carotid \par bifurcations without significant stenosis. The internal carotid arteries \par are patent without significant stenosis. Dense atherosclerotic \par calcifications along the bilateral cavernous segments without significant \par stenosis.\par -Patent cervical and intracranial major arterial vasculature without \par evidence of abrupt vessel cut off, hemodynamically significantstenosis, \par aneurysm, or dissection.\par -Bilateral pleural effusions.\par \par CT Brain Stroke Protocol (04.11.22 @ 07:31) >\par \par IMPRESSION:\par No acute intracranial hemorrhage or mass effect.\par \par Echo 4/12/22:\par \par  Moderate (2+) mitral regurgitation is present.\par  Normal aortic valve structure and function.\par  Moderate (2+) tricuspid valve regurgitation is present.\par  The left ventricle cavity is mildly dilated.\par  Estimated left ventricular ejection fraction is 15 %.\par  Severe, diffuse hypokinesis of the left ventricle is present.\par  The right ventricle exhibits mild diffuse hypokinesis, and mild \par depression\par  of contractility.\par  Pleural effusion - is present.\par

## 2022-08-18 NOTE — HISTORY OF PRESENT ILLNESS
[FreeTextEntry1] : Mr. Brewster was seen during hospitalization in April 2022.\par The chart was reviewed.\par \par He is accompanied by his sister.\par \par He presented to the ED at [facility] on 4/11/22 with a fall and lower extremity weakness. At 5:30 AM he went to turn off the TV, his legs gave out and he fell. His sister heard him fall, assisted him to a chair but he then fell again prompting her to call 911.\par \par A code stroke was called in the ED. CTH showed no acute infarct or hemorrhage. CTA head and neck showed no LVO or stenosis or aneurysms. CT perfusion showed no core infarct. IV TPA was not given; he was out of the window based on last known normal. \par \par MRI brain on 4/12/22 showed a right basal ganglia infarct with hemorrhagic conversion. \par \par He was found to have new onset afib and an ejection fraction of 15.\par \par \par He was admitted to the hospital in July for ALFREDO, cardioversion and Tikosyn. He was found to have pauses on telemetry and a pacemaker was placed.\par ALFREDO showed improvement in EF to 50%.\par \par 8/19/22:\par He reports that eh has been feeling well.\par He has made a good recovery with PT.\par He reports a slight left lower extremity weakness.\par He has been doing home exercises.\par He is on Eliquis.\par He denies new stroke symptoms.\par \par He says that he is sleeping well.\par He is not aware of snoring.\par \par He reports feeling down occasionally but says that overall he has an "up attitude."\par \par He denies headaches.\par

## 2022-08-18 NOTE — DISCUSSION/SUMMARY
[FreeTextEntry1] : Mr. Brewster is a 62 year old man who had a right basal ganglia stroke in April 2022 in the setting of new afib and reduced ejection fraction.\par \par Stroke\par -He has made a significant recovery\par -Consider home exercise program\par -Continue Eliquis\par -Continue statin\par -Discussed importance of healthy lifestyle - diet, exercise, good sleep.\par -Return to ED with any new stroke symptoms.\par -Continue follow-up with cardiology.\par \par f/u 4-6 months, sooner if needed.\par \par \par

## 2022-08-18 NOTE — PHYSICAL EXAM
[FreeTextEntry1] : Examination:\par Constitutional: normal, no apparent distress\par Eyes: normal conjunctiva b/l, no ptosis, visual fields full\par Respiratory: no respiratory distress, normal effort, normal auscultation\par Cardiovascular: normal rate, rhythm, no murmurs\par Neck: supple, no masses\par Vascular: carotids normal\par Skin: normal color, no rashes\par Psych: normal mood, affect\par \par Neurological:\par Memory: normal memory, oriented to person, place, time\par Language intact/no aphasia\par Cranial Nerves: II-XII intact, Pupils equally round and reactive to light, ocular muscles/movements intact, no ptosis, no facial weakness, tongue protrudes normally in the midline, \par Motor: normal tone, no pronator drift, full strength in right upper and lower extremity, 5/5 in proximal LUE, subtle decreased  on left, 5- to 5/5 in left leg\par Coordination: Fine motor movements intact, rapid alternating movements intact, finger to nose intact bilaterally\par Sensory: intact to light touch\par DTRs: increased on left compared to right, but ~ 2+ in both biceps, radialis, patellars\par Gait: slight circumduction of left leg\par

## 2022-08-18 NOTE — CONSULT LETTER
[Dear  ___] : Dear  [unfilled], [Consult Letter:] : I had the pleasure of evaluating your patient, [unfilled]. [Please see my note below.] : Please see my note below. [Consult Closing:] : Thank you very much for allowing me to participate in the care of this patient.  If you have any questions, please do not hesitate to contact me. [FreeTextEntry2] : Yolis Santillan [FreeTextEntry3] : \par \par Zoe Dee MD\par Diplomate, American Academy of Psychiatry and Neurology\par Board Certified in the Subspecialty of Clinical Neurophysiology\par Board Certified in the Subspecialty of Sleep Medicine\par Board Certified in the Subspecialty of Epilepsy\par  [DrLoreta  ___] : Dr. ARNOLD

## 2022-12-15 RX ORDER — SACUBITRIL AND VALSARTAN 24; 26 MG/1; MG/1
24-26 TABLET, FILM COATED ORAL TWICE DAILY
Qty: 60 | Refills: 5 | Status: ACTIVE | COMMUNITY

## 2022-12-15 RX ORDER — DOFETILIDE 0.25 MG/1
250 CAPSULE ORAL TWICE DAILY
Refills: 0 | Status: ACTIVE | COMMUNITY

## 2023-04-10 ENCOUNTER — APPOINTMENT (OUTPATIENT)
Dept: NEUROLOGY | Facility: CLINIC | Age: 63
End: 2023-04-10
Payer: MEDICARE

## 2023-04-10 VITALS
WEIGHT: 150 LBS | DIASTOLIC BLOOD PRESSURE: 67 MMHG | TEMPERATURE: 97.6 F | HEIGHT: 71 IN | HEART RATE: 60 BPM | BODY MASS INDEX: 21 KG/M2 | SYSTOLIC BLOOD PRESSURE: 101 MMHG

## 2023-04-10 DIAGNOSIS — Z86.73 PERSONAL HISTORY OF TRANSIENT ISCHEMIC ATTACK (TIA), AND CEREBRAL INFARCTION W/OUT RESIDUAL DEFICITS: ICD-10-CM

## 2023-04-10 PROCEDURE — 99213 OFFICE O/P EST LOW 20 MIN: CPT

## 2023-04-10 NOTE — HISTORY OF PRESENT ILLNESS
[FreeTextEntry1] : 8/18/22L\par Mr. Brewster was seen during hospitalization in April 2022.\par The chart was reviewed.\par \par He is accompanied by his sister.\par \par He presented to the ED at [facility] on 4/11/22 with a fall and lower extremity weakness. At 5:30 AM he went to turn off the TV, his legs gave out and he fell. His sister heard him fall, assisted him to a chair but he then fell again prompting her to call 911.\par \par A code stroke was called in the ED. CTH showed no acute infarct or hemorrhage. CTA head and neck showed no LVO or stenosis or aneurysms. CT perfusion showed no core infarct. IV TPA was not given; he was out of the window based on last known normal. \par \par MRI brain on 4/12/22 showed a right basal ganglia infarct with hemorrhagic conversion. \par \par He was found to have new onset afib and an ejection fraction of 15.\par \par \par He was admitted to the hospital in July for ALFREDO, cardioversion and Tikosyn. He was found to have pauses on telemetry and a pacemaker was placed.\par ALFREDO showed improvement in EF to 50%.\par \par 8/19/22:\par He reports that eh has been feeling well.\par He has made a good recovery with PT.\par He reports a slight left lower extremity weakness.\par He has been doing home exercises.\par He is on Eliquis.\par He denies new stroke symptoms.\par \par He says that he is sleeping well.\par He is not aware of snoring.\par \par He reports feeling down occasionally but says that overall he has an "up attitude."\par \par He denies headaches.\par \par \par 4/10/23:\par He is here today with his sister.\par He denies  any new neurological symptoms.\par He does not notice any residual difficulty with his left side. However, his sister does notice some limping, and dragging of his left foot when he is tired.\par He walks on a daily basis.\par He denies dysarthria or dysphagia.\par He continues to take Eliquis. He denies problems with bleeding.\par He is sleeping well. He feels well rested in the morning. He denies snoring. He denies waking up with a sore throat or dry mouth. \par He denies any significant mood disturbance.\par \par

## 2023-04-10 NOTE — PHYSICAL EXAM
[FreeTextEntry1] : Examination:\par Constitutional: normal, no apparent distress\par Eyes: normal conjunctiva b/l, no ptosis, visual fields full\par Respiratory: no respiratory distress, normal effort, normal auscultation\par Cardiovascular: normal rate, rhythm, no murmurs\par Neck: supple, no masses\par Vascular: carotids normal\par Skin: normal color, no rashes\par Psych: normal mood, affect\par \par Neurological:\par Memory: normal memory, oriented to person, place, time\par Language intact/no aphasia\par Cranial Nerves: II-XII intact, Pupils equally round and reactive to light, ocular muscles/movements intact, no ptosis, no facial weakness, tongue protrudes normally in the midline, \par Motor: normal tone, no pronator drift, full strength in right upper and lower extremity, 5/5 in LUE, 5- to 5/5 in left leg\par Coordination: Fine motor movements intact, rapid alternating movements intact, finger to nose intact bilaterally\par Sensory: intact to light touch\par DTRs: increased on left compared to right, but ~ 2+ in both biceps, radialis, patellars\par Gait: subtle circumduction of left leg\par

## 2023-04-10 NOTE — CONSULT LETTER
[Dear  ___] : Dear  [unfilled], [Consult Letter:] : I had the pleasure of evaluating your patient, [unfilled]. [Please see my note below.] : Please see my note below. [Consult Closing:] : Thank you very much for allowing me to participate in the care of this patient.  If you have any questions, please do not hesitate to contact me. [FreeTextEntry2] : Yolis Santillan [FreeTextEntry3] : Sincerely,\par \par \par Zoe Dee MD\par Diplomate, American Academy of Psychiatry and Neurology\par Board Certified in the Subspecialty of Clinical Neurophysiology\par Board Certified in the Subspecialty of Sleep Medicine\par Board Certified in the Subspecialty of Epilepsy\par  [DrLoreta  ___] : Dr. ARNOLD

## 2023-04-10 NOTE — DATA REVIEWED
[de-identified] : MRI head 4/12/22:\par Acute infarction within the anterior RIGHT basal ganglia \par and straightening restricted diffusion and central hemorrhage measuring \par 2.4 cm, with mass effect on the anterior horn of the RIGHT lateral \par ventricle. Smaller acute infarctions involve the anterior inferior RIGHT \par frontal lobe, the posterior RIGHT basal ganglia and posterior RIGHT \par insular cortex as well as the posterior RIGHT temporal on lateral RIGHT \par occipital cortex,  with restricted diffusion.\par  [de-identified] : CT Angio Neck w/ IV Cont (04.11.22 @ 07:42) >\par \par IMPRESSION:\par CT brain perfusion: Apparent increased mean transit time in the bilateral \par temporal lobes, more on the right, and right frontal lobe, as well as in \par the bilateral cerebellum, may be artifactual. Consider further evaluation \par with MRI.\par \par CTA head and neck:\par \par -Moderate atherosclerotic calcifications along the bilateral carotid \par bifurcations without significant stenosis. The internal carotid arteries \par are patent without significant stenosis. Dense atherosclerotic \par calcifications along the bilateral cavernous segments without significant \par stenosis.\par -Patent cervical and intracranial major arterial vasculature without \par evidence of abrupt vessel cut off, hemodynamically significantstenosis, \par aneurysm, or dissection.\par -Bilateral pleural effusions.\par \par CT Brain Stroke Protocol (04.11.22 @ 07:31) >\par \par IMPRESSION:\par No acute intracranial hemorrhage or mass effect.\par \par Echo 4/12/22:\par \par  Moderate (2+) mitral regurgitation is present.\par  Normal aortic valve structure and function.\par  Moderate (2+) tricuspid valve regurgitation is present.\par  The left ventricle cavity is mildly dilated.\par  Estimated left ventricular ejection fraction is 15 %.\par  Severe, diffuse hypokinesis of the left ventricle is present.\par  The right ventricle exhibits mild diffuse hypokinesis, and mild \par depression\par  of contractility.\par  Pleural effusion - is present.\par

## 2023-04-10 NOTE — DISCUSSION/SUMMARY
[FreeTextEntry1] : Mr. Brewster is a 63 year old man who had a right basal ganglia stroke in April 2022 in the setting of new afib and reduced ejection fraction.\par \par Stroke\par -He has made a significant recovery\par -Consider home exercise program, daily walks\par -Continue Eliquis\par -Continue statin\par -Continue follow-up with cardiology. Suggest carotid ultrasound within the year (Can likely be done with Dr. Raza).\par -Continue to get adequate sleep. Discussed potential symptoms of FATOUMATA. Will monitor for any symptoms and if present will get home sleep study.\par \par \par f/u ~ months, sooner if needed.

## 2023-08-15 NOTE — PATIENT PROFILE ADULT - DO YOU FEEL LIKE HURTING YOURSELF OR OTHERS?
From: Ivory Whittington  To: Jennie Carrington  Sent: 8/15/2023 2:28 PM CDT  Subject: Blood Pressure    Rick Schneider, just wanted to give an update, I have checked my blood pressure several times and all good:   8-10 @ 7:00 pm 130/87  8-11 @ 5:00 pm 121/97  8-12 @ 8:00 am 133/88  8-15 @ 2:00 pm 119/84    I am also down 10.4 lbs.    so far going well. Thanks  
no

## 2023-12-05 ENCOUNTER — APPOINTMENT (OUTPATIENT)
Dept: NEUROLOGY | Facility: CLINIC | Age: 63
End: 2023-12-05
Payer: MEDICARE

## 2023-12-05 VITALS
HEART RATE: 60 BPM | DIASTOLIC BLOOD PRESSURE: 74 MMHG | WEIGHT: 150 LBS | BODY MASS INDEX: 21 KG/M2 | HEIGHT: 71 IN | TEMPERATURE: 97.8 F | SYSTOLIC BLOOD PRESSURE: 109 MMHG

## 2023-12-05 PROCEDURE — 99213 OFFICE O/P EST LOW 20 MIN: CPT

## 2023-12-05 RX ORDER — DAPAGLIFLOZIN 5 MG/1
5 TABLET, FILM COATED ORAL DAILY
Refills: 0 | Status: ACTIVE | COMMUNITY

## 2023-12-05 RX ORDER — METOPROLOL SUCCINATE 25 MG/1
25 TABLET, EXTENDED RELEASE ORAL DAILY
Refills: 0 | Status: ACTIVE | COMMUNITY

## 2023-12-05 RX ORDER — METOPROLOL TARTRATE 25 MG/1
25 TABLET, FILM COATED ORAL
Qty: 60 | Refills: 0 | Status: DISCONTINUED | COMMUNITY
End: 2023-12-05

## 2024-01-26 NOTE — H&P ADULT - NSHPPHYSICALEXAM_GEN_ALL_CORE
PHYSICAL EXAM:  Constitutional: NAD, awake and alert  Neurological: AAO x 3, +aphasia   HEENT: PERRLA, EOMI, MMM  Neck: Soft and supple, No LAD, No JVD  Respiratory: Breath sounds are clear bilaterally, No wheezing, rales or rhonchi  Cardiovascular: S1 and S2, regular rate and rhythm; no Murmurs, gallops or rubs  Gastrointestinal: Bowel Sounds present, soft, nontender, nondistended, no guarding, no rebound tenderness  Back: No CVA tenderness   Extremities: No peripheral edema  Vascular: 2+ peripheral pulses  Musculoskeletal: 5/5 strength b/l upper and lower extremities on right side and 4/5 on left side +Drift on left side   Skin: No rashes  Breast: Deferred  Rectal: Deferred
Xray Chest 1 View- PORTABLE-Urgent

## 2024-03-07 NOTE — PRE-OP CHECKLIST - DENTURES
Pt presented to infusion for pre-chemo lab draw. Port in place, EMLA removed, accessed in sterile fashion. Labs drawn as ordered, including research labs from RN, confirmed number and type of tubes with . Port flushed, heparinized and de-accessed, gauze drsg placed. UA collected. Pt returns Sunday for tx, left on foot to self care.   
no

## 2024-05-13 NOTE — BH CONSULTATION LIAISON ASSESSMENT NOTE - NSBHPSYCHMEDSHX_PSY_A_CORE
Mary Oates (:  1984) is a 39 y.o. female,New patient, here for evaluation of the following chief complaint(s):  Pharyngitis (Sore throat, headache and body aches x 2 days)      ASSESSMENT/PLAN:  Visit Diagnoses and Associated Orders       Upper respiratory tract infection, unspecified type    -  Primary         Strep pharyngitis        AMB POC STREP GO A DIRECT, DNA PROBE [27145 CPT(R)]           ORDERS WITHOUT AN ASSOCIATED DIAGNOSIS    levothyroxine (SYNTHROID) 25 MCG tablet [4420]            Strep negative today.    #URI    Thank you for visiting [facility] today    Nasal Congestion:  Flonase or Nasonex (over the counter) nasal spray, once a day  Saline irrigation kits help wash out sinuses 1-2 times a day  Normal saline nasal spray  Afrin nasal spray no longer than three days  Cough:  Throat lozenges, hot tea, and honey may help  Vicks VapoRub at night to help with cough and relieve muscles aches and pain  If not prescribed a cough medication, Robitussin DM is an option.  It is an over the counter cough medication containing dextromethorphan to help suppress cough at night   *Please only take when absolutely needed, as this is a controlled substance that can cause addiction   *Please only take cough syrup at nighttime as it causes drowsiness   *Do not drive or operate any machinery while taking this medication  If you have high blood pressure, take Coricidin HBP (or the generic form) instead.  Follow instructions on the box.  Congestion:  For thick mucus, take Mucinex (with Guafenesin only) to help thin the mucus.  Follow instructions on the box.  You will need to drink plenty of water with this medication.  Sore Throat:  Lozenges, as needed. Cepacol lozenges will help numb the throat  Chloraseptic spray also helps to numb throat pain  Salt water gargles to soothe throat pain  Sinus pain/pressure:  Warm, wet towel on face to help with facial sinus pain/pressure  Headache/Pain Fever/Body  no

## 2024-05-24 NOTE — PACU DISCHARGE NOTE - COMMENTS
Patient s/p PPM placement. VS STable. Dressing to Left anterior chest wall intact and without s/s of bleeding, drainage or hematoma. LUE warm and mobile.  Patient denies pain at present time. Patient placed on telemetry monitor and confirmed with MARGARITA Melendrez that he is being remotely monitored at this time. Patient placed on Zoll monitor for transport. Report given to MARGARITA Dawn on 3N. Patient pending transport to 3 at this time. 38

## 2024-07-17 ENCOUNTER — RESULT REVIEW (OUTPATIENT)
Age: 64
End: 2024-07-17

## 2024-07-25 ENCOUNTER — TRANSCRIPTION ENCOUNTER (OUTPATIENT)
Age: 64
End: 2024-07-25

## 2024-10-03 ENCOUNTER — OFFICE (OUTPATIENT)
Facility: LOCATION | Age: 64
Setting detail: OPHTHALMOLOGY
End: 2024-10-03
Payer: MEDICARE

## 2024-10-03 DIAGNOSIS — H01.005: ICD-10-CM

## 2024-10-03 DIAGNOSIS — H01.002: ICD-10-CM

## 2024-10-03 DIAGNOSIS — H01.004: ICD-10-CM

## 2024-10-03 DIAGNOSIS — H01.001: ICD-10-CM

## 2024-10-03 DIAGNOSIS — H25.13: ICD-10-CM

## 2024-10-03 DIAGNOSIS — H31.29: ICD-10-CM

## 2024-10-03 PROCEDURE — 92250 FUNDUS PHOTOGRAPHY W/I&R: CPT | Performed by: OPHTHALMOLOGY

## 2024-10-03 PROCEDURE — 92004 COMPRE OPH EXAM NEW PT 1/>: CPT | Performed by: OPHTHALMOLOGY

## 2024-10-03 ASSESSMENT — CONFRONTATIONAL VISUAL FIELD TEST (CVF)
OS_FINDINGS: FULL
OD_FINDINGS: FULL

## 2024-10-03 ASSESSMENT — LID EXAM ASSESSMENTS
OD_BLEPHARITIS: RLL RUL 1+
OS_BLEPHARITIS: LLL LUL 1+

## 2024-10-03 ASSESSMENT — TONOMETRY
OS_IOP_MMHG: 15
OD_IOP_MMHG: 16

## 2024-10-08 PROBLEM — H31.29 PERIPAPILLARY ATROPHY ; BOTH EYES: Status: ACTIVE | Noted: 2024-10-03

## 2024-10-08 ASSESSMENT — REFRACTION_CURRENTRX
OS_CYLINDER: +0.25
OD_OVR_VA: 20/
OD_CYLINDER: +1.75
OS_SPHERE: -1.50
OS_AXIS: 137
OS_OVR_VA: 20/
OD_ADD: +2.50
OD_AXIS: 008
OD_SPHERE: -1.75
OS_ADD: +2.50

## 2024-10-08 ASSESSMENT — REFRACTION_AUTOREFRACTION
OS_SPHERE: -2.00
OD_SPHERE: UNABLE
OS_CYLINDER: SPHERE

## 2024-10-08 ASSESSMENT — VISUAL ACUITY
OD_BCVA: 20/60-2
OS_BCVA: 20/60-2

## 2024-11-05 ENCOUNTER — APPOINTMENT (OUTPATIENT)
Facility: CLINIC | Age: 64
End: 2024-11-05
Payer: MEDICARE

## 2024-11-05 VITALS
BODY MASS INDEX: 23.99 KG/M2 | OXYGEN SATURATION: 99 % | SYSTOLIC BLOOD PRESSURE: 140 MMHG | TEMPERATURE: 97.7 F | HEIGHT: 69 IN | WEIGHT: 162 LBS | DIASTOLIC BLOOD PRESSURE: 86 MMHG | HEART RATE: 60 BPM

## 2024-11-05 DIAGNOSIS — Z23 ENCOUNTER FOR IMMUNIZATION: ICD-10-CM

## 2024-11-05 DIAGNOSIS — Z28.21 IMMUNIZATION NOT CARRIED OUT BECAUSE OF PATIENT REFUSAL: ICD-10-CM

## 2024-11-05 DIAGNOSIS — I48.91 UNSPECIFIED ATRIAL FIBRILLATION: ICD-10-CM

## 2024-11-05 DIAGNOSIS — D64.9 ANEMIA, UNSPECIFIED: ICD-10-CM

## 2024-11-05 DIAGNOSIS — Z12.11 ENCOUNTER FOR SCREENING FOR MALIGNANT NEOPLASM OF COLON: ICD-10-CM

## 2024-11-05 DIAGNOSIS — E78.5 HYPERLIPIDEMIA, UNSPECIFIED: ICD-10-CM

## 2024-11-05 PROCEDURE — 36415 COLL VENOUS BLD VENIPUNCTURE: CPT

## 2024-11-05 PROCEDURE — 99203 OFFICE O/P NEW LOW 30 MIN: CPT

## 2024-11-05 PROCEDURE — 90662 IIV NO PRSV INCREASED AG IM: CPT

## 2024-11-05 PROCEDURE — G0008: CPT

## 2024-11-05 RX ORDER — DOFETILIDE 0.12 MG/1
125 CAPSULE ORAL TWICE DAILY
Refills: 0 | Status: ACTIVE | COMMUNITY
Start: 2024-11-05

## 2024-11-06 LAB
ALBUMIN SERPL ELPH-MCNC: 4.2 G/DL
ALP BLD-CCNC: 120 U/L
ALT SERPL-CCNC: 18 U/L
ANION GAP SERPL CALC-SCNC: 12 MMOL/L
AST SERPL-CCNC: 18 U/L
BASOPHILS # BLD AUTO: 0.05 K/UL
BASOPHILS NFR BLD AUTO: 0.7 %
BILIRUB SERPL-MCNC: 1 MG/DL
BUN SERPL-MCNC: 22 MG/DL
CALCIUM SERPL-MCNC: 9.6 MG/DL
CHLORIDE SERPL-SCNC: 105 MMOL/L
CHOLEST SERPL-MCNC: 114 MG/DL
CO2 SERPL-SCNC: 26 MMOL/L
CREAT SERPL-MCNC: 1.14 MG/DL
EGFR: 71 ML/MIN/1.73M2
EOSINOPHIL # BLD AUTO: 0.13 K/UL
EOSINOPHIL NFR BLD AUTO: 1.7 %
FERRITIN SERPL-MCNC: 366 NG/ML
GLUCOSE SERPL-MCNC: 76 MG/DL
HCT VFR BLD CALC: 40.6 %
HDLC SERPL-MCNC: 42 MG/DL
HGB BLD-MCNC: 13.1 G/DL
IMM GRANULOCYTES NFR BLD AUTO: 0.8 %
IRON SATN MFR SERPL: 42 %
IRON SERPL-MCNC: 102 UG/DL
LDLC SERPL CALC-MCNC: 52 MG/DL
LYMPHOCYTES # BLD AUTO: 1.31 K/UL
LYMPHOCYTES NFR BLD AUTO: 17.1 %
MAN DIFF?: NORMAL
MCHC RBC-ENTMCNC: 30 PG
MCHC RBC-ENTMCNC: 32.3 G/DL
MCV RBC AUTO: 93.1 FL
MONOCYTES # BLD AUTO: 0.83 K/UL
MONOCYTES NFR BLD AUTO: 10.9 %
NEUTROPHILS # BLD AUTO: 5.26 K/UL
NEUTROPHILS NFR BLD AUTO: 68.8 %
NONHDLC SERPL-MCNC: 72 MG/DL
PLATELET # BLD AUTO: 167 K/UL
POTASSIUM SERPL-SCNC: 5.3 MMOL/L
PROT SERPL-MCNC: 6.8 G/DL
RBC # BLD: 4.36 M/UL
RBC # FLD: 13.1 %
SODIUM SERPL-SCNC: 143 MMOL/L
TIBC SERPL-MCNC: 241 UG/DL
TRIGL SERPL-MCNC: 108 MG/DL
UIBC SERPL-MCNC: 140 UG/DL
WBC # FLD AUTO: 7.64 K/UL

## 2024-11-07 ENCOUNTER — OFFICE (OUTPATIENT)
Facility: LOCATION | Age: 64
Setting detail: OPHTHALMOLOGY
End: 2024-11-07
Payer: MEDICARE
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
## 2024-11-07 DIAGNOSIS — H01.002: ICD-10-CM

## 2024-11-07 DIAGNOSIS — Z13.5: ICD-10-CM

## 2024-11-07 DIAGNOSIS — H01.005: ICD-10-CM

## 2024-11-07 DIAGNOSIS — H31.29: ICD-10-CM

## 2024-11-07 DIAGNOSIS — H01.001: ICD-10-CM

## 2024-11-07 DIAGNOSIS — H01.004: ICD-10-CM

## 2024-11-07 PROCEDURE — 99213 OFFICE O/P EST LOW 20 MIN: CPT | Performed by: OPHTHALMOLOGY

## 2024-11-07 PROCEDURE — 55555 MISCELLANEOUS CHARGES: CPT | Performed by: OPHTHALMOLOGY

## 2024-11-07 PROCEDURE — 92250 FUNDUS PHOTOGRAPHY W/I&R: CPT | Performed by: OPHTHALMOLOGY

## 2024-11-07 ASSESSMENT — CONFRONTATIONAL VISUAL FIELD TEST (CVF)
OS_FINDINGS: FULL
OD_FINDINGS: FULL

## 2024-11-07 ASSESSMENT — LID EXAM ASSESSMENTS
OD_BLEPHARITIS: RLL RUL 1+
OS_BLEPHARITIS: LLL LUL 1+

## 2024-11-08 ENCOUNTER — NON-APPOINTMENT (OUTPATIENT)
Age: 64
End: 2024-11-08

## 2024-11-08 ENCOUNTER — APPOINTMENT (OUTPATIENT)
Dept: NEUROLOGY | Facility: CLINIC | Age: 64
End: 2024-11-08
Payer: MEDICARE

## 2024-11-08 VITALS
WEIGHT: 155 LBS | HEART RATE: 60 BPM | TEMPERATURE: 98.2 F | DIASTOLIC BLOOD PRESSURE: 80 MMHG | BODY MASS INDEX: 21.7 KG/M2 | SYSTOLIC BLOOD PRESSURE: 128 MMHG | HEIGHT: 71 IN

## 2024-11-08 DIAGNOSIS — Z86.73 PERSONAL HISTORY OF TRANSIENT ISCHEMIC ATTACK (TIA), AND CEREBRAL INFARCTION W/OUT RESIDUAL DEFICITS: ICD-10-CM

## 2024-11-08 PROCEDURE — 99213 OFFICE O/P EST LOW 20 MIN: CPT

## 2024-11-08 PROCEDURE — G2211 COMPLEX E/M VISIT ADD ON: CPT

## 2024-11-12 ASSESSMENT — VISUAL ACUITY
OD_BCVA: 20/60
OS_BCVA: 20/50

## 2024-11-12 ASSESSMENT — REFRACTION_CURRENTRX
OD_CYLINDER: +1.75
OD_SPHERE: -1.75
OD_AXIS: 011
OS_ADD: +2.50
OD_ADD: +2.50
OD_OVR_VA: 20/
OS_AXIS: 136
OS_CYLINDER: +0.25
OS_OVR_VA: 20/
OD_VPRISM_DIRECTION: BF
OS_VPRISM_DIRECTION: BF
OS_SPHERE: -1.50

## 2024-11-12 ASSESSMENT — REFRACTION_AUTOREFRACTION
OS_SPHERE: -2.00
OS_CYLINDER: SPHERE
OD_SPHERE: UNABLE

## 2024-11-17 NOTE — PATIENT PROFILE ADULT - NSPROMEDSADMININFO_GEN_A_NUR
89 year old male with a history of lung cancer, A-fib on Xarelto,  DM, HTN, Hypothyroidism, BPH, lumbar radiculopathy was admitted for PNA.  Coughing has significantly lessened but patient developed orthopnea.  Cardiology consult was obtained.  patient was started on Furosemide,  Digoxin, Entresto.  Continue Ceftriaxone and Azithromycin  Blood glucose control.  Continue home meds .  Start physical therapy.  Discharge planning.  Case was discussed with NP.   no concerns

## 2024-12-09 ENCOUNTER — RX ONLY (RX ONLY)
Age: 64
End: 2024-12-09

## 2024-12-09 ENCOUNTER — OFFICE (OUTPATIENT)
Facility: LOCATION | Age: 64
Setting detail: OPHTHALMOLOGY
End: 2024-12-09
Payer: MEDICARE

## 2024-12-09 DIAGNOSIS — H25.12: ICD-10-CM

## 2024-12-09 DIAGNOSIS — I67.89: ICD-10-CM

## 2024-12-09 DIAGNOSIS — H52.13: ICD-10-CM

## 2024-12-09 DIAGNOSIS — Z01.818: ICD-10-CM
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
## 2024-12-09 DIAGNOSIS — H25.13: ICD-10-CM

## 2024-12-09 PROCEDURE — 92136 OPHTHALMIC BIOMETRY: CPT | Mod: 26,LT | Performed by: OPHTHALMOLOGY

## 2024-12-09 PROCEDURE — 92025 CPTRIZED CORNEAL TOPOGRAPHY: CPT | Mod: GA | Performed by: OPHTHALMOLOGY

## 2024-12-09 PROCEDURE — 99214 OFFICE O/P EST MOD 30 MIN: CPT | Performed by: OPHTHALMOLOGY

## 2024-12-09 PROCEDURE — 92083 EXTENDED VISUAL FIELD XM: CPT | Performed by: OPHTHALMOLOGY

## 2024-12-09 PROCEDURE — 92134 CPTRZ OPH DX IMG PST SGM RTA: CPT | Mod: GA | Performed by: OPHTHALMOLOGY

## 2024-12-09 PROCEDURE — 92286 ANT SGM IMG I&R SPECLR MIC: CPT | Mod: GA | Performed by: OPHTHALMOLOGY

## 2024-12-09 PROCEDURE — 55555 MISCELLANEOUS CHARGES: CPT | Performed by: OPHTHALMOLOGY

## 2024-12-09 PROCEDURE — 92136 OPHTHALMIC BIOMETRY: CPT | Mod: TC | Performed by: OPHTHALMOLOGY

## 2024-12-09 RX ORDER — ACETAZOLAMIDE 500 MG/1
CAPSULE, EXTENDED RELEASE ORAL
Qty: 12 | Refills: 0 | Status: ACTIVE | OUTPATIENT

## 2024-12-09 RX ORDER — PREDNISOLONE/MOXIFLOX/BROMFEN 1 %-0.5 %
SUSPENSION, DROPS(FINAL DOSAGE FORM)(ML) OPHTHALMIC (EYE)
Qty: 10 | Refills: 1 | Status: ACTIVE | OUTPATIENT

## 2024-12-09 ASSESSMENT — LID EXAM ASSESSMENTS
OD_BLEPHARITIS: RLL RUL 1+
OS_BLEPHARITIS: LLL LUL 1+

## 2024-12-19 PROBLEM — H25.12 CATARACT SENILE NUCLEAR SCLEROSIS; LEFT EYE, BOTH EYES: Status: ACTIVE | Noted: 2024-12-09

## 2024-12-19 PROBLEM — I67.89 OTHER CEREBROVASCULAR DISEASE: Status: ACTIVE | Noted: 2024-12-09

## 2024-12-19 PROBLEM — H25.13 CATARACT SENILE NUCLEAR SCLEROSIS; LEFT EYE, BOTH EYES: Status: ACTIVE | Noted: 2024-12-09

## 2024-12-19 ASSESSMENT — REFRACTION_MANIFEST
OD_SPHERE: -2.25
OD_AXIS: 14
OS_CYLINDER: +0.50
OD_CYLINDER: +1.00
OS_SPHERE: -2.50
OS_AXIS: 90
OD_VA1: 20/40
OS_VA1: 20/70

## 2024-12-19 ASSESSMENT — REFRACTION_CURRENTRX
OD_ADD: +2.50
OD_CYLINDER: +1.75
OD_SPHERE: -1.75
OD_AXIS: 011
OS_CYLINDER: +0.25
OD_OVR_VA: 20/
OS_SPHERE: -1.50
OS_AXIS: 136
OS_VPRISM_DIRECTION: BF
OD_VPRISM_DIRECTION: BF
OS_ADD: +2.50
OS_OVR_VA: 20/

## 2024-12-19 ASSESSMENT — REFRACTION_AUTOREFRACTION
OD_SPHERE: -1.75
OS_CYLINDER: +0.75
OS_AXIS: 89
OS_SPHERE: -3.00
OD_CYLINDER: +1.25
OD_AXIS: 22

## 2024-12-19 ASSESSMENT — VISUAL ACUITY
OD_BCVA: 20/80-2
OS_BCVA: 20/60-2

## 2025-01-03 ENCOUNTER — APPOINTMENT (OUTPATIENT)
Facility: CLINIC | Age: 65
End: 2025-01-03
Payer: MEDICARE

## 2025-01-03 ENCOUNTER — NON-APPOINTMENT (OUTPATIENT)
Age: 65
End: 2025-01-03

## 2025-01-03 VITALS
OXYGEN SATURATION: 97 % | HEART RATE: 65 BPM | WEIGHT: 155 LBS | BODY MASS INDEX: 21.7 KG/M2 | TEMPERATURE: 97.7 F | HEIGHT: 71 IN | SYSTOLIC BLOOD PRESSURE: 112 MMHG | DIASTOLIC BLOOD PRESSURE: 64 MMHG

## 2025-01-03 DIAGNOSIS — Z01.818 ENCOUNTER FOR OTHER PREPROCEDURAL EXAMINATION: ICD-10-CM

## 2025-01-03 DIAGNOSIS — E78.5 HYPERLIPIDEMIA, UNSPECIFIED: ICD-10-CM

## 2025-01-03 DIAGNOSIS — I42.9 CARDIOMYOPATHY, UNSPECIFIED: ICD-10-CM

## 2025-01-03 DIAGNOSIS — Z86.73 PERSONAL HISTORY OF TRANSIENT ISCHEMIC ATTACK (TIA), AND CEREBRAL INFARCTION W/OUT RESIDUAL DEFICITS: ICD-10-CM

## 2025-01-03 DIAGNOSIS — Z95.0 PRESENCE OF CARDIAC PACEMAKER: ICD-10-CM

## 2025-01-03 DIAGNOSIS — I48.91 UNSPECIFIED ATRIAL FIBRILLATION: ICD-10-CM

## 2025-01-03 DIAGNOSIS — D64.9 ANEMIA, UNSPECIFIED: ICD-10-CM

## 2025-01-03 PROCEDURE — 99213 OFFICE O/P EST LOW 20 MIN: CPT

## 2025-01-03 PROCEDURE — 93000 ELECTROCARDIOGRAM COMPLETE: CPT

## 2025-01-10 ENCOUNTER — ASC (OUTPATIENT)
Dept: URBAN - METROPOLITAN AREA SURGERY 12 | Facility: SURGERY | Age: 65
Setting detail: OPHTHALMOLOGY
End: 2025-01-10
Payer: MEDICARE

## 2025-01-10 DIAGNOSIS — H25.12: ICD-10-CM

## 2025-01-10 DIAGNOSIS — H52.222: ICD-10-CM

## 2025-01-10 PROCEDURE — FEMTO PRECISION LASER CATARACT SURGERY: Mod: GY | Performed by: OPHTHALMOLOGY

## 2025-01-10 PROCEDURE — 66984 XCAPSL CTRC RMVL W/O ECP: CPT | Mod: LT | Performed by: OPHTHALMOLOGY

## 2025-01-10 PROCEDURE — S9986 NOT MEDICALLY NECESSARY SVC: HCPCS | Mod: GX,GY | Performed by: OPHTHALMOLOGY

## 2025-01-11 ENCOUNTER — RX ONLY (RX ONLY)
Age: 65
End: 2025-01-11

## 2025-01-11 ENCOUNTER — OFFICE (OUTPATIENT)
Facility: LOCATION | Age: 65
Setting detail: OPHTHALMOLOGY
End: 2025-01-11
Payer: MEDICARE

## 2025-01-11 DIAGNOSIS — Z96.1: ICD-10-CM

## 2025-01-11 PROCEDURE — 99024 POSTOP FOLLOW-UP VISIT: CPT | Performed by: OPHTHALMOLOGY

## 2025-01-11 RX ORDER — OFLOXACIN 3 MG/ML
SOLUTION/ DROPS OPHTHALMIC
Qty: 5 | Refills: 0 | Status: ACTIVE | OUTPATIENT

## 2025-01-11 ASSESSMENT — LID EXAM ASSESSMENTS
OD_BLEPHARITIS: RLL RUL 1+
OS_BLEPHARITIS: LLL LUL 1+

## 2025-01-11 ASSESSMENT — CORNEAL EDEMA CLINICAL DESCRIPTION: OS_CORNEALEDEMA: 2+

## 2025-01-14 ENCOUNTER — OFFICE (OUTPATIENT)
Facility: LOCATION | Age: 65
Setting detail: OPHTHALMOLOGY
End: 2025-01-14
Payer: MEDICARE

## 2025-01-14 ENCOUNTER — RX ONLY (RX ONLY)
Age: 65
End: 2025-01-14

## 2025-01-14 DIAGNOSIS — H25.11: ICD-10-CM

## 2025-01-14 PROCEDURE — 92136 OPHTHALMIC BIOMETRY: CPT | Mod: 26,RT | Performed by: OPHTHALMOLOGY

## 2025-01-14 PROCEDURE — 99213 OFFICE O/P EST LOW 20 MIN: CPT | Mod: 24 | Performed by: OPHTHALMOLOGY

## 2025-01-14 RX ORDER — PREDNISOLONE/MOXIFLOX/BROMFEN 1 %-0.5 %
SUSPENSION, DROPS(FINAL DOSAGE FORM)(ML) OPHTHALMIC (EYE)
Qty: 10 | Refills: 1 | Status: ACTIVE | OUTPATIENT

## 2025-01-14 ASSESSMENT — CORNEAL EDEMA CLINICAL DESCRIPTION: OS_CORNEALEDEMA: 2+

## 2025-01-14 ASSESSMENT — LID EXAM ASSESSMENTS
OD_BLEPHARITIS: RLL RUL 1+
OS_BLEPHARITIS: LLL LUL 1+

## 2025-01-16 ASSESSMENT — REFRACTION_MANIFEST
OS_VA1: 20/70
OD_SPHERE: -2.25
OD_AXIS: 14
OS_VA1: 20/25
OS_CYLINDER: SPHERE
OS_SPHERE: PLANO
OD_VA1: 20/40
OS_SPHERE: -2.50
OS_AXIS: 90
OS_CYLINDER: +0.50
OD_CYLINDER: +1.00

## 2025-01-16 ASSESSMENT — REFRACTION_CURRENTRX
OS_ADD: +2.50
OD_ADD: +2.50
OD_VPRISM_DIRECTION: BF
OD_OVR_VA: 20/
OS_SPHERE: -1.50
OS_CYLINDER: +0.25
OS_VPRISM_DIRECTION: BF
OD_CYLINDER: +1.75
OD_AXIS: 011
OS_OVR_VA: 20/
OS_AXIS: 136
OD_SPHERE: -1.75

## 2025-01-16 ASSESSMENT — REFRACTION_AUTOREFRACTION
OD_SPHERE: CTL
OS_SPHERE: U/A

## 2025-01-16 ASSESSMENT — VISUAL ACUITY
OS_BCVA: 20/60
OD_BCVA: 20/25

## 2025-01-20 ASSESSMENT — REFRACTION_CURRENTRX
OD_AXIS: 011
OD_SPHERE: -1.75
OD_ADD: +2.50
OS_SPHERE: -1.50
OD_OVR_VA: 20/
OS_ADD: +2.50
OD_VPRISM_DIRECTION: BF
OS_CYLINDER: +0.25
OD_CYLINDER: +1.75
OS_VPRISM_DIRECTION: BF
OS_AXIS: 136
OS_OVR_VA: 20/

## 2025-01-20 ASSESSMENT — REFRACTION_MANIFEST
OS_CYLINDER: +0.50
OS_SPHERE: -2.50
OS_AXIS: 90
OD_AXIS: 14
OS_VA1: 20/70
OD_SPHERE: -2.25
OD_CYLINDER: +1.00
OD_VA1: 20/40

## 2025-01-20 ASSESSMENT — REFRACTION_AUTOREFRACTION
OD_CYLINDER: +1.25
OD_SPHERE: -1.75
OS_AXIS: 89
OS_CYLINDER: +0.75
OD_AXIS: 22
OS_SPHERE: -3.00

## 2025-01-20 ASSESSMENT — VISUAL ACUITY
OD_BCVA: 20/50-2
OS_BCVA: 20/70

## 2025-02-01 ENCOUNTER — OFFICE (OUTPATIENT)
Facility: LOCATION | Age: 65
Setting detail: OPHTHALMOLOGY
End: 2025-02-01
Payer: MEDICARE

## 2025-02-01 DIAGNOSIS — Z96.1: ICD-10-CM

## 2025-02-01 PROCEDURE — 99024 POSTOP FOLLOW-UP VISIT: CPT | Performed by: OPTOMETRIST

## 2025-02-01 ASSESSMENT — CORNEAL EDEMA CLINICAL DESCRIPTION
OS_CORNEALEDEMA: 2+
OD_CORNEALEDEMA: T

## 2025-02-03 ENCOUNTER — OFFICE (OUTPATIENT)
Facility: LOCATION | Age: 65
Setting detail: OPHTHALMOLOGY
End: 2025-02-03
Payer: MEDICARE

## 2025-02-03 DIAGNOSIS — Z96.1: ICD-10-CM

## 2025-02-03 PROCEDURE — 99024 POSTOP FOLLOW-UP VISIT: CPT | Performed by: OPTOMETRIST

## 2025-02-03 ASSESSMENT — LID EXAM ASSESSMENTS
OS_BLEPHARITIS: LLL LUL 1+
OD_BLEPHARITIS: RLL RUL 1+

## 2025-02-03 ASSESSMENT — REFRACTION_CURRENTRX
OD_OVR_VA: 20/
OS_SPHERE: -1.50
OS_OVR_VA: 20/
OD_SPHERE: -1.75
OS_CYLINDER: +0.25
OD_CYLINDER: +1.75
OD_ADD: +2.50
OD_VPRISM_DIRECTION: BF
OS_AXIS: 136
OD_AXIS: 011
OS_ADD: +2.50
OS_VPRISM_DIRECTION: BF

## 2025-02-03 ASSESSMENT — VISUAL ACUITY
OS_BCVA: 20/25
OD_BCVA: 20/25-1

## 2025-02-03 ASSESSMENT — TONOMETRY: OD_IOP_MMHG: 15

## 2025-02-03 ASSESSMENT — CORNEAL EDEMA CLINICAL DESCRIPTION
OD_CORNEALEDEMA: T
OS_CORNEALEDEMA: 2+

## 2025-02-03 ASSESSMENT — REFRACTION_MANIFEST
OD_AXIS: 14
OS_SPHERE: -2.50
OS_CYLINDER: +0.50
OS_CYLINDER: SPHERE
OD_CYLINDER: +1.00
OS_AXIS: 90
OS_VA1: 20/25
OS_VA1: 20/70
OS_SPHERE: PLANO
OD_VA1: 20/40
OD_SPHERE: -2.25

## 2025-02-03 ASSESSMENT — CONFRONTATIONAL VISUAL FIELD TEST (CVF)
OS_FINDINGS: FULL
OD_FINDINGS: FULL

## 2025-02-03 ASSESSMENT — REFRACTION_AUTOREFRACTION
OS_SPHERE: U/A
OD_SPHERE: CTL

## 2025-02-04 ASSESSMENT — REFRACTION_CURRENTRX
OS_ADD: +2.50
OD_AXIS: 011
OS_SPHERE: -1.50
OS_CYLINDER: +0.25
OD_OVR_VA: 20/
OD_VPRISM_DIRECTION: BF
OD_SPHERE: -1.75
OS_AXIS: 136
OS_VPRISM_DIRECTION: BF
OD_ADD: +2.50
OS_OVR_VA: 20/
OD_CYLINDER: +1.75

## 2025-02-04 ASSESSMENT — REFRACTION_AUTOREFRACTION
OD_SPHERE: CTL
OS_SPHERE: U/A

## 2025-02-04 ASSESSMENT — REFRACTION_MANIFEST
OS_CYLINDER: +0.50
OS_VA1: 20/70
OS_VA1: 20/25
OD_CYLINDER: +1.00
OS_SPHERE: PLANO
OS_AXIS: 90
OS_SPHERE: -2.50
OD_AXIS: 14
OS_CYLINDER: SPHERE
OD_VA1: 20/40
OD_SPHERE: -2.25

## 2025-02-04 ASSESSMENT — VISUAL ACUITY
OS_BCVA: 20/25
OD_BCVA: 20/25

## 2025-02-24 ENCOUNTER — OFFICE (OUTPATIENT)
Facility: LOCATION | Age: 65
Setting detail: OPHTHALMOLOGY
End: 2025-02-24
Payer: MEDICARE

## 2025-02-24 DIAGNOSIS — Z96.1: ICD-10-CM

## 2025-02-24 PROCEDURE — 99024 POSTOP FOLLOW-UP VISIT: CPT | Performed by: OPTOMETRIST

## 2025-02-24 ASSESSMENT — TONOMETRY
OD_IOP_MMHG: 16
OS_IOP_MMHG: 14

## 2025-02-24 ASSESSMENT — CONFRONTATIONAL VISUAL FIELD TEST (CVF)
OD_FINDINGS: FULL
OS_FINDINGS: FULL

## 2025-02-24 ASSESSMENT — LID EXAM ASSESSMENTS
OD_BLEPHARITIS: RLL RUL 1+
OS_BLEPHARITIS: LLL LUL 1+

## 2025-02-24 ASSESSMENT — CORNEAL EDEMA CLINICAL DESCRIPTION
OS_CORNEALEDEMA: 2+
OD_CORNEALEDEMA: T

## 2025-02-25 ASSESSMENT — REFRACTION_CURRENTRX
OS_SPHERE: -1.50
OD_OVR_VA: 20/
OD_ADD: +2.50
OS_AXIS: 136
OS_ADD: +2.50
OS_OVR_VA: 20/
OD_SPHERE: -1.75
OS_VPRISM_DIRECTION: BF
OD_VPRISM_DIRECTION: BF
OD_AXIS: 011
OS_CYLINDER: +0.25
OD_CYLINDER: +1.75

## 2025-02-25 ASSESSMENT — REFRACTION_AUTOREFRACTION
OD_AXIS: 011
OS_AXIS: 146
OS_SPHERE: -0.50
OD_SPHERE: -0.50
OD_CYLINDER: +1.25
OS_CYLINDER: +1.25

## 2025-02-25 ASSESSMENT — REFRACTION_MANIFEST
OS_CYLINDER: +0.75
OS_VA1: 20/70
OS_VA1: 20/20
OD_SPHERE: -2.25
OS_AXIS: 150
OS_VA1: 20/25
OS_SPHERE: -2.50
OS_SPHERE: -0.50
OS_SPHERE: PLANO
OS_ADD: +2.50
OS_CYLINDER: SPHERE
OD_VA1: 20/25
OS_AXIS: 90
OS_CYLINDER: +0.50
OD_CYLINDER: +1.00
OD_AXIS: 14
OD_SPHERE: -0.50
OD_ADD: +2.50
OD_AXIS: 20
OD_VA1: 20/40
OU_VA: 20/20
OD_CYLINDER: +0.75

## 2025-02-25 ASSESSMENT — VISUAL ACUITY
OS_BCVA: 20/25-2
OD_BCVA: 20/30

## 2025-07-08 ENCOUNTER — OFFICE (OUTPATIENT)
Facility: LOCATION | Age: 65
Setting detail: OPHTHALMOLOGY
End: 2025-07-08
Payer: MEDICARE

## 2025-07-08 DIAGNOSIS — H31.29: ICD-10-CM

## 2025-07-08 DIAGNOSIS — H43.813: ICD-10-CM

## 2025-07-08 DIAGNOSIS — H26.493: ICD-10-CM

## 2025-07-08 DIAGNOSIS — H33.312: ICD-10-CM

## 2025-07-08 PROBLEM — H16.223 DRY EYE SYNDROME K SICCA; BOTH EYES: Status: ACTIVE | Noted: 2025-07-08

## 2025-07-08 PROBLEM — H01.004 BLEPHARITIS; RIGHT UPPER LID, RIGHT LOWER LID, LEFT UPPER LID, LEFT LOWER LID: Status: ACTIVE | Noted: 2025-07-08

## 2025-07-08 PROBLEM — H01.001 BLEPHARITIS; RIGHT UPPER LID, RIGHT LOWER LID, LEFT UPPER LID, LEFT LOWER LID: Status: ACTIVE | Noted: 2025-07-08

## 2025-07-08 PROBLEM — H01.002 BLEPHARITIS; RIGHT UPPER LID, RIGHT LOWER LID, LEFT UPPER LID, LEFT LOWER LID: Status: ACTIVE | Noted: 2025-07-08

## 2025-07-08 PROBLEM — H01.005 BLEPHARITIS; RIGHT UPPER LID, RIGHT LOWER LID, LEFT UPPER LID, LEFT LOWER LID: Status: ACTIVE | Noted: 2025-07-08

## 2025-07-08 PROCEDURE — 92250 FUNDUS PHOTOGRAPHY W/I&R: CPT | Performed by: OPTOMETRIST

## 2025-07-08 PROCEDURE — 92014 COMPRE OPH EXAM EST PT 1/>: CPT | Performed by: OPTOMETRIST

## 2025-07-08 ASSESSMENT — SUPERFICIAL PUNCTATE KERATITIS (SPK)
OS_SPK: T
OD_SPK: T

## 2025-07-08 ASSESSMENT — REFRACTION_MANIFEST
OD_SPHERE: -0.50
OS_VA1: 20/20
OD_AXIS: 20
OD_SPHERE: -2.25
OS_CYLINDER: SPHERE
OS_VA1: 20/25
OS_AXIS: 150
OD_AXIS: 14
OD_CYLINDER: +1.00
OS_ADD: +2.50
OD_VA1: 20/40
OD_CYLINDER: +0.75
OU_VA: 20/20
OS_SPHERE: -0.50
OD_ADD: +2.50
OS_AXIS: 90
OS_SPHERE: PLANO
OS_CYLINDER: +0.75
OD_VA1: 20/25
OS_CYLINDER: +0.50
OS_SPHERE: -2.50
OS_VA1: 20/70

## 2025-07-08 ASSESSMENT — REFRACTION_AUTOREFRACTION
OS_AXIS: 145
OD_CYLINDER: +1.25
OS_SPHERE: -0.50
OD_AXIS: 008
OS_CYLINDER: +1.25
OD_SPHERE: -0.25

## 2025-07-08 ASSESSMENT — VISUAL ACUITY
OS_BCVA: 20/25-2
OD_BCVA: 20/20

## 2025-07-08 ASSESSMENT — LID EXAM ASSESSMENTS
OS_BLEPHARITIS: LLL LUL 1+
OD_BLEPHARITIS: RLL RUL 1+

## 2025-07-08 ASSESSMENT — CONFRONTATIONAL VISUAL FIELD TEST (CVF)
OS_FINDINGS: FULL
OD_FINDINGS: FULL

## 2025-07-08 ASSESSMENT — REFRACTION_CURRENTRX
OD_OVR_VA: 20/
OS_OVR_VA: 20/

## 2025-07-08 ASSESSMENT — TONOMETRY
OD_IOP_MMHG: 11
OS_IOP_MMHG: 12